# Patient Record
Sex: FEMALE | Race: BLACK OR AFRICAN AMERICAN | NOT HISPANIC OR LATINO | Employment: OTHER | URBAN - METROPOLITAN AREA
[De-identification: names, ages, dates, MRNs, and addresses within clinical notes are randomized per-mention and may not be internally consistent; named-entity substitution may affect disease eponyms.]

---

## 2017-07-07 ENCOUNTER — HOSPITAL ENCOUNTER (EMERGENCY)
Facility: HOSPITAL | Age: 82
Discharge: HOME | End: 2017-07-07
Attending: SURGERY
Payer: MEDICARE

## 2017-07-07 VITALS
RESPIRATION RATE: 16 BRPM | TEMPERATURE: 98 F | HEART RATE: 60 BPM | DIASTOLIC BLOOD PRESSURE: 75 MMHG | SYSTOLIC BLOOD PRESSURE: 152 MMHG | OXYGEN SATURATION: 96 % | BODY MASS INDEX: 40.36 KG/M2 | WEIGHT: 180 LBS
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
## 2017-07-07 DIAGNOSIS — M25.552 LEFT HIP PAIN: Primary | ICD-10-CM

## 2017-07-07 PROCEDURE — 99283 EMERGENCY DEPT VISIT LOW MDM: CPT

## 2017-07-07 PROCEDURE — 25000003 PHARM REV CODE 250: Performed by: SURGERY

## 2017-07-07 RX ORDER — IBUPROFEN 800 MG/1
800 TABLET ORAL EVERY 6 HOURS PRN
Qty: 20 TABLET | Refills: 0 | Status: SHIPPED | OUTPATIENT
Start: 2017-07-07 | End: 2018-06-15

## 2017-07-07 RX ORDER — ACETAMINOPHEN 500 MG
1000 TABLET ORAL
Status: COMPLETED | OUTPATIENT
Start: 2017-07-07 | End: 2017-07-07

## 2017-07-07 RX ADMIN — ACETAMINOPHEN 1000 MG: 500 TABLET ORAL at 06:07

## 2017-07-08 NOTE — ED PROVIDER NOTES
[facility]                                        July 7, 2017                   Chief Complaint  89 y.o. female with Hip Pain (lt hip x 1 day)    History of Present Illness  Roseanne Marroquin presents to the emergency room with left hip pain this week  Patient's son states that the patient fell on July 4 with left hip pain afterwards  Patient on exam has a normal left hip no bruising swelling or deformity now  Patient has no leg shortening, is able to weight-bear without difficulty the ER  Patient denies any head trauma loss of consciousness, just left hip pain today    The history is provided by the patient  Medical history: Diabetes and hypertension  Surgical history: Cholecystectomy and hernia repair  No Known Allergies   No family history on file.    Review of Systems and Physical Exam     Review of Systems  -- Constitution - no fever, denies fatigue, no weakness, no chills  -- Eyes - no tearing or redness, no visual disturbance  -- Ear, Nose - no tinnitus or earache, no nasal congestion or discharge  -- Mouth,Throat - no sore throat, no toothache, normal voice, normal swallowing  -- Respiratory - denies cough and congestion, no shortness of breath, no GAVIN  -- Cardiovascular - denies chest pain, no palpitations, denies claudication  -- Gastrointestinal - denies abdominal pain, nausea, vomiting, or diarrhea  -- Musculoskeletal - left hip pain  -- Neurological - no headache, denies weakness or seizure; no LOC  -- Skin - denies pallor, rash, or changes in skin. no hives or welts noted    Vital Signs  -- Her blood pressure is 152/75 and her pulse is 60.  -- Her respiration is 16 and oxygen saturation is 96%.      Physical Exam  -- Nursing note and vitals reviewed  -- Constitutional: Appears well-developed and well-nourished  -- Head: Atraumatic. Normocephalic. No obvious abnormality  -- Eyes: Pupils are equal and reactive to light. Normal conjunctiva and lids  -- Cardiac: Normal rate, regular  rhythm and normal heart sounds  -- Pulmonary: Normal respiratory effort, breath sounds clear to auscultation  -- Abdominal: Soft, no tenderness. Normal bowel sounds. Normal liver edge  -- Musculoskeletal: Normal range of motion, no effusions. Joints stable   -- Neurological: No focal deficits. Showed good interaction with staff  -- Vascular: Posterior tibial, dorsalis pedis and radial pulses 2+ bilaterally      Emergency Room Course     Treatment and Evaluation  -- Chest x-ray showed no infiltrate and showed no acute pathology   -- Left hip x-ray shows a negative for fracture  -- PO 1 g Tylenol given in today in the ER    Diagnosis  -- The primary encounter diagnosis was Left hip pain.   -- A diagnosis of Fall was also pertinent to this visit.    Disposition and Plan  -- Disposition: home  -- Condition: stable  -- Follow-up: Patient to follow up with JHONATHAN Andrea MD in 1-2 days.  -- I advised the patient that we have found no life threatening condition today  -- At this time, I believe the patient is clinically stable for discharge.   -- The patient acknowledges that close follow up with a MD is required   -- Patient agrees to comply with all instruction and direction given in the ER    This note is dictated on Dragon Natural Speaking word recognition program.  There are word recognition mistakes that are occasionally missed on review.           Fernando Baker MD  07/07/17 5083

## 2018-01-21 ENCOUNTER — HOSPITAL ENCOUNTER (EMERGENCY)
Facility: HOSPITAL | Age: 83
Discharge: HOME | End: 2018-01-21
Attending: EMERGENCY MEDICINE
Payer: MEDICARE

## 2018-01-21 VITALS
SYSTOLIC BLOOD PRESSURE: 172 MMHG | BODY MASS INDEX: 38.56 KG/M2 | HEART RATE: 57 BPM | TEMPERATURE: 97 F | RESPIRATION RATE: 17 BRPM | WEIGHT: 172 LBS | DIASTOLIC BLOOD PRESSURE: 74 MMHG

## 2018-01-21 DIAGNOSIS — S39.012A ACUTE MYOFASCIAL STRAIN OF LUMBAR REGION, INITIAL ENCOUNTER: Primary | ICD-10-CM

## 2018-01-21 LAB
AMORPH CRY URNS QL MICRO: ABNORMAL
BACTERIA #/AREA URNS HPF: ABNORMAL /HPF
BILIRUB UR QL STRIP: NEGATIVE
CLARITY UR: CLEAR
COLOR UR: YELLOW
GLUCOSE UR QL STRIP: NEGATIVE
HGB UR QL STRIP: ABNORMAL
KETONES UR QL STRIP: NEGATIVE
LEUKOCYTE ESTERASE UR QL STRIP: ABNORMAL
MICROSCOPIC COMMENT: ABNORMAL
NITRITE UR QL STRIP: NEGATIVE
PH UR STRIP: 5 [PH] (ref 5–8)
PROT UR QL STRIP: NEGATIVE
RBC #/AREA URNS HPF: 7 /HPF (ref 0–4)
SP GR UR STRIP: 1.02 (ref 1–1.03)
SQUAMOUS #/AREA URNS HPF: 6 /HPF
URN SPEC COLLECT METH UR: ABNORMAL
UROBILINOGEN UR STRIP-ACNC: NEGATIVE EU/DL
WBC #/AREA URNS HPF: 6 /HPF (ref 0–5)

## 2018-01-21 PROCEDURE — 99283 EMERGENCY DEPT VISIT LOW MDM: CPT | Mod: 25

## 2018-01-21 PROCEDURE — 25000003 PHARM REV CODE 250: Performed by: EMERGENCY MEDICINE

## 2018-01-21 PROCEDURE — 96372 THER/PROPH/DIAG INJ SC/IM: CPT

## 2018-01-21 PROCEDURE — 81000 URINALYSIS NONAUTO W/SCOPE: CPT

## 2018-01-21 PROCEDURE — 63600175 PHARM REV CODE 636 W HCPCS: Performed by: EMERGENCY MEDICINE

## 2018-01-21 RX ORDER — KETOROLAC TROMETHAMINE 10 MG/1
10 TABLET, FILM COATED ORAL EVERY 6 HOURS
Qty: 15 TABLET | Refills: 0 | Status: ON HOLD | OUTPATIENT
Start: 2018-01-21 | End: 2018-06-16

## 2018-01-21 RX ORDER — KETOROLAC TROMETHAMINE 30 MG/ML
30 INJECTION, SOLUTION INTRAMUSCULAR; INTRAVENOUS
Status: COMPLETED | OUTPATIENT
Start: 2018-01-21 | End: 2018-01-21

## 2018-01-21 RX ORDER — CYCLOBENZAPRINE HCL 10 MG
10 TABLET ORAL 3 TIMES DAILY PRN
Qty: 15 TABLET | Refills: 0 | Status: SHIPPED | OUTPATIENT
Start: 2018-01-21 | End: 2018-01-26

## 2018-01-21 RX ORDER — CYCLOBENZAPRINE HCL 10 MG
10 TABLET ORAL
Status: COMPLETED | OUTPATIENT
Start: 2018-01-21 | End: 2018-01-21

## 2018-01-21 RX ADMIN — KETOROLAC TROMETHAMINE 30 MG: 30 INJECTION, SOLUTION INTRAMUSCULAR at 02:01

## 2018-01-21 RX ADMIN — CYCLOBENZAPRINE HYDROCHLORIDE 10 MG: 10 TABLET, FILM COATED ORAL at 02:01

## 2018-01-21 NOTE — ED PROVIDER NOTES
Encounter Date: 1/21/2018       History     Chief Complaint   Patient presents with    Back Pain     This 89-year-old female complains of a pain in her lumbar area lateral to the left above her pelvis.  It is nonradiating.  it hurts to move.  Been there for several days.  She recalls no trauma.  She's had a compression fracture of T12 in the not too distant past.  She denies any urinary complaints.  Her bowels are normal.  She has some epigastric discomfort sometimes when eating          Review of patient's allergies indicates:  No Known Allergies  Past Medical History:   Diagnosis Date    Diabetes mellitus     Hypertension      Past Surgical History:   Procedure Laterality Date    CHOLECYSTECTOMY      HERNIA REPAIR       No family history on file.  Social History   Substance Use Topics    Smoking status: Never Smoker    Smokeless tobacco: Not on file    Alcohol use No     Review of Systems   Musculoskeletal: Positive for back pain.   All other systems reviewed and are negative.      Physical Exam     Initial Vitals [01/21/18 1157]   BP Pulse Resp Temp SpO2   (!) 172/74 (!) 57 17 97.4 °F (36.3 °C) --      MAP       106.67         Physical Exam    Nursing note and vitals reviewed.  Constitutional: She appears well-developed and well-nourished.   HENT:   Mouth/Throat: Oropharynx is clear and moist.   Eyes: Conjunctivae are normal. Pupils are equal, round, and reactive to light.   Neck: Normal range of motion.   Cardiovascular: Normal heart sounds and intact distal pulses.   Pulmonary/Chest: Breath sounds normal. No respiratory distress.   Abdominal: Soft. Bowel sounds are normal.   Musculoskeletal: Normal range of motion.   Examination of back shows some muscle spasm in the lateral lumbar area with tenderness.   Skin: Skin is warm and dry.   Psychiatric: She has a normal mood and affect.         ED Course   Procedures  Labs Reviewed   URINALYSIS                               ED Course      Clinical Impression:    The encounter diagnosis was Acute myofascial strain of lumbar region, initial encounter.                           Sergey Collins MD  01/21/18 9878

## 2018-06-15 ENCOUNTER — HOSPITAL ENCOUNTER (OUTPATIENT)
Facility: HOSPITAL | Age: 83
End: 2018-06-16
Attending: FAMILY MEDICINE | Admitting: FAMILY MEDICINE
Payer: MEDICARE

## 2018-06-15 DIAGNOSIS — N30.01 ACUTE CYSTITIS WITH HEMATURIA: ICD-10-CM

## 2018-06-15 DIAGNOSIS — R55 SYNCOPE: ICD-10-CM

## 2018-06-15 DIAGNOSIS — T67.1XXA HEAT SYNCOPE, INITIAL ENCOUNTER: Primary | ICD-10-CM

## 2018-06-15 LAB
ALBUMIN SERPL BCP-MCNC: 3.9 G/DL
ALP SERPL-CCNC: 67 U/L
ALT SERPL W/O P-5'-P-CCNC: 13 U/L
ANION GAP SERPL CALC-SCNC: 10 MMOL/L
AST SERPL-CCNC: 18 U/L
BACTERIA #/AREA URNS HPF: ABNORMAL /HPF
BASOPHILS # BLD AUTO: 0.03 K/UL
BASOPHILS NFR BLD: 0.5 %
BILIRUB SERPL-MCNC: 0.8 MG/DL
BILIRUB UR QL STRIP: NEGATIVE
BUN SERPL-MCNC: 24 MG/DL
CALCIUM SERPL-MCNC: 9.5 MG/DL
CHLORIDE SERPL-SCNC: 104 MMOL/L
CLARITY UR: CLEAR
CO2 SERPL-SCNC: 27 MMOL/L
COLOR UR: YELLOW
CREAT SERPL-MCNC: 1.4 MG/DL
DIFFERENTIAL METHOD: ABNORMAL
EOSINOPHIL # BLD AUTO: 1.1 K/UL
EOSINOPHIL NFR BLD: 16.9 %
ERYTHROCYTE [DISTWIDTH] IN BLOOD BY AUTOMATED COUNT: 16.2 %
EST. GFR  (AFRICAN AMERICAN): 38 ML/MIN/1.73 M^2
EST. GFR  (NON AFRICAN AMERICAN): 33 ML/MIN/1.73 M^2
GLUCOSE SERPL-MCNC: 99 MG/DL
GLUCOSE UR QL STRIP: NEGATIVE
HCT VFR BLD AUTO: 40.7 %
HGB BLD-MCNC: 13.2 G/DL
HGB UR QL STRIP: ABNORMAL
HYALINE CASTS #/AREA URNS LPF: 3 /LPF
KETONES UR QL STRIP: NEGATIVE
LEUKOCYTE ESTERASE UR QL STRIP: ABNORMAL
LYMPHOCYTES # BLD AUTO: 3.1 K/UL
LYMPHOCYTES NFR BLD: 46.5 %
MCH RBC QN AUTO: 27.8 PG
MCHC RBC AUTO-ENTMCNC: 32.4 G/DL
MCV RBC AUTO: 86 FL
MICROSCOPIC COMMENT: ABNORMAL
MONOCYTES # BLD AUTO: 0.5 K/UL
MONOCYTES NFR BLD: 7.3 %
NEUTROPHILS # BLD AUTO: 1.9 K/UL
NEUTROPHILS NFR BLD: 28.8 %
NITRITE UR QL STRIP: NEGATIVE
PH UR STRIP: 6 [PH] (ref 5–8)
PLATELET # BLD AUTO: 197 K/UL
PMV BLD AUTO: 10.5 FL
POTASSIUM SERPL-SCNC: 4.5 MMOL/L
PROT SERPL-MCNC: 7.8 G/DL
PROT UR QL STRIP: NEGATIVE
RBC # BLD AUTO: 4.75 M/UL
RBC #/AREA URNS HPF: 12 /HPF (ref 0–4)
SODIUM SERPL-SCNC: 141 MMOL/L
SP GR UR STRIP: <=1.005 (ref 1–1.03)
SQUAMOUS #/AREA URNS HPF: 1 /HPF
TROPONIN I SERPL DL<=0.01 NG/ML-MCNC: 0.01 NG/ML
TSH SERPL DL<=0.005 MIU/L-ACNC: 0.8 UIU/ML
URN SPEC COLLECT METH UR: ABNORMAL
UROBILINOGEN UR STRIP-ACNC: NEGATIVE EU/DL
WBC # BLD AUTO: 6.56 K/UL
WBC #/AREA URNS HPF: 11 /HPF (ref 0–5)
WBC CLUMPS URNS QL MICRO: ABNORMAL

## 2018-06-15 PROCEDURE — 63600175 PHARM REV CODE 636 W HCPCS: Performed by: INTERNAL MEDICINE

## 2018-06-15 PROCEDURE — 96375 TX/PRO/DX INJ NEW DRUG ADDON: CPT

## 2018-06-15 PROCEDURE — 25000003 PHARM REV CODE 250: Performed by: INTERNAL MEDICINE

## 2018-06-15 PROCEDURE — 84484 ASSAY OF TROPONIN QUANT: CPT

## 2018-06-15 PROCEDURE — 36415 COLL VENOUS BLD VENIPUNCTURE: CPT

## 2018-06-15 PROCEDURE — 99285 EMERGENCY DEPT VISIT HI MDM: CPT | Mod: 25

## 2018-06-15 PROCEDURE — G0378 HOSPITAL OBSERVATION PER HR: HCPCS

## 2018-06-15 PROCEDURE — 96361 HYDRATE IV INFUSION ADD-ON: CPT

## 2018-06-15 PROCEDURE — 94760 N-INVAS EAR/PLS OXIMETRY 1: CPT

## 2018-06-15 PROCEDURE — 84443 ASSAY THYROID STIM HORMONE: CPT

## 2018-06-15 PROCEDURE — 85025 COMPLETE CBC W/AUTO DIFF WBC: CPT

## 2018-06-15 PROCEDURE — 96374 THER/PROPH/DIAG INJ IV PUSH: CPT

## 2018-06-15 PROCEDURE — 93005 ELECTROCARDIOGRAM TRACING: CPT

## 2018-06-15 PROCEDURE — 87040 BLOOD CULTURE FOR BACTERIA: CPT | Mod: 59

## 2018-06-15 PROCEDURE — 81000 URINALYSIS NONAUTO W/SCOPE: CPT

## 2018-06-15 PROCEDURE — 80053 COMPREHEN METABOLIC PANEL: CPT

## 2018-06-15 RX ORDER — ASPIRIN 325 MG
325 TABLET ORAL
Status: COMPLETED | OUTPATIENT
Start: 2018-06-15 | End: 2018-06-15

## 2018-06-15 RX ORDER — LISINOPRIL 40 MG/1
40 TABLET ORAL DAILY
Status: DISCONTINUED | OUTPATIENT
Start: 2018-06-16 | End: 2018-06-16 | Stop reason: HOSPADM

## 2018-06-15 RX ORDER — CEFTRIAXONE 1 G/1
1 INJECTION, POWDER, FOR SOLUTION INTRAMUSCULAR; INTRAVENOUS
Status: COMPLETED | OUTPATIENT
Start: 2018-06-15 | End: 2018-06-15

## 2018-06-15 RX ORDER — HYDROCHLOROTHIAZIDE 25 MG/1
25 TABLET ORAL DAILY
Status: DISCONTINUED | OUTPATIENT
Start: 2018-06-16 | End: 2018-06-16 | Stop reason: HOSPADM

## 2018-06-15 RX ORDER — AMLODIPINE BESYLATE 5 MG/1
10 TABLET ORAL DAILY
Status: DISCONTINUED | OUTPATIENT
Start: 2018-06-16 | End: 2018-06-16 | Stop reason: HOSPADM

## 2018-06-15 RX ORDER — MECLIZINE HCL 12.5 MG 12.5 MG/1
12.5 TABLET ORAL 3 TIMES DAILY PRN
Status: DISCONTINUED | OUTPATIENT
Start: 2018-06-15 | End: 2018-06-16 | Stop reason: HOSPADM

## 2018-06-15 RX ORDER — KETOROLAC TROMETHAMINE 10 MG/1
10 TABLET, FILM COATED ORAL EVERY 6 HOURS
Status: DISCONTINUED | OUTPATIENT
Start: 2018-06-16 | End: 2018-06-16

## 2018-06-15 RX ORDER — CIPROFLOXACIN 2 MG/ML
400 INJECTION, SOLUTION INTRAVENOUS
Status: DISCONTINUED | OUTPATIENT
Start: 2018-06-15 | End: 2018-06-16

## 2018-06-15 RX ORDER — SODIUM CHLORIDE 9 MG/ML
500 INJECTION, SOLUTION INTRAVENOUS ONCE
Status: COMPLETED | OUTPATIENT
Start: 2018-06-15 | End: 2018-06-15

## 2018-06-15 RX ORDER — HYDROXYZINE HYDROCHLORIDE 25 MG/1
25 TABLET, FILM COATED ORAL 3 TIMES DAILY PRN
Status: DISCONTINUED | OUTPATIENT
Start: 2018-06-15 | End: 2018-06-16 | Stop reason: HOSPADM

## 2018-06-15 RX ADMIN — APIXABAN 2.5 MG: 2.5 TABLET, FILM COATED ORAL at 11:06

## 2018-06-15 RX ADMIN — KETOROLAC TROMETHAMINE 10 MG: 10 TABLET, FILM COATED ORAL at 11:06

## 2018-06-15 RX ADMIN — CIPROFLOXACIN 400 MG: 2 INJECTION, SOLUTION INTRAVENOUS at 11:06

## 2018-06-15 RX ADMIN — ASPIRIN 325 MG: 325 TABLET, COATED ORAL at 10:06

## 2018-06-15 RX ADMIN — CEFTRIAXONE SODIUM 1 G: 1 INJECTION, POWDER, FOR SOLUTION INTRAMUSCULAR; INTRAVENOUS at 09:06

## 2018-06-15 RX ADMIN — SODIUM CHLORIDE 500 ML: 0.9 INJECTION, SOLUTION INTRAVENOUS at 08:06

## 2018-06-16 ENCOUNTER — HOSPITAL ENCOUNTER (INPATIENT)
Facility: HOSPITAL | Age: 83
LOS: 3 days | Discharge: HOME | DRG: 309 | End: 2018-06-19
Attending: HOSPITALIST | Admitting: INTERNAL MEDICINE
Payer: MEDICARE

## 2018-06-16 VITALS
OXYGEN SATURATION: 98 % | HEART RATE: 54 BPM | BODY MASS INDEX: 38.78 KG/M2 | RESPIRATION RATE: 14 BRPM | DIASTOLIC BLOOD PRESSURE: 65 MMHG | WEIGHT: 172.38 LBS | SYSTOLIC BLOOD PRESSURE: 114 MMHG | HEIGHT: 56 IN | TEMPERATURE: 98 F

## 2018-06-16 DIAGNOSIS — I48.91 ATRIAL FIBRILLATION: ICD-10-CM

## 2018-06-16 DIAGNOSIS — I49.9 ABNORMAL HEART RHYTHM: ICD-10-CM

## 2018-06-16 DIAGNOSIS — R55 VASOVAGAL SYNCOPE: Primary | ICD-10-CM

## 2018-06-16 DIAGNOSIS — I49.5 SSS (SICK SINUS SYNDROME): ICD-10-CM

## 2018-06-16 DIAGNOSIS — R00.1 SYMPTOMATIC BRADYCARDIA: ICD-10-CM

## 2018-06-16 LAB
ANION GAP SERPL CALC-SCNC: 10 MMOL/L
APTT BLDCRRT: 24.6 SEC
BUN SERPL-MCNC: 20 MG/DL
CALCIUM SERPL-MCNC: 9.4 MG/DL
CHLORIDE SERPL-SCNC: 103 MMOL/L
CO2 SERPL-SCNC: 27 MMOL/L
CREAT SERPL-MCNC: 1.4 MG/DL
EST. GFR  (AFRICAN AMERICAN): 37.6 ML/MIN/1.73 M^2
EST. GFR  (NON AFRICAN AMERICAN): 32.6 ML/MIN/1.73 M^2
GLUCOSE SERPL-MCNC: 119 MG/DL
INR PPP: 1
MAGNESIUM SERPL-MCNC: 2.4 MG/DL
POTASSIUM SERPL-SCNC: 4.1 MMOL/L
PROTHROMBIN TIME: 10.8 SEC
SODIUM SERPL-SCNC: 140 MMOL/L

## 2018-06-16 PROCEDURE — 63600175 PHARM REV CODE 636 W HCPCS

## 2018-06-16 PROCEDURE — 25000003 PHARM REV CODE 250: Performed by: INTERNAL MEDICINE

## 2018-06-16 PROCEDURE — G0378 HOSPITAL OBSERVATION PER HR: HCPCS

## 2018-06-16 PROCEDURE — 94761 N-INVAS EAR/PLS OXIMETRY MLT: CPT

## 2018-06-16 PROCEDURE — 85610 PROTHROMBIN TIME: CPT

## 2018-06-16 PROCEDURE — 83735 ASSAY OF MAGNESIUM: CPT

## 2018-06-16 PROCEDURE — 99223 1ST HOSP IP/OBS HIGH 75: CPT | Mod: AI,,, | Performed by: INTERNAL MEDICINE

## 2018-06-16 PROCEDURE — 20600001 HC STEP DOWN PRIVATE ROOM

## 2018-06-16 PROCEDURE — 99220 PR INITIAL OBSERVATION CARE,LEVL III: CPT | Mod: ,,, | Performed by: FAMILY MEDICINE

## 2018-06-16 PROCEDURE — 36415 COLL VENOUS BLD VENIPUNCTURE: CPT

## 2018-06-16 PROCEDURE — 93010 ELECTROCARDIOGRAM REPORT: CPT | Mod: ,,, | Performed by: INTERNAL MEDICINE

## 2018-06-16 PROCEDURE — 25000003 PHARM REV CODE 250: Performed by: FAMILY MEDICINE

## 2018-06-16 PROCEDURE — 85730 THROMBOPLASTIN TIME PARTIAL: CPT

## 2018-06-16 PROCEDURE — 93005 ELECTROCARDIOGRAM TRACING: CPT

## 2018-06-16 PROCEDURE — 80048 BASIC METABOLIC PNL TOTAL CA: CPT

## 2018-06-16 PROCEDURE — 87086 URINE CULTURE/COLONY COUNT: CPT

## 2018-06-16 PROCEDURE — 96375 TX/PRO/DX INJ NEW DRUG ADDON: CPT

## 2018-06-16 RX ORDER — HYDRALAZINE HYDROCHLORIDE 25 MG/1
25 TABLET, FILM COATED ORAL EVERY 4 HOURS PRN
Status: DISCONTINUED | OUTPATIENT
Start: 2018-06-16 | End: 2018-06-19 | Stop reason: HOSPADM

## 2018-06-16 RX ORDER — IBUPROFEN 200 MG
16 TABLET ORAL
Status: DISCONTINUED | OUTPATIENT
Start: 2018-06-16 | End: 2018-06-19 | Stop reason: HOSPADM

## 2018-06-16 RX ORDER — ONDANSETRON 8 MG/1
8 TABLET, ORALLY DISINTEGRATING ORAL EVERY 8 HOURS PRN
Status: DISCONTINUED | OUTPATIENT
Start: 2018-06-16 | End: 2018-06-16

## 2018-06-16 RX ORDER — ATROPINE SULFATE 1 MG/ML
0.5 INJECTION, SOLUTION INTRAMUSCULAR; INTRAVENOUS; SUBCUTANEOUS ONCE
Status: DISCONTINUED | OUTPATIENT
Start: 2018-06-16 | End: 2018-06-16 | Stop reason: HOSPADM

## 2018-06-16 RX ORDER — HYDRALAZINE HYDROCHLORIDE 20 MG/ML
5 INJECTION INTRAMUSCULAR; INTRAVENOUS EVERY 6 HOURS PRN
Status: DISCONTINUED | OUTPATIENT
Start: 2018-06-16 | End: 2018-06-19 | Stop reason: HOSPADM

## 2018-06-16 RX ORDER — ONDANSETRON 2 MG/ML
INJECTION INTRAMUSCULAR; INTRAVENOUS
Status: COMPLETED
Start: 2018-06-16 | End: 2018-06-16

## 2018-06-16 RX ORDER — HYDROXYZINE HYDROCHLORIDE 25 MG/1
25 TABLET, FILM COATED ORAL 3 TIMES DAILY PRN
Status: DISCONTINUED | OUTPATIENT
Start: 2018-06-16 | End: 2018-06-16

## 2018-06-16 RX ORDER — ONDANSETRON 2 MG/ML
4 INJECTION INTRAMUSCULAR; INTRAVENOUS EVERY 6 HOURS PRN
Status: DISCONTINUED | OUTPATIENT
Start: 2018-06-16 | End: 2018-06-16 | Stop reason: HOSPADM

## 2018-06-16 RX ORDER — AMLODIPINE BESYLATE 10 MG/1
10 TABLET ORAL DAILY
Status: DISCONTINUED | OUTPATIENT
Start: 2018-06-17 | End: 2018-06-19 | Stop reason: HOSPADM

## 2018-06-16 RX ORDER — IBUPROFEN 200 MG
24 TABLET ORAL
Status: DISCONTINUED | OUTPATIENT
Start: 2018-06-16 | End: 2018-06-19 | Stop reason: HOSPADM

## 2018-06-16 RX ORDER — GLUCAGON 1 MG
1 KIT INJECTION
Status: DISCONTINUED | OUTPATIENT
Start: 2018-06-16 | End: 2018-06-19 | Stop reason: HOSPADM

## 2018-06-16 RX ORDER — SODIUM CHLORIDE 0.9 % (FLUSH) 0.9 %
5 SYRINGE (ML) INJECTION
Status: DISCONTINUED | OUTPATIENT
Start: 2018-06-16 | End: 2018-06-19 | Stop reason: HOSPADM

## 2018-06-16 RX ORDER — PANTOPRAZOLE SODIUM 40 MG/1
40 TABLET, DELAYED RELEASE ORAL DAILY
Status: DISCONTINUED | OUTPATIENT
Start: 2018-06-17 | End: 2018-06-19 | Stop reason: HOSPADM

## 2018-06-16 RX ORDER — PANTOPRAZOLE SODIUM 40 MG/1
40 TABLET, DELAYED RELEASE ORAL DAILY
Status: DISCONTINUED | OUTPATIENT
Start: 2018-06-16 | End: 2018-06-16 | Stop reason: HOSPADM

## 2018-06-16 RX ORDER — HEPARIN SODIUM 5000 [USP'U]/ML
5000 INJECTION, SOLUTION INTRAVENOUS; SUBCUTANEOUS EVERY 8 HOURS
Status: DISCONTINUED | OUTPATIENT
Start: 2018-06-16 | End: 2018-06-16

## 2018-06-16 RX ORDER — HYDRALAZINE HYDROCHLORIDE 25 MG/1
25 TABLET, FILM COATED ORAL EVERY 8 HOURS
Status: DISCONTINUED | OUTPATIENT
Start: 2018-06-16 | End: 2018-06-19 | Stop reason: HOSPADM

## 2018-06-16 RX ADMIN — APIXABAN 2.5 MG: 2.5 TABLET, FILM COATED ORAL at 09:06

## 2018-06-16 RX ADMIN — ONDANSETRON 4 MG: 2 INJECTION INTRAMUSCULAR; INTRAVENOUS at 09:06

## 2018-06-16 RX ADMIN — PANTOPRAZOLE SODIUM 40 MG: 40 TABLET, DELAYED RELEASE ORAL at 03:06

## 2018-06-16 RX ADMIN — LIDOCAINE HYDROCHLORIDE 50 ML: 20 SOLUTION ORAL; TOPICAL at 03:06

## 2018-06-16 RX ADMIN — HYDROCHLOROTHIAZIDE 25 MG: 25 TABLET ORAL at 09:06

## 2018-06-16 RX ADMIN — AMLODIPINE BESYLATE 10 MG: 5 TABLET ORAL at 09:06

## 2018-06-16 RX ADMIN — APIXABAN 2.5 MG: 2.5 TABLET, FILM COATED ORAL at 10:06

## 2018-06-16 RX ADMIN — ALUMINUM HYDROXIDE, MAGNESIUM HYDROXIDE, AND SIMETHICONE 50 ML: 200; 200; 20 SUSPENSION ORAL at 10:06

## 2018-06-16 RX ADMIN — LISINOPRIL 40 MG: 40 TABLET ORAL at 09:06

## 2018-06-16 RX ADMIN — HYDRALAZINE HYDROCHLORIDE 25 MG: 25 TABLET, FILM COATED ORAL at 10:06

## 2018-06-16 NOTE — SUBJECTIVE & OBJECTIVE
Past Medical History:   Diagnosis Date    Diabetes mellitus     Hypertension        Past Surgical History:   Procedure Laterality Date    CHOLECYSTECTOMY      HERNIA REPAIR         Review of patient's allergies indicates:  No Known Allergies    No current facility-administered medications on file prior to encounter.      Current Outpatient Prescriptions on File Prior to Encounter   Medication Sig    amlodipine (NORVASC) 10 MG tablet Take 1 tablet (10 mg total) by mouth once daily.    hydrochlorothiazide (HYDRODIURIL) 25 MG tablet     hydrOXYzine (ATARAX) 25 MG tablet Take 25 mg by mouth 3 (three) times daily as needed for Itching.    ketorolac (TORADOL) 10 mg tablet Take 1 tablet (10 mg total) by mouth every 6 (six) hours.    lisinopril (PRINIVIL,ZESTRIL) 40 MG tablet Take 40 mg by mouth once daily.    meclizine (ANTIVERT) 12.5 mg tablet Take 1 tablet (12.5 mg total) by mouth 3 (three) times daily as needed for Dizziness.    metformin (GLUCOPHAGE) 500 MG tablet      Family History     None        Social History Main Topics    Smoking status: Never Smoker    Smokeless tobacco: Never Used    Alcohol use No    Drug use: No    Sexual activity: No     Review of Systems   Constitutional: Negative.  Negative for activity change, appetite change, chills, diaphoresis, fatigue, fever and unexpected weight change.   HENT: Negative.  Negative for congestion, dental problem, drooling, ear discharge, ear pain, facial swelling, hearing loss, mouth sores, nosebleeds, postnasal drip, rhinorrhea, sinus pressure, sneezing, sore throat, tinnitus, trouble swallowing and voice change.    Eyes: Negative.  Negative for photophobia, pain, discharge, redness, itching and visual disturbance.   Respiratory: Positive for shortness of breath. Negative for apnea, cough, choking, chest tightness and wheezing.    Cardiovascular: Negative.  Negative for chest pain, palpitations and leg swelling.   Gastrointestinal: Positive for  nausea. Negative for abdominal distention, abdominal pain, anal bleeding, blood in stool, constipation, diarrhea, rectal pain and vomiting.        GERD   Genitourinary: Negative.  Negative for difficulty urinating, dyspareunia, dysuria, enuresis, flank pain, frequency, hematuria, menstrual problem, pelvic pain, urgency, vaginal bleeding, vaginal discharge and vaginal pain.   Musculoskeletal: Negative.  Negative for arthralgias, back pain, gait problem, joint swelling, myalgias, neck pain and neck stiffness.   Skin: Negative.  Negative for color change, pallor, rash and wound.   Neurological: Positive for dizziness and syncope. Negative for tremors, seizures, facial asymmetry, speech difficulty, weakness, light-headedness, numbness and headaches.   Hematological: Negative for adenopathy. Does not bruise/bleed easily.   Psychiatric/Behavioral: Negative.  Negative for agitation, behavioral problems, confusion, decreased concentration, dysphoric mood, hallucinations, self-injury, sleep disturbance and suicidal ideas. The patient is not nervous/anxious and is not hyperactive.      Objective:     Vital Signs (Most Recent):  Temp: 97 °F (36.1 °C) (06/16/18 0415)  Pulse: (!) 56 (06/16/18 0600)  Resp: (!) 23 (06/16/18 0600)  BP: (!) 166/86 (06/16/18 0600)  SpO2: 98 % (06/16/18 0600) Vital Signs (24h Range):  Temp:  [96.8 °F (36 °C)-97.2 °F (36.2 °C)] 97 °F (36.1 °C)  Pulse:  [51-72] 56  Resp:  [15-23] 23  SpO2:  [93 %-98 %] 98 %  BP: (130-202)/(61-97) 166/86     Weight: 78.2 kg (172 lb 6.4 oz)  Body mass index is 38.65 kg/m².    Physical Exam   Constitutional: She is oriented to person, place, and time. She appears well-developed and well-nourished.   HENT:   Head: Normocephalic and atraumatic.   Right Ear: External ear normal.   Left Ear: External ear normal.   Nose: Nose normal.   Mouth/Throat: Oropharynx is clear and moist.   Eyes: EOM are normal. Pupils are equal, round, and reactive to light.   Neck: Normal range of  motion. Neck supple. No JVD present. No tracheal deviation present. No thyromegaly present.   Cardiovascular: Normal rate, normal heart sounds and intact distal pulses.    No murmur heard.  Pulmonary/Chest: Effort normal and breath sounds normal. No respiratory distress. She has no wheezes. She has no rales. She exhibits no tenderness.   Abdominal: Soft. Bowel sounds are normal. She exhibits no distension and no mass. There is no tenderness. There is no rebound and no guarding.   Musculoskeletal: Normal range of motion. She exhibits no edema or tenderness.   Lymphadenopathy:     She has no cervical adenopathy.   Neurological: She is alert and oriented to person, place, and time. She has normal reflexes. She displays normal reflexes. No cranial nerve deficit. She exhibits normal muscle tone. Coordination normal.   Skin: Skin is warm and dry. No rash noted. No erythema. No pallor.   Psychiatric: She has a normal mood and affect. Her behavior is normal. Judgment and thought content normal.         CRANIAL NERVES     CN III, IV, VI   Pupils are equal, round, and reactive to light.  Extraocular motions are normal.        Significant Labs:   CBC:   Recent Labs  Lab 06/15/18  1954   WBC 6.56   HGB 13.2   HCT 40.7        CMP:   Recent Labs  Lab 06/15/18  1954      K 4.5      CO2 27   GLU 99   BUN 24   CREATININE 1.4   CALCIUM 9.5   PROT 7.8   ALBUMIN 3.9   BILITOT 0.8   ALKPHOS 67   AST 18   ALT 13   ANIONGAP 10   EGFRNONAA 33*     Magnesium:   Recent Labs  Lab 06/16/18  0150   MG 2.4     TSH:   Recent Labs  Lab 06/15/18  1954   TSH 0.797     Urine Culture: No results for input(s): LABURIN in the last 48 hours.  Urine Studies:   Recent Labs  Lab 06/15/18  2030   COLORU Yellow   APPEARANCEUA Clear   PHUR 6.0   SPECGRAV <=1.005*   PROTEINUA Negative   GLUCUA Negative   KETONESU Negative   BILIRUBINUA Negative   OCCULTUA 2+*   NITRITE Negative   UROBILINOGEN Negative   LEUKOCYTESUR 1+*   RBCUA 12*   WBCUA  11*   BACTERIA Few*   SQUAMEPITHEL 1   HYALINECASTS 3*       Significant Imaging: I have reviewed and interpreted all pertinent imaging results/findings within the past 24 hours.

## 2018-06-16 NOTE — NURSING
Notified Dr. Jiang by phone patient's heart rate is 45 and having more frequent pauses when she sleeps. No new orders will continue to monitor.

## 2018-06-16 NOTE — ED TRIAGE NOTES
89 y.o. female presents to ER ED 02/ED 02A   Chief Complaint   Patient presents with    Loss of Consciousness     Pt presents via [facility]. Syncopal episode at home after sitting on front porch in sun from 3978-2613. Pt AAOx4 at this time. CBG 90.   . No acute distress noted.

## 2018-06-16 NOTE — ED PROVIDER NOTES
Encounter Date: 6/15/2018       History     Chief Complaint   Patient presents with    Loss of Consciousness     Pt presents via [facility]. Syncopal episode at home after sitting on front porch in sun from 4152-2045. Pt AAOx4 at this time. CBG 90.     89-year-old female presents to the emergency department complaining of a syncopal episode earlier today.  She states she felt hot out on her porch while she was sitting, then passed out.  She denies fevers/chills, nausea/vomiting.  She also denies chest pain.      The history is provided by the patient. No  was used.   Neurologic Problem   The primary symptoms include syncope and loss of consciousness. Primary symptoms do not include headaches, seizures or dizziness. The symptoms began just prior to arrival. The episode lasted 1 minute. The symptoms are resolved. The neurological symptoms are diffuse.   The syncopal episode began 1 hour ago. There was loss of consciousness. The duration of the episode was 1 minute. Before the onset of the syncopal episode there was sweating. There was no dizziness or weakness. The episode was precipitated by a warm environment. The episode occurred at rest. The syncopal episode did not occur with shortness of breath, headaches or focal sensory loss. There was no post-event confusion. There is history of hypertension and diabetes. There is no history of seizures.   Loss of consciousness began less than 1 hour ago. The episode was associated with a warm environment.   Additional symptoms do not include neck stiffness, weakness or pain. Medical issues also include hypertension.     Review of patient's allergies indicates:  No Known Allergies  Past Medical History:   Diagnosis Date    Diabetes mellitus     Hypertension      Past Surgical History:   Procedure Laterality Date    CHOLECYSTECTOMY      HERNIA REPAIR       History reviewed. No pertinent family history.  Social History   Substance Use Topics    Smoking  status: Never Smoker    Smokeless tobacco: Never Used    Alcohol use No     Review of Systems   Respiratory: Negative for shortness of breath.    Cardiovascular: Positive for syncope.   Musculoskeletal: Negative for neck stiffness.   Neurological: Positive for loss of consciousness and syncope. Negative for dizziness, tremors, seizures, speech difficulty, weakness, light-headedness, numbness and headaches.   All other systems reviewed and are negative.      Physical Exam     Initial Vitals [06/15/18 1936]   BP Pulse Resp Temp SpO2   (!) 202/85 65 18 97.2 °F (36.2 °C) (!) 93 %      MAP       --         Physical Exam    Nursing note and vitals reviewed.  Constitutional: She appears well-developed and well-nourished. No distress.   HENT:   Head: Normocephalic and atraumatic.   Right Ear: External ear normal.   Left Ear: External ear normal.   Mouth/Throat: Oropharynx is clear and moist.   Eyes: Conjunctivae and EOM are normal.   Neck: Normal range of motion. Neck supple.   Cardiovascular: Normal rate, regular rhythm and normal heart sounds.   Pulmonary/Chest: Breath sounds normal. No respiratory distress. She has no wheezes. She has no rales.   Abdominal: Soft. Bowel sounds are normal.   Musculoskeletal: Normal range of motion.   Neurological: She is alert and oriented to person, place, and time. She has normal strength. No cranial nerve deficit or sensory deficit.   Skin: Skin is warm and dry.   Psychiatric: She has a normal mood and affect. Her behavior is normal. Thought content normal.         ED Course   Critical Care  Date/Time: 6/15/2018 10:53 PM  Performed by: ISAEL STEWARD  Authorized by: ANDRES NYE   Direct patient critical care time: 10 minutes  Ordering / reviewing critical care time: 10 minutes  Documentation critical care time: 15 minutes  Consulting other physicians critical care time: 5 minutes  Consult with family critical care time: 5 minutes  Total critical care time (exclusive of  procedural time) : 45 minutes  Critical care was necessary to treat or prevent imminent or life-threatening deterioration of the following conditions: circulatory failure and CNS failure or compromise.  Critical care was time spent personally by me on the following activities: ordering and review of laboratory studies, evaluation of patient's response to treatment, obtaining history from patient or surrogate, development of treatment plan with patient or surrogate, examination of patient, ordering and performing treatments and interventions and re-evaluation of patient's condition.  Subsequent provider of critical care: I assumed direction of critical care for this patient from another provider of my specialty.        Labs Reviewed   CBC W/ AUTO DIFFERENTIAL - Abnormal; Notable for the following:        Result Value    RDW 16.2 (*)     Eos # 1.1 (*)     Gran% 28.8 (*)     Eosinophil% 16.9 (*)     All other components within normal limits   COMPREHENSIVE METABOLIC PANEL - Abnormal; Notable for the following:     eGFR if  38 (*)     eGFR if non  33 (*)     All other components within normal limits   URINALYSIS - Abnormal; Notable for the following:     Specific Gravity, UA <=1.005 (*)     Occult Blood UA 2+ (*)     Leukocytes, UA 1+ (*)     All other components within normal limits   URINALYSIS MICROSCOPIC - Abnormal; Notable for the following:     RBC, UA 12 (*)     WBC, UA 11 (*)     Bacteria, UA Few (*)     Hyaline Casts, UA 3 (*)     All other components within normal limits   CULTURE, BLOOD   CULTURE, BLOOD   TROPONIN I          CT Head Without Contrast    (Results Pending)        Medical Decision Making:   Initial Assessment:   89-year-old female presents to the emergency department complaining of a syncopal episode earlier today.  She states she felt hot out on her porch while she was sitting, then passed out.  She denies fevers/chills, nausea/vomiting.  She also denies chest  pain.    Clinical Tests:   Lab Tests: Ordered and Reviewed  ED Management:  CBC, CMP were within normal limits grossly.  Urinalysis reveals signs of infection.  Urine culture was sent.  Patient was given Rocephin in the emergency department.  EKG revealed atrial fibrillation.  Patient states she has no history of arrhythmias and is not currently on anticoagulation.  Chads 2 score was 4 and patient was started Eliquis.  Dr. Jiang was called for admission secondary to new-onset atrial fibrillation, syncope and urinary tract infection.  He accepts the patient.  Patient was clinically stable upon transfer to floor for admission.                      Clinical Impression:   The primary encounter diagnosis was Heat syncope, initial encounter. Diagnoses of Acute cystitis with hematuria and Syncope were also pertinent to this visit.      Disposition:   Disposition: Admitted  Condition: Stable                        Antonino Waldrop MD  06/15/18 4917

## 2018-06-16 NOTE — ASSESSMENT & PLAN NOTE
Dropping into low 30s when asleep and has had syncopal episode. Needs a pacemaker. Will transfer to higher level of care

## 2018-06-16 NOTE — PROGRESS NOTES
Report per Mita RN; patient to [facility] via stretcher with RN at side. Patient currently AAOx3 w/o distress, HR now NSR 60's with intermittent junctional rhythm noted. Vitals stable per flow sheet. New orders noted, will monitor closely.

## 2018-06-16 NOTE — ASSESSMENT & PLAN NOTE
I suspect her syncope was from bradycardia   Will work on transfer for cardiology eval and probable pacemaker

## 2018-06-16 NOTE — ED NOTES
Pt provided a urine speciman cup, lyla soap towelette wipe, and instructions for [facility]; pt verbalizes understanding.  Will continue to monitor.

## 2018-06-16 NOTE — HPI
89 year old female presents to ED with h/o syncopal episode on her way to [facility] study yesterday evening.  Work up in ED reveals new onset Afib with NVR and UTI.  ROBERT score of 2. Started eliquis and placed on floor for telemetry monitoring and cipro started for UTI. She dropped into the low 30s overnight with several long pauses. Atropine was given once, 0.5mg.  She does c/o SOB at times but is currently not SOB, no CP, no dizziness.

## 2018-06-16 NOTE — PLAN OF CARE
Great great grandson Siddharth notified of the family meeting in ICU with Dr. Jiang at 7am, and that the patient was moved to ICU for low heart rate. Grandson verbalized understanding.

## 2018-06-16 NOTE — H&P
[facility] Medicine  History & Physical    Patient Name: Roseanne Marroquin  MRN: [id number]  Admission Date: 6/15/2018  Attending Physician: Joe Jiang MD   Primary Care Provider: Liam Andrea MD         Patient information was obtained from patient and ER records.     Subjective:     Principal Problem:<principal problem not specified>    Chief Complaint:   Chief Complaint   Patient presents with    Loss of Consciousness     Pt presents via [facility]. Syncopal episode at home after sitting on front porch in sun from [id number]. Pt AAOx4 at this time. CBG 90.        HPI: 89 year old female presents to ED with h/o syncopal episode on her way to [facility] study yesterday evening.  Work up in ED reveals new onset Afib with NVR and UTI.  ROBERT score of 2. Started eliquis and placed on floor for telemetry monitoring and cipro started for UTI. She dropped into the low 30s overnight with several long pauses. Atropine was given once, 0.5mg.  She does c/o SOB at times but is currently not SOB, no CP, no dizziness.     Past Medical History:   Diagnosis Date    Diabetes mellitus     Hypertension        Past Surgical History:   Procedure Laterality Date    CHOLECYSTECTOMY      HERNIA REPAIR         Review of patient's allergies indicates:  No Known Allergies    No current facility-administered medications on file prior to encounter.      Current Outpatient Prescriptions on File Prior to Encounter   Medication Sig    amlodipine (NORVASC) 10 MG tablet Take 1 tablet (10 mg total) by mouth once daily.    hydrochlorothiazide (HYDRODIURIL) 25 MG tablet     hydrOXYzine (ATARAX) 25 MG tablet Take 25 mg by mouth 3 (three) times daily as needed for Itching.    ketorolac (TORADOL) 10 mg tablet Take 1 tablet (10 mg total) by mouth every 6 (six) hours.    lisinopril (PRINIVIL,ZESTRIL) 40 MG tablet Take 40 mg by mouth once daily.    meclizine (ANTIVERT) 12.5 mg tablet Take 1 tablet (12.5 mg total) by  mouth 3 (three) times daily as needed for Dizziness.    metformin (GLUCOPHAGE) 500 MG tablet      Family History     None        Social History Main Topics    Smoking status: Never Smoker    Smokeless tobacco: Never Used    Alcohol use No    Drug use: No    Sexual activity: No     Review of Systems   Constitutional: Negative.  Negative for activity change, appetite change, chills, diaphoresis, fatigue, fever and unexpected weight change.   HENT: Negative.  Negative for congestion, dental problem, drooling, ear discharge, ear pain, facial swelling, hearing loss, mouth sores, nosebleeds, postnasal drip, rhinorrhea, sinus pressure, sneezing, sore throat, tinnitus, trouble swallowing and voice change.    Eyes: Negative.  Negative for photophobia, pain, discharge, redness, itching and visual disturbance.   Respiratory: Positive for shortness of breath. Negative for apnea, cough, choking, chest tightness and wheezing.    Cardiovascular: Negative.  Negative for chest pain, palpitations and leg swelling.   Gastrointestinal: Positive for nausea. Negative for abdominal distention, abdominal pain, anal bleeding, blood in stool, constipation, diarrhea, rectal pain and vomiting.        GERD   Genitourinary: Negative.  Negative for difficulty urinating, dyspareunia, dysuria, enuresis, flank pain, frequency, hematuria, menstrual problem, pelvic pain, urgency, vaginal bleeding, vaginal discharge and vaginal pain.   Musculoskeletal: Negative.  Negative for arthralgias, back pain, gait problem, joint swelling, myalgias, neck pain and neck stiffness.   Skin: Negative.  Negative for color change, pallor, rash and wound.   Neurological: Positive for dizziness and syncope. Negative for tremors, seizures, facial asymmetry, speech difficulty, weakness, light-headedness, numbness and headaches.   Hematological: Negative for adenopathy. Does not bruise/bleed easily.   Psychiatric/Behavioral: Negative.  Negative for agitation,  behavioral problems, confusion, decreased concentration, dysphoric mood, hallucinations, self-injury, sleep disturbance and suicidal ideas. The patient is not nervous/anxious and is not hyperactive.      Objective:     Vital Signs (Most Recent):  Temp: 97 °F (36.1 °C) (06/16/18 0415)  Pulse: (!) 56 (06/16/18 0600)  Resp: (!) 23 (06/16/18 0600)  BP: (!) 166/86 (06/16/18 0600)  SpO2: 98 % (06/16/18 0600) Vital Signs (24h Range):  Temp:  [96.8 °F (36 °C)-97.2 °F (36.2 °C)] 97 °F (36.1 °C)  Pulse:  [51-72] 56  Resp:  [15-23] 23  SpO2:  [93 %-98 %] 98 %  BP: (130-202)/(61-97) 166/86     Weight: 78.2 kg (172 lb 6.4 oz)  Body mass index is 38.65 kg/m².    Physical Exam   Constitutional: She is oriented to person, place, and time. She appears well-developed and well-nourished.   HENT:   Head: Normocephalic and atraumatic.   Right Ear: External ear normal.   Left Ear: External ear normal.   Nose: Nose normal.   Mouth/Throat: Oropharynx is clear and moist.   Eyes: EOM are normal. Pupils are equal, round, and reactive to light.   Neck: Normal range of motion. Neck supple. No JVD present. No tracheal deviation present. No thyromegaly present.   Cardiovascular: Normal rate, normal heart sounds and intact distal pulses.    No murmur heard.  Pulmonary/Chest: Effort normal and breath sounds normal. No respiratory distress. She has no wheezes. She has no rales. She exhibits no tenderness.   Abdominal: Soft. Bowel sounds are normal. She exhibits no distension and no mass. There is no tenderness. There is no rebound and no guarding.   Musculoskeletal: Normal range of motion. She exhibits no edema or tenderness.   Lymphadenopathy:     She has no cervical adenopathy.   Neurological: She is alert and oriented to person, place, and time. She has normal reflexes. She displays normal reflexes. No cranial nerve deficit. She exhibits normal muscle tone. Coordination normal.   Skin: Skin is warm and dry. No rash noted. No erythema. No pallor.    Psychiatric: She has a normal mood and affect. Her behavior is normal. Judgment and thought content normal.         CRANIAL NERVES     CN III, IV, VI   Pupils are equal, round, and reactive to light.  Extraocular motions are normal.        Significant Labs:   CBC:   Recent Labs  Lab 06/15/18  1954   WBC 6.56   HGB 13.2   HCT 40.7        CMP:   Recent Labs  Lab 06/15/18  1954      K 4.5      CO2 27   GLU 99   BUN 24   CREATININE 1.4   CALCIUM 9.5   PROT 7.8   ALBUMIN 3.9   BILITOT 0.8   ALKPHOS 67   AST 18   ALT 13   ANIONGAP 10   EGFRNONAA 33*     Magnesium:   Recent Labs  Lab 06/16/18  0150   MG 2.4     TSH:   Recent Labs  Lab 06/15/18  1954   TSH 0.797     Urine Culture: No results for input(s): LABURIN in the last 48 hours.  Urine Studies:   Recent Labs  Lab 06/15/18  2030   COLORU Yellow   APPEARANCEUA Clear   PHUR 6.0   SPECGRAV <=1.005*   PROTEINUA Negative   GLUCUA Negative   KETONESU Negative   BILIRUBINUA Negative   OCCULTUA 2+*   NITRITE Negative   UROBILINOGEN Negative   LEUKOCYTESUR 1+*   RBCUA 12*   WBCUA 11*   BACTERIA Few*   SQUAMEPITHEL 1   HYALINECASTS 3*       Significant Imaging: I have reviewed and interpreted all pertinent imaging results/findings within the past 24 hours.    Assessment/Plan:     Sick sinus syndrome    Dropping into low 30s when asleep and has had syncopal episode. Needs a pacemaker. Will transfer to higher level of care           Heat syncope    I suspect her syncope was from bradycardia   Will work on transfer for cardiology eval and probable pacemaker           Acute cystitis with hematuria    U/a is not that impressive   Stop Cipro   Follow Cx             VTE Risk Mitigation         Ordered     apixaban tablet 2.5 mg  2 times daily      06/15/18 2227     Place LULU hose  Until discontinued      06/15/18 2227     Place sequential compression device  Until discontinued      06/15/18 2227     IP VTE HIGH RISK PATIENT  Once      06/15/18 2227        Critical  care time spent on the evaluation and treatment of severe organ dysfunction, review of pertinent labs and imaging studies, discussions with consulting providers and discussions with patient/family: 35 minutes.     Joe Jiang MD  Department of Hospital Medicine   [facility]

## 2018-06-16 NOTE — NURSING
Spoke with Dr. Jiang new orders noted, House Supervisor notified of orders to transfer patient to ICU. Report given to Alexandr TAYLOR.  Labs drawn stat per MD.

## 2018-06-17 PROBLEM — I10 ESSENTIAL HYPERTENSION: Status: ACTIVE | Noted: 2018-06-17

## 2018-06-17 PROBLEM — I48.91 ATRIAL FIBRILLATION WITH SLOW VENTRICULAR RESPONSE: Status: ACTIVE | Noted: 2018-06-17

## 2018-06-17 PROBLEM — E11.21 TYPE 2 DIABETES WITH NEPHROPATHY: Status: ACTIVE | Noted: 2018-06-17

## 2018-06-17 PROBLEM — R55 SYNCOPE AND COLLAPSE: Status: ACTIVE | Noted: 2018-06-17

## 2018-06-17 LAB
ANION GAP SERPL CALC-SCNC: 9 MMOL/L
AORTIC VALVE REGURGITATION: NORMAL
BACTERIA UR CULT: NO GROWTH
BASOPHILS # BLD AUTO: 0.02 K/UL
BASOPHILS NFR BLD: 0.4 %
BUN SERPL-MCNC: 21 MG/DL
CALCIUM SERPL-MCNC: 9 MG/DL
CHLORIDE SERPL-SCNC: 103 MMOL/L
CO2 SERPL-SCNC: 28 MMOL/L
CREAT SERPL-MCNC: 1.2 MG/DL
DIFFERENTIAL METHOD: ABNORMAL
EOSINOPHIL # BLD AUTO: 0.8 K/UL
EOSINOPHIL NFR BLD: 15 %
ERYTHROCYTE [DISTWIDTH] IN BLOOD BY AUTOMATED COUNT: 15.6 %
EST. GFR  (AFRICAN AMERICAN): 45.3 ML/MIN/1.73 M^2
EST. GFR  (NON AFRICAN AMERICAN): 39.3 ML/MIN/1.73 M^2
ESTIMATED PA SYSTOLIC PRESSURE: 29.01
GLOBAL PERICARDIAL EFFUSION: NORMAL
GLUCOSE SERPL-MCNC: 85 MG/DL
HCT VFR BLD AUTO: 41 %
HGB BLD-MCNC: 13.1 G/DL
IMM GRANULOCYTES # BLD AUTO: 0.01 K/UL
IMM GRANULOCYTES NFR BLD AUTO: 0.2 %
LYMPHOCYTES # BLD AUTO: 2.2 K/UL
LYMPHOCYTES NFR BLD: 41.5 %
MAGNESIUM SERPL-MCNC: 2.6 MG/DL
MCH RBC QN AUTO: 27.7 PG
MCHC RBC AUTO-ENTMCNC: 32 G/DL
MCV RBC AUTO: 87 FL
MONOCYTES # BLD AUTO: 0.3 K/UL
MONOCYTES NFR BLD: 6.5 %
NEUTROPHILS # BLD AUTO: 1.9 K/UL
NEUTROPHILS NFR BLD: 36.4 %
NRBC BLD-RTO: 0 /100 WBC
PLATELET # BLD AUTO: 191 K/UL
PMV BLD AUTO: 10.4 FL
POCT GLUCOSE: 108 MG/DL (ref 70–110)
POCT GLUCOSE: 88 MG/DL (ref 70–110)
POCT GLUCOSE: 95 MG/DL (ref 70–110)
POCT GLUCOSE: 95 MG/DL (ref 70–110)
POTASSIUM SERPL-SCNC: 4.1 MMOL/L
RBC # BLD AUTO: 4.73 M/UL
RETIRED EF AND QEF - SEE NOTES: 65 (ref 55–65)
SODIUM SERPL-SCNC: 140 MMOL/L
TRICUSPID VALVE REGURGITATION: NORMAL
WBC # BLD AUTO: 5.25 K/UL

## 2018-06-17 PROCEDURE — 93005 ELECTROCARDIOGRAM TRACING: CPT

## 2018-06-17 PROCEDURE — 25000003 PHARM REV CODE 250: Performed by: INTERNAL MEDICINE

## 2018-06-17 PROCEDURE — 93306 TTE W/DOPPLER COMPLETE: CPT | Mod: 26,,, | Performed by: INTERNAL MEDICINE

## 2018-06-17 PROCEDURE — 99222 1ST HOSP IP/OBS MODERATE 55: CPT | Mod: ,,, | Performed by: INTERNAL MEDICINE

## 2018-06-17 PROCEDURE — 93010 ELECTROCARDIOGRAM REPORT: CPT | Mod: ,,, | Performed by: INTERNAL MEDICINE

## 2018-06-17 PROCEDURE — 20600001 HC STEP DOWN PRIVATE ROOM

## 2018-06-17 PROCEDURE — 93306 TTE W/DOPPLER COMPLETE: CPT

## 2018-06-17 PROCEDURE — 36415 COLL VENOUS BLD VENIPUNCTURE: CPT

## 2018-06-17 PROCEDURE — 99233 SBSQ HOSP IP/OBS HIGH 50: CPT | Mod: ,,, | Performed by: HOSPITALIST

## 2018-06-17 PROCEDURE — 83735 ASSAY OF MAGNESIUM: CPT

## 2018-06-17 PROCEDURE — 80048 BASIC METABOLIC PNL TOTAL CA: CPT

## 2018-06-17 PROCEDURE — 85025 COMPLETE CBC W/AUTO DIFF WBC: CPT

## 2018-06-17 RX ADMIN — HYDRALAZINE HYDROCHLORIDE 25 MG: 25 TABLET, FILM COATED ORAL at 05:06

## 2018-06-17 RX ADMIN — PANTOPRAZOLE SODIUM 40 MG: 40 TABLET, DELAYED RELEASE ORAL at 08:06

## 2018-06-17 RX ADMIN — AMLODIPINE BESYLATE 10 MG: 10 TABLET ORAL at 08:06

## 2018-06-17 RX ADMIN — APIXABAN 2.5 MG: 2.5 TABLET, FILM COATED ORAL at 08:06

## 2018-06-17 RX ADMIN — HYDRALAZINE HYDROCHLORIDE 25 MG: 25 TABLET, FILM COATED ORAL at 02:06

## 2018-06-17 RX ADMIN — HYDRALAZINE HYDROCHLORIDE 25 MG: 25 TABLET, FILM COATED ORAL at 09:06

## 2018-06-17 NOTE — PROGRESS NOTES
Verified with MD Apodaca patient can have a consistent cardiac/ diabetic diet. Will continue to monitor.

## 2018-06-17 NOTE — ASSESSMENT & PLAN NOTE
89 y/o F with syncope. On telemetry she is bradycardic with HR below 50 bpm during sleep, while awake her heart rates are over 50bpm, therefore it is questionable if her syncope is actually due to bradycardia. This may very well be vasodepressor syncope or syncope secondary to intravascular volume depletion. According to the patient she takes HCTZ 50mg.     For now will observe on telemetry, hold eliquis for now in case she requires a permanent pacemaker. Avoid AV-michelle blockers. Keep NPO after midnight.  Paroxysmal AF noted on EKG , [id number] of atleast 5.

## 2018-06-17 NOTE — NURSING TRANSFER
Nursing Transfer Note      6/17/2018     Transfer From: [facility]    Transfer via EMS     Transfer with cardiac monitoring    Transported by EMS     Chart send with patient: YES

## 2018-06-17 NOTE — ASSESSMENT & PLAN NOTE
89 y/o F with syncope. On telemetry she is bradycardic with HR below 50 during sleep, while awake her  Heart rates are over 50, therefore it is questionable if her syncope is actually due to bradycardia. For now will observe on telemetry, hold eliquis for now in case she requires a permanent pacemaker. Avoid AV-michelle blockers. Keep NPO after midnight.  Paroxysmal AF noted on EKG , [id number] of atleast 5.

## 2018-06-17 NOTE — HPI
89F EP consulted for syncope in the setting of bradycardia. Yesterday was sitting outside at 6pm waiting to be picked up for [employer], started to experience lightheadedness while sitting and then stood up felt more lightheaded and thereafter syncopized. No N/V/D, no chest pain, no hx of seizures, remembers the episode. States that this is her first syncopal episode, no hx of AF. Denies cough, fever, dysuria. Has DM, HTN, on HCTZ, norvasc and lisinopril, not on any AV michelle blockers.         Her initial EKG was read as AF,subsquent EKG is sinus bradycardia, left axis deviation (present on previous EKg), and T-wave inversion in the anterior and lateral leads. Overnight telemetry with heart rate in the 50's during the day and heart rates decrease to as low as the 30's overnight with ventricular escape beats.    Pmhx of DM2 with nephropathy CKDIII, essential HTN.  Orthostatics were negative.  Started on NOAC yesterday night and thus far received 2 doses of eliquis 2.5mg

## 2018-06-17 NOTE — PLAN OF CARE
Problem: Patient Care Overview  Goal: Plan of Care Review  Outcome: Ongoing (interventions implemented as appropriate)  Patient remains free from falls and injuries through out shift. Patient AAO and VSS. Patient denies chest pain and SOB. Pt SB in the 30's throughout shift, EP consulted to determine if pacemaker is appropriate. Pt encourage to call when needing to ambulate. Plan of care reviewed with patient. Patient verbalizes understanding of plan.  Will continue to monitor.

## 2018-06-17 NOTE — PROGRESS NOTES
06/17/18 1216 06/17/18 1219 06/17/18 1223   Vital Signs   Pulse (!) 50 (!) 55 (!) 59   Heart Rate Source Monitor Monitor Monitor   Resp 16 --  --    SpO2 95 % (!) 91 % (!) 91 %   Pulse Oximetry Type --  Intermittent Intermittent   O2 Device (Oxygen Therapy) room air room air room air   BP (!) 126/58 (!) 150/69 131/62   MAP (mmHg) 84 99 89   BP Location Right arm Right arm Right arm   BP Method --  Automatic Automatic   Patient Position Lying Sitting Standing   Orthostatic Vital signs completed. Notified MD Booth, awaiting EKG to be done. Will continue to monitor.

## 2018-06-17 NOTE — PLAN OF CARE
Problem: Patient Care Overview  Goal: Plan of Care Review  Outcome: Ongoing (interventions implemented as appropriate)  Patient has sinus bradycardia with 1st degree AV Block. Episodes of junctional escape beats noted as heart rate dips into the 40's. No complaints except for indigestion/heartburn earlier. BP elevated prior to morning meds but is now stable. Fall precautions maintained. Plans to be transferred and evaluated for possible pacemaker insertion.

## 2018-06-17 NOTE — PLAN OF CARE
Problem: Patient Care Overview  Goal: Individualization & Mutuality  Outcome: Ongoing (interventions implemented as appropriate)  Plan of care discussed with patient. Patient is free of fall/trauma/injury. Denies CP, SOB, or pain/discomfort. Patient to be NPO at midnight again per cardiology. Echo completed at the bedside. EKG completed and shows Sinus Kendell with first degree block. Orthostatics completed and negative. BS monitored through Accucheck, well controlled. HR holding in 50s, no signs of syncopal episodes. BP well controlled. EP consulted. All questions addressed. Will continue to monitor

## 2018-06-17 NOTE — H&P
Hospital Medicine  History and Physical Exam    Team: [facility] Elio Martinez MD  Admit Date: 6/16/2018  RAMIREZ 6/19/2018  Principal Problem:  <principal problem not specified>   Patient information was obtained from patient, past medical records and ER records.   Primary care Physician: Liam Andrea MD  Code status: Full Code    HPI:   89F h/o DM, HTN, presenting with recent syncopal episode, per patient was sitting on stoop, felt flushed and passed out for about a second, no head trauma, no seizure activity, no CP/palpitations, reports has been having worsening dyspnea on exertion for last year. Denies h/o CP. At OSH EKG noted for new onset Afib with slow ventricular response, concern for SSS, and lianne to 30s overnight given atropine 0.5 x1, [place] [facility] [place] pacemaker by EP. Currently patient complaints of some substernal burning which is normal for her, improved with tums. Denies orthopnea, PND, LE edema.    Past Medical History: Patient has a past medical history of Diabetes mellitus and Hypertension.    Past Surgical History: Patient has a past surgical history that includes Hernia repair and Cholecystectomy.    Social History: Patient reports that she has never smoked. She has never used smokeless tobacco. She reports that she does not drink alcohol or use drugs.    Family History: family history is not on file.    Medications:   Prior to Admission medications    Medication Sig Start Date End Date Taking? Authorizing Provider   amlodipine (NORVASC) 10 MG tablet Take 1 tablet (10 mg total) by mouth once daily. 4/16/16   Fernando Baker MD   hydrochlorothiazide (HYDRODIURIL) 25 MG tablet  10/6/14   Historical Provider, MD   hydrOXYzine (ATARAX) 25 MG tablet Take 25 mg by mouth 3 (three) times daily as needed for Itching.    Historical Provider, MD   lisinopril (PRINIVIL,ZESTRIL) 40 MG tablet Take 40 mg by mouth once daily.    Historical Provider, MD   ketorolac (TORADOL) 10 mg tablet  Take 1 tablet (10 mg total) by mouth every 6 (six) hours. 1/21/18 6/16/18  Sergey Collins MD   meclizine (ANTIVERT) 12.5 mg tablet Take 1 tablet (12.5 mg total) by mouth 3 (three) times daily as needed for Dizziness. 4/16/16 6/16/18  Fernando Baker MD   metformin (GLUCOPHAGE) 500 MG tablet  12/17/14 6/16/18  Historical Provider, MD       Allergies: Patient has No Known Allergies.    ROS  Pain Scale: 0 /10   Constitutional: no fever or chills  Respiratory: no cough, + shortness of breath  Cardiovascular: no chest pain or palpitations  Gastrointestinal: no nausea or vomiting, no abdominal pain or change in bowel habits  Genitourinary: no hematuria or dysuria  Integument/Breast: no rash or pruritis  Hematologic/Lymphatic: no easy bruising or lymphadenopathy  Musculoskeletal: no arthralgias or myalgias  Neurological: no seizures or tremors  Behavioral/Psych: no depression or anxiety    PEx  Temp:  [96.8 °F (36 °C)-98 °F (36.7 °C)]   Pulse:  []   Resp:  [12-41]   BP: (114-190)/(60-95)   SpO2:  [91 %-98 %]   There is no height or weight on file to calculate BMI.     General appearance: no distress, elderly AA woman  Mental status: Alert and oriented x 3  HEENT:  conjunctivae/corneas clear, PERRL  Neck: supple, thyroid not enlarged  Pulm:   normal respiratory effort, CTA B, no c/w/r  Card: Bradycardic, regular, S1, S2 normal, no murmur, click, rub or gallop  Abd: soft, NT, ND, BS present; no masses, no organomegaly  Ext: no c/c/e  Pulses: 2+, symmetric  Skin: color, texture, turgor normal. No rashes or lesions  Neuro: CN II-XII grossly intact, no focal numbness or weakness, normal strength and tone       No intake or output data in the 24 hours ending 06/16/18 2137    Recent Labs  Lab 06/15/18  1954   WBC 6.56   HGB 13.2   HCT 40.7          Recent Labs  Lab 06/15/18  1954 06/16/18  0150     --    K 4.5  --      --    CO2 27  --    BUN 24  --    CREATININE 1.4  --    GLU 99  --    CALCIUM 9.5   --    MG  --  2.4       Recent Labs  Lab 06/15/18  1954   ALKPHOS 67   ALT 13   AST 18   ALBUMIN 3.9   PROT 7.8   BILITOT 0.8      No results for input(s): POCTGLUCOSE in the last 168 hours.  Recent Labs      06/15/18   1954   TROPONINI  0.011       No results for input(s): LACTATE in the last 72 hours.     Active Hospital Problems    Diagnosis  POA    Symptomatic bradycardia [R00.1]  Yes      Resolved Hospital Problems    Diagnosis Date Resolved POA   No resolved problems to display.       Overview  89F h/o HTN, DM, presenting after witnessed syncopal episode noted new A fib with slow vent response, concern for SSS with lianne to 30s, transferred to [facility] [place] for EP eval.    Assessment and Plan for Problems addressed today:    Syncopal Episode, likely cardiogenic, Afib with SVR  -EKG at OSH noted for new onset afib, slow vent response, in setting of recent syncopal episode, HDS at this time  -Telemetry  -Avoid AV michelle blocking agents  -Repeat EKG  -Will need EP consult in AM  -NPO at midnight for possible procedures  -TTE w. CFD pending  -Eliquis 2.5mg BID [id number] 3    ASHKAN vs CKD  -Noted Cr 1.4 from 1.1 2016, possibly CKD given HTN/DM hx  -Holding lisinopril, HCTZ, trending Cr  -F/u repeat BMP  -Avoid nephrotoxic agents    HTN  -Stable  -Holding lisinopril, HCTZ, continue amlodipine  -Add hydral 25mg TID until prior antihypertensives can be resumed  -PRN hydralazine SBP >180    DM  -Stable, off metformin  -Accuchecks ACHS  -DM diet  -SSI    DVT PPx: Eilquis  Dispo: Pending EP jun Martinez MD  Department of Hospital Medicine  Pager: [phone]  Spectra: [phone]

## 2018-06-17 NOTE — PROGRESS NOTES
Progress Note  Hospital Medicine    Provider [facility]: [facility] [place] [facility]  Admit Date: 6/16/2018  Encounter Date: 06/17/2018     SUBJECTIVE:     Follow-up Visit for: Symptomatic bradycardia    HPI (See H&P for complete P,F,SHx):  89F h/o DM2 with nephropathy CKDIII, essential HTN, paroxysmal atrial fibrillation here for evaluation of syncope. Per patient was sitting on stoop, felt flushed and passed out for about a second, no head trauma, no seizure activity, no CP/palpitations. Patient reports has been having worsening dyspnea on exertion for last year. Denies h/o CP. At OSH EKG noted for new onset Afib with slow ventricular response, concern for SSS, and lianne to 30s overnight given atropine 0.5 x1, [facility] [place] [facility] for pacemaker by EP. Currently patient complaints of some substernal burning which is normal for her, improved with tums. Denies orthopnea, PND, LE edema.    Interval history:  Patient offers no complaints today.  She and her family had many questions regarding the need for PPM. I explained that the electrophysiologist would go over in great detail R/B/A.    Review of Systems:  Review of Systems   Constitutional: Negative for chills and fever.   HENT: Negative for congestion and sore throat.    Eyes: Negative for photophobia, pain and discharge.   Respiratory: Negative for cough, hemoptysis, sputum production and shortness of breath.    Cardiovascular: Negative for chest pain, palpitations and leg swelling.   Gastrointestinal: Negative for abdominal pain, diarrhea, nausea and vomiting.   Genitourinary: Negative for dysuria and urgency.   Musculoskeletal: Negative for myalgias and neck pain.   Skin: Negative for itching and rash.   Neurological: Negative for sensory change, focal weakness and headaches.   Endo/Heme/Allergies: Negative for polydipsia. Does not bruise/bleed easily.   Psychiatric/Behavioral: Negative for depression and suicidal ideas.       OBJECTIVE:       Intake/Output Summary (Last  "24 hours) at 06/17/18 0900  Last data filed at 06/17/18 0742   Gross per 24 hour   Intake              240 ml   Output             1201 ml   Net             -961 ml     Vital Signs Range (Last 24H):  Temp:  [97.9 °F (36.6 °C)-98.3 °F (36.8 °C)]   Pulse:  [40-65]   Resp:  [12-31]   BP: (114-186)/(58-86)   SpO2:  [91 %-98 %]   Body mass index is 35.18 kg/m².    Objective:  General Appearance:  Comfortable, well-appearing, in no acute distress and not in pain.    Vital signs: (most recent): Blood pressure (!) 114/58, pulse (!) 50, temperature 97.9 °F (36.6 °C), temperature source Oral, resp. rate 16, height 4' 8" (1.422 m), weight 71.2 kg (156 lb 14.4 oz), SpO2 (!) 91 %, not currently breastfeeding.  No fever.    Output: Producing urine and producing stool.    HEENT: Normal HEENT exam.    Lungs:  Normal effort.  Breath sounds clear to auscultation.  She is not in respiratory distress.  No rales or wheezes.    Heart: Bradycardia.  Regular rhythm.  S1 normal and S2 normal.  No murmur.   Chest: Symmetric chest wall expansion.   Abdomen: Abdomen is soft.  Bowel sounds are normal.   There is no abdominal tenderness.     Extremities: Normal range of motion.  There is no deformity, effusion, dependent edema or local swelling.    Pulses: Distal pulses are intact.    Neurological: Patient is alert and oriented to person, place and time.  Normal strength.    Pupils:  Pupils are equal, round, and reactive to light.    Skin:  Warm and dry.      Medications:  Medication list was reviewed in EPIC and changes noted under Assessment/Plan and MAR.    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      Syncope and collapse  Symptomatic bradycardia  Atrial fibrillation with slow ventricular response  - EKG at OSH noted for new onset afib with SVR, in setting of recent syncopal episode, HDS at this time  - C/w cardiac monitoring  - Avoid AV michelle blocking agents  - Repeat EKG  - EP consulted  - TTE w. CFD pending  - Eliquis 2.5mg BID [id number] 3     ASHKAN vs CKD  - Noted Cr 1.4 from 1.1 2016, possibly CKD given HTN/DM2 hx  - Holding lisinopril, HCTZ, trending Cr  - F/u repeat BMP  - Avoid nephrotoxic agents     HTN  - Stable  - Holding lisinopril, HCTZ, continue amlodipine  - Add hydral 25mg TID until prior antihypertensives can be resumed  - PRN hydralazine SBP >180     DM  - Stable, off metformin  - Accuchecks ACHS  - SSI    Anticipated discharge date and disposition:   Pending EP evaluation.  Luiz Apodaca MD  [facility] Medicine

## 2018-06-17 NOTE — CONSULTS
[facility]  Cardiology  Consult Note    Patient Name: Roseanne Marroquin  MRN: [id number]  Admission Date: 6/16/2018  Hospital Length of Stay: 1 days  Code Status: Full Code   Attending Provider: Bruce Abraham MD   Consulting Provider: Deb Booth MD  Primary Care Physician: Liam Andrea MD  Principal Problem:Symptomatic bradycardia    Patient information was obtained from patient and ER records.     Inpatient consult to Electrophysiology  Consult performed by: DEB BOOTH  Consult ordered by: BRUCE ABRAHAM        Subjective:     Chief Complaint:  Syncope      HPI:   89F EP consulted for syncope in the setting of bradycardia. Yesterday was sitting outside at 6pm waiting to be picked up for [employer], started to experience lightheadedness while sitting and then stood up felt more lightheaded and thereafter syncopized. No N/V/D, no chest pain, no hx of seizures, remembers the episode. States that this is her first syncopal episode, no hx of AF. Denies cough, fever, dysuria. Has DM, HTN, on HCTZ, norvasc and lisinopril, not on any AV michelle blockers.         Her initial EKG was read as AF,subsquent EKG is sinus bradycardia HR 57bpm, left axis deviation (present on previous EKg), and T-wave inversion in the anterior and lateral leads. Overnight telemetry with heart rate in the 50's during the day and heart rates decrease to as low as the 30's overnight with ventricular escape beats.    Pmhx of DM2 with nephropathy CKDIII, essential HTN.  Orthostatics were negative.  Started on NOAC yesterday night and thus far received 2 doses of eliquis 2.5mg     Past Medical History:   Diagnosis Date    Diabetes mellitus     Hypertension        Past Surgical History:   Procedure Laterality Date    CHOLECYSTECTOMY      HERNIA REPAIR         Review of patient's allergies indicates:  No Known Allergies    Current Facility-Administered Medications on File Prior to Encounter   Medication     [COMPLETED] GI cocktail (mylanta 30 mL, lidocaine 2 % viscous 10 mL, dicyclomine 10 mL) 50 mL    [DISCONTINUED] amLODIPine tablet 10 mg    [DISCONTINUED] atropine 1 mg/ml injection 0.5 mg    [DISCONTINUED] hydroCHLOROthiazide tablet 25 mg    [DISCONTINUED] hydrOXYzine HCl tablet 25 mg    [DISCONTINUED] ketorolac tablet 10 mg    [DISCONTINUED] lisinopril tablet 40 mg    [DISCONTINUED] magnesium sulfate 3 g in sodium chloride 0.9% 250 mL IVPB    [DISCONTINUED] meclizine tablet 12.5 mg    [DISCONTINUED] ondansetron injection 4 mg    [DISCONTINUED] pantoprazole EC tablet 40 mg     Current Outpatient Prescriptions on File Prior to Encounter   Medication Sig    amlodipine (NORVASC) 10 MG tablet Take 1 tablet (10 mg total) by mouth once daily.    hydrochlorothiazide (HYDRODIURIL) 25 MG tablet     hydrOXYzine (ATARAX) 25 MG tablet Take 25 mg by mouth 3 (three) times daily as needed for Itching.    lisinopril (PRINIVIL,ZESTRIL) 40 MG tablet Take 40 mg by mouth once daily.     Family History     None        Social History Main Topics    Smoking status: Never Smoker    Smokeless tobacco: Never Used    Alcohol use No    Drug use: No    Sexual activity: No     Review of Systems   All other systems reviewed and are negative.    Objective:     Vital Signs (Most Recent):  Temp: 98 °F (36.7 °C) (06/17/18 1216)  Pulse: (!) 50 (06/17/18 1216)  Resp: 16 (06/17/18 1216)  BP: (!) 126/58 (06/17/18 1216)  SpO2: 95 % (06/17/18 1216) Vital Signs (24h Range):  Temp:  [97.9 °F (36.6 °C)-98.3 °F (36.8 °C)] 98 °F (36.7 °C)  Pulse:  [40-59] 50  Resp:  [12-20] 16  SpO2:  [91 %-98 %] 95 %  BP: (114-177)/(58-86) 126/58     Weight: 71.2 kg (156 lb 14.4 oz)  Body mass index is 35.18 kg/m².    SpO2: 95 %  O2 Device (Oxygen Therapy): room air      Intake/Output Summary (Last 24 hours) at 06/17/18 1216  Last data filed at 06/17/18 0742   Gross per 24 hour   Intake              240 ml   Output             1201 ml   Net              -961 ml       Lines/Drains/Airways     Peripheral Intravenous Line                 Peripheral IV - Single Lumen 06/15/18 1951 Right Antecubital 1 day                Physical Exam   Constitutional: She is oriented to person, place, and time. She appears well-developed and well-nourished.   HENT:   Head: Normocephalic and atraumatic.   Eyes: No scleral icterus.   Cardiovascular: Regular rhythm.    Bradycardia   Systolic murmur at the left sternal border    Pulmonary/Chest: Effort normal and breath sounds normal. No respiratory distress. She has no wheezes. She has no rales.   Musculoskeletal: She exhibits no edema.   Neurological: She is alert and oriented to person, place, and time.   Skin: Skin is warm.   Psychiatric: She has a normal mood and affect.       Significant Labs: All pertinent lab results from the last 24 hours have been reviewed.    Significant Imaging: EKG: As in HPI    Assessment and Plan:     * Symptomatic bradycardia    89 y/o F with syncope. On telemetry she is bradycardic with HR below 50 during sleep, while awake her heart rates are over 50 bpm, therefore it is questionable if her syncope is actually due to bradycardia. This may very well be vasodepressor syncope or syncope secondary to dehydration. According to the patient she is on HCTZ 50mg.     For now will observe on telemetry, hold eliquis for now in case she requires a permanent pacemaker. Avoid AV-michelle blockers. Keep NPO after midnight.  Paroxysmal AF noted on EKG , [id number] of atleast 5.             VTE Risk Mitigation         Ordered     apixaban tablet 2.5 mg  2 times daily      06/16/18 2145     IP VTE HIGH RISK PATIENT  Once      06/16/18 2132          Thank you for your consult. I will follow-up with patient. Please contact us if you have any additional questions.    Deb Booth MD  Cardiology   [facility]

## 2018-06-17 NOTE — PROGRESS NOTES
Spoke with Dr. Bermudez about pt HR dropping to 30's. DO, stated to place pacer pads at the bedside and continue to monitor. Will carry out orders as requested will continue to monitor.

## 2018-06-17 NOTE — PROGRESS NOTES
Plan of care discussed with patient. Patient is free of fall/trauma/injury. Denies CP, SOB, or pain/discomfort. Switched from NPO to Diabetic/ Cardiac diet. BS monitored through Accucheck, well controlled. HR holding in 50s, no signs of syncopal episodes. BP well controlled. EP consulted. All questions addressed. Will continue to monitor

## 2018-06-18 PROBLEM — R55 VASOVAGAL SYNCOPE: Status: ACTIVE | Noted: 2018-06-16

## 2018-06-18 LAB
ANION GAP SERPL CALC-SCNC: 9 MMOL/L
BASOPHILS # BLD AUTO: 0.02 K/UL
BASOPHILS NFR BLD: 0.4 %
BUN SERPL-MCNC: 22 MG/DL
CALCIUM SERPL-MCNC: 8.7 MG/DL
CHLORIDE SERPL-SCNC: 105 MMOL/L
CO2 SERPL-SCNC: 25 MMOL/L
CREAT SERPL-MCNC: 1.1 MG/DL
DIFFERENTIAL METHOD: ABNORMAL
EOSINOPHIL # BLD AUTO: 1.1 K/UL
EOSINOPHIL NFR BLD: 21.3 %
ERYTHROCYTE [DISTWIDTH] IN BLOOD BY AUTOMATED COUNT: 15.9 %
EST. GFR  (AFRICAN AMERICAN): 50.4 ML/MIN/1.73 M^2
EST. GFR  (NON AFRICAN AMERICAN): 43.7 ML/MIN/1.73 M^2
GLUCOSE SERPL-MCNC: 96 MG/DL
HCT VFR BLD AUTO: 40.2 %
HGB BLD-MCNC: 13 G/DL
IMM GRANULOCYTES # BLD AUTO: 0.02 K/UL
IMM GRANULOCYTES NFR BLD AUTO: 0.4 %
LYMPHOCYTES # BLD AUTO: 1.7 K/UL
LYMPHOCYTES NFR BLD: 33.1 %
MAGNESIUM SERPL-MCNC: 2.5 MG/DL
MCH RBC QN AUTO: 27.8 PG
MCHC RBC AUTO-ENTMCNC: 32.3 G/DL
MCV RBC AUTO: 86 FL
MONOCYTES # BLD AUTO: 0.3 K/UL
MONOCYTES NFR BLD: 5.7 %
NEUTROPHILS # BLD AUTO: 2.1 K/UL
NEUTROPHILS NFR BLD: 39.1 %
NRBC BLD-RTO: 0 /100 WBC
PLATELET # BLD AUTO: 193 K/UL
PMV BLD AUTO: 10.3 FL
POCT GLUCOSE: 103 MG/DL (ref 70–110)
POCT GLUCOSE: 109 MG/DL (ref 70–110)
POCT GLUCOSE: 128 MG/DL (ref 70–110)
POCT GLUCOSE: 98 MG/DL (ref 70–110)
POTASSIUM SERPL-SCNC: 4.1 MMOL/L
RBC # BLD AUTO: 4.67 M/UL
SODIUM SERPL-SCNC: 139 MMOL/L
WBC # BLD AUTO: 5.25 K/UL

## 2018-06-18 PROCEDURE — 99233 SBSQ HOSP IP/OBS HIGH 50: CPT | Mod: ,,, | Performed by: HOSPITALIST

## 2018-06-18 PROCEDURE — 80048 BASIC METABOLIC PNL TOTAL CA: CPT

## 2018-06-18 PROCEDURE — 85025 COMPLETE CBC W/AUTO DIFF WBC: CPT

## 2018-06-18 PROCEDURE — 36415 COLL VENOUS BLD VENIPUNCTURE: CPT

## 2018-06-18 PROCEDURE — 20600001 HC STEP DOWN PRIVATE ROOM

## 2018-06-18 PROCEDURE — 83735 ASSAY OF MAGNESIUM: CPT

## 2018-06-18 PROCEDURE — 25000003 PHARM REV CODE 250: Performed by: INTERNAL MEDICINE

## 2018-06-18 RX ORDER — ACETAMINOPHEN 325 MG/1
650 TABLET ORAL EVERY 6 HOURS PRN
Status: DISCONTINUED | OUTPATIENT
Start: 2018-06-18 | End: 2018-06-19

## 2018-06-18 RX ADMIN — HYDRALAZINE HYDROCHLORIDE 25 MG: 25 TABLET, FILM COATED ORAL at 10:06

## 2018-06-18 RX ADMIN — ACETAMINOPHEN 650 MG: 325 TABLET ORAL at 10:06

## 2018-06-18 RX ADMIN — HYDRALAZINE HYDROCHLORIDE 25 MG: 25 TABLET, FILM COATED ORAL at 01:06

## 2018-06-18 RX ADMIN — PANTOPRAZOLE SODIUM 40 MG: 40 TABLET, DELAYED RELEASE ORAL at 08:06

## 2018-06-18 RX ADMIN — AMLODIPINE BESYLATE 10 MG: 10 TABLET ORAL at 08:06

## 2018-06-18 RX ADMIN — HYDRALAZINE HYDROCHLORIDE 25 MG: 25 TABLET, FILM COATED ORAL at 05:06

## 2018-06-18 NOTE — PLAN OF CARE
Problem: Patient Care Overview  Goal: Plan of Care Review  Outcome: Ongoing (interventions implemented as appropriate)  Plan of care reviewed with the patient at the beginning of the shift. Pt admitted for symptomatic bradycardia. Tele monitoring continued. HR 40'50's. She is NPO for a pacemaker placement. Eliquis held. Blood glucose monitored ac and hs. Purewick in place. She denies pain, n/v/d. Fall precautions maintained. Pt remained free from falls and injury this shift. Bed locked in lowest position, side rails up x2, call light within reach. Instructed pt to call for assistance as needed. Pt verbalized understanding. Vitals stable. No acute issues overnight. Will continue to monitor.

## 2018-06-18 NOTE — SUBJECTIVE & OBJECTIVE
Interval History: patient in sinus lianne overnight while sleeping    Review of Systems   Constitution: Negative.   HENT: Negative.    Eyes: Negative.    Cardiovascular: Negative.    Respiratory: Negative.    Endocrine: Negative.    Skin: Negative.    Musculoskeletal: Negative.    Gastrointestinal: Negative.    Genitourinary: Negative.    Neurological:        LOC       Objective:     Vital Signs (Most Recent):  Temp: 98.3 °F (36.8 °C) (06/18/18 0716)  Pulse: (!) 56 (06/18/18 0800)  Resp: 16 (06/18/18 0716)  BP: (!) 140/65 (06/18/18 0716)  SpO2: (!) 91 % (06/18/18 0716) Vital Signs (24h Range):  Temp:  [97.8 °F (36.6 °C)-98.7 °F (37.1 °C)] 98.3 °F (36.8 °C)  Pulse:  [46-59] 56  Resp:  [14-18] 16  SpO2:  [91 %-95 %] 91 %  BP: (111-150)/(55-69) 140/65     Weight: 76.2 kg (167 lb 15.9 oz)  Body mass index is 37.66 kg/m².     SpO2: (!) 91 %  O2 Device (Oxygen Therapy): room air    Physical Exam   Constitutional: She is oriented to person, place, and time. She appears well-developed and well-nourished.   HENT:   Head: Normocephalic and atraumatic.   Eyes: Conjunctivae and EOM are normal. Pupils are equal, round, and reactive to light.   Neck: Normal range of motion. Neck supple. No JVD present.   Cardiovascular: Normal rate, regular rhythm and normal heart sounds.  Exam reveals no gallop and no friction rub.    No murmur heard.  Pulmonary/Chest: Effort normal and breath sounds normal. No respiratory distress. She has no wheezes. She has no rales. She exhibits no tenderness.   Abdominal: Soft. Bowel sounds are normal. She exhibits no distension. There is no tenderness.   Musculoskeletal: Normal range of motion. She exhibits no edema or tenderness.   Neurological: She is alert and oriented to person, place, and time.   Skin: Skin is warm and dry. No erythema. No pallor.       Significant Labs: EP:   Recent Labs  Lab 06/16/18  2109  06/17/18  0406 06/18/18  0747     --  140  --    K 4.1  --  4.1  --      --  103   --    CO2 27  --  28  --    *  --  85  --    BUN 20  --  21  --    CREATININE 1.4  --  1.2  --    CALCIUM 9.4  --  9.0  --    ANIONGAP 10  --  9  --    ESTGFRAFRICA 37.6*  --  45.3*  --    EGFRNONAA 32.6*  --  39.3*  --    WBC  --   --  5.25 5.25   HGB  --   --  13.1 13.0   HCT  --   < > 41.0 40.2   PLT  --   --  191 193   INR 1.0  --   --   --    < > = values in this interval not displayed.    Significant Imaging: Echocardiogram:   2D echo with color flow doppler:   Results for orders placed or performed during the hospital encounter of 06/16/18   2D echo with color flow doppler   Result Value Ref Range    EF 65 55 - 65    Aortic Valve Regurgitation TRIVIAL TO MILD     Est. PA Systolic Pressure 29.01     Pericardial Effusion TRIVIAL     Tricuspid Valve Regurgitation TRIVIAL     and EKG: Sinus bradycardia

## 2018-06-18 NOTE — ASSESSMENT & PLAN NOTE
89 y/o F with vasovagal syncope per her description (from sitting to standing.  Orthostatics were reportedly negative. On telemetry she is bradycardic with HR high 30s-40s during sleep, while awake her heart rates are over 50 bpm, therefore it is questionable if her syncope is actually due to bradycardia.     For now will observe on telemetry, continue to hold eliquis for now until her clinical course is delineated  Avoid AV-michelle blockers.   May feed patient  Recheck orthostatics  Paroxysmal AF noted on EKG , [id number] of atleast 5.   Check 2 d echo -- ef 65% no valvular disease

## 2018-06-18 NOTE — ASSESSMENT & PLAN NOTE
eliquis on hold for now  Patient otherwise asymptomatic  Paroxysmal in nature making cardioversion not necessary

## 2018-06-18 NOTE — PROGRESS NOTES
Progress Note  Hospital Medicine    Provider team:    [facility] HOSP MED C  [facility] ELECTROPHYSIOLOGY  Admit Date: 6/16/2018  Encounter Date: 06/18/2018     SUBJECTIVE:     Follow-up Visit for: Vasovagal syncope    HPI (See H&P for complete P,F,SHx):  89F h/o DM2 with nephropathy CKDIII, essential HTN, paroxysmal atrial fibrillation here for evaluation of syncope. Per patient was sitting on stoop, felt flushed and passed out for about a second, no head trauma, no seizure activity, no CP/palpitations. Patient reports has been having worsening dyspnea on exertion for last year. Denies h/o CP. At OSH EKG noted for new onset Afib with slow ventricular response, concern for SSS, and lianne to 30s overnight given atropine 0.5 x1, transferred to [facility] for eval for pacemaker by EP. Currently patient complaints of some substernal burning which is normal for her, improved with tums. Denies orthopnea, PND, LE edema.    Interval history:  Patient offers no complaints today.  EP is planning on c/w telemetric monitoring and deferring PPM placement for now.    Review of Systems:  Review of Systems   Constitutional: Negative for chills and fever.   HENT: Negative for congestion and sore throat.    Eyes: Negative for photophobia, pain and discharge.   Respiratory: Negative for cough, hemoptysis, sputum production and shortness of breath.    Cardiovascular: Negative for chest pain, palpitations and leg swelling.   Gastrointestinal: Negative for abdominal pain, diarrhea, nausea and vomiting.   Genitourinary: Negative for dysuria and urgency.   Musculoskeletal: Negative for myalgias and neck pain.   Skin: Negative for itching and rash.   Neurological: Negative for sensory change, focal weakness and headaches.   Endo/Heme/Allergies: Negative for polydipsia. Does not bruise/bleed easily.   Psychiatric/Behavioral: Negative for depression and suicidal ideas.       OBJECTIVE:       Intake/Output Summary (Last 24 hours) at 06/18/18 1057  Last data filed  "at 06/18/18 0600   Gross per 24 hour   Intake              660 ml   Output             1450 ml   Net             -790 ml     Vital Signs Range (Last 24H):  Temp:  [97.8 °F (36.6 °C)-98.7 °F (37.1 °C)]   Pulse:  [46-59]   Resp:  [14-18]   BP: (111-150)/(55-69)   SpO2:  [91 %-95 %]   Body mass index is 37.66 kg/m².    Objective:  General Appearance:  Comfortable, well-appearing, in no acute distress and not in pain.    Vital signs: (most recent): Blood pressure (!) 140/65, pulse (!) 56, temperature 98.3 °F (36.8 °C), temperature source Oral, resp. rate 16, height 4' 8" (1.422 m), weight 76.2 kg (167 lb 15.9 oz), SpO2 (!) 91 %, not currently breastfeeding.  No fever.    Output: Producing urine and producing stool.    HEENT: Normal HEENT exam.    Lungs:  Normal effort.  Breath sounds clear to auscultation.  She is not in respiratory distress.  No rales or wheezes.    Heart: Bradycardia.  Regular rhythm.  S1 normal and S2 normal.  No murmur.   Chest: Symmetric chest wall expansion.   Abdomen: Abdomen is soft.  Bowel sounds are normal.   There is no abdominal tenderness.     Extremities: Normal range of motion.  There is no deformity, effusion, dependent edema or local swelling.    Pulses: Distal pulses are intact.    Neurological: Patient is alert and oriented to person, place and time.  Normal strength.    Pupils:  Pupils are equal, round, and reactive to light.    Skin:  Warm and dry.      Medications:  Medication list was reviewed in EPIC and changes noted under Assessment/Plan and MAR.    Laboratory:  Recent Labs      06/18/18   0747   WBC  5.25   RBC  4.67   HGB  13.0   HCT  40.2   PLT  193   MCV  86   MCH  27.8   MCHC  32.3   GRAN  39.1  2.1   LYMPH  33.1  1.7   MONO  5.7  0.3   EOS  1.1*      Recent Labs      06/18/18   0747   GLU  96   NA  139   K  4.1   CL  105   CO2  25   BUN  22   CREATININE  1.1   CALCIUM  8.7   ANIONGAP  9   MG  2.5       ASSESSMENT/PLAN:     Active Hospital Problems    Diagnosis  POA    " *Vasovagal syncope [R55]  Yes    Type 2 diabetes with nephropathy [E11.21]  Yes    Atrial fibrillation with slow ventricular response [I48.91]  Yes    Essential hypertension [I10]  Yes    Syncope and collapse [R55]  Yes    Sick sinus syndrome [I49.5]  Yes      Resolved Hospital Problems    Diagnosis Date Resolved POA   No resolved problems to display.      Vasovagal syncope  Symptomatic bradycardia  Atrial fibrillation with slow ventricular response  - EKG at OSH noted for new onset afib with SVR, in setting of recent syncopal episode, HDS at this time  - C/w cardiac monitoring  - Avoid AV michelle blocking agents  - Repeat EKG  - EP consulted  - TTE w. CFD pending  - Hold eliquis in anticipation of possibly PPM placement     Acute renal failure superimposed on CKDIII  - Holding lisinopril, HCTZ, trending Cr  - Avoid nephrotoxic agents     HTN  - Stable  - Holding lisinopril, HCTZ, continue amlodipine  - Add hydral 25mg TID until prior antihypertensives can be resumed  - PRN hydralazine SBP >180     DM2, with nephropathy, CKDIII  - Stable, off metformin  - Accuchecks ACHS  - SSI    Anticipated discharge date and disposition:   Pending EP evaluation.  Luiz Apodaca MD  [facility] Medicine

## 2018-06-18 NOTE — PROGRESS NOTES
[facility]  Cardiac Electrophysiology  Progress Note    Admission Date: 6/16/2018  Code Status: Full Code   Attending Physician: Luiz Apodaca MD   Expected Discharge Date:   Principal Problem:Vasovagal syncope    Subjective:     Interval History: patient in sinus lianne overnight while sleeping    Review of Systems   Constitution: Negative.   HENT: Negative.    Eyes: Negative.    Cardiovascular: Negative.    Respiratory: Negative.    Endocrine: Negative.    Skin: Negative.    Musculoskeletal: Negative.    Gastrointestinal: Negative.    Genitourinary: Negative.    Neurological:        LOC       Objective:     Vital Signs (Most Recent):  Temp: 98.3 °F (36.8 °C) (06/18/18 0716)  Pulse: (!) 56 (06/18/18 0800)  Resp: 16 (06/18/18 0716)  BP: (!) 140/65 (06/18/18 0716)  SpO2: (!) 91 % (06/18/18 0716) Vital Signs (24h Range):  Temp:  [97.8 °F (36.6 °C)-98.7 °F (37.1 °C)] 98.3 °F (36.8 °C)  Pulse:  [46-59] 56  Resp:  [14-18] 16  SpO2:  [91 %-95 %] 91 %  BP: (111-150)/(55-69) 140/65     Weight: 76.2 kg (167 lb 15.9 oz)  Body mass index is 37.66 kg/m².     SpO2: (!) 91 %  O2 Device (Oxygen Therapy): room air    Physical Exam   Constitutional: She is oriented to person, place, and time. She appears well-developed and well-nourished.   HENT:   Head: Normocephalic and atraumatic.   Eyes: Conjunctivae and EOM are normal. Pupils are equal, round, and reactive to light.   Neck: Normal range of motion. Neck supple. No JVD present.   Cardiovascular: Normal rate, regular rhythm and normal heart sounds.  Exam reveals no gallop and no friction rub.    No murmur heard.  Pulmonary/Chest: Effort normal and breath sounds normal. No respiratory distress. She has no wheezes. She has no rales. She exhibits no tenderness.   Abdominal: Soft. Bowel sounds are normal. She exhibits no distension. There is no tenderness.   Musculoskeletal: Normal range of motion. She exhibits no edema or tenderness.   Neurological: She is  alert and oriented to person, place, and time.   Skin: Skin is warm and dry. No erythema. No pallor.       Significant Labs: EP:   Recent Labs  Lab 06/16/18  2109  06/17/18  0406 06/18/18  0747     --  140  --    K 4.1  --  4.1  --      --  103  --    CO2 27  --  28  --    *  --  85  --    BUN 20  --  21  --    CREATININE 1.4  --  1.2  --    CALCIUM 9.4  --  9.0  --    ANIONGAP 10  --  9  --    ESTGFRAFRICA 37.6*  --  45.3*  --    EGFRNONAA 32.6*  --  39.3*  --    WBC  --   --  5.25 5.25   HGB  --   --  13.1 13.0   HCT  --   < > 41.0 40.2   PLT  --   --  191 193   INR 1.0  --   --   --    < > = values in this interval not displayed.    Significant Imaging: Echocardiogram:   2D echo with color flow doppler:   Results for orders placed or performed during the hospital encounter of 06/16/18   2D echo with color flow doppler   Result Value Ref Range    EF 65 55 - 65    Aortic Valve Regurgitation TRIVIAL TO MILD     Est. PA Systolic Pressure 29.01     Pericardial Effusion TRIVIAL     Tricuspid Valve Regurgitation TRIVIAL     and EKG: Sinus bradycardia    Assessment and Plan:     * Vasovagal syncope       89 y/o F with vasovagal syncope per her description (from sitting to standing.  Orthostatics were reportedly negative. On telemetry she is bradycardic with HR high 30s-40s during sleep, while awake her heart rates are over 50 bpm, therefore it is questionable if her syncope is actually due to bradycardia.     For now will observe on telemetry, continue to hold eliquis for now until her clinical course is delineated  Avoid AV-michelle blockers.   May feed patient  Recheck orthostatics  Paroxysmal AF noted on EKG , [id number] of atleast 5.   Check 2 d echo -- ef 65% no valvular disease               Atrial fibrillation with slow ventricular response    eliquis on hold for now  Patient otherwise asymptomatic  Paroxysmal in nature making cardioversion not necessary              Manoj Asher MD  Cardiac  Electrophysiology  [facility]

## 2018-06-19 VITALS
OXYGEN SATURATION: 91 % | RESPIRATION RATE: 18 BRPM | SYSTOLIC BLOOD PRESSURE: 138 MMHG | BODY MASS INDEX: 37.79 KG/M2 | HEART RATE: 58 BPM | TEMPERATURE: 98 F | HEIGHT: 56 IN | DIASTOLIC BLOOD PRESSURE: 68 MMHG | WEIGHT: 168 LBS

## 2018-06-19 LAB
ANION GAP SERPL CALC-SCNC: 5 MMOL/L
BASOPHILS # BLD AUTO: 0.02 K/UL
BASOPHILS NFR BLD: 0.4 %
BUN SERPL-MCNC: 24 MG/DL
CALCIUM SERPL-MCNC: 8.4 MG/DL
CHLORIDE SERPL-SCNC: 104 MMOL/L
CO2 SERPL-SCNC: 29 MMOL/L
CREAT SERPL-MCNC: 1.2 MG/DL
DIFFERENTIAL METHOD: ABNORMAL
EOSINOPHIL # BLD AUTO: 0.9 K/UL
EOSINOPHIL NFR BLD: 19.9 %
ERYTHROCYTE [DISTWIDTH] IN BLOOD BY AUTOMATED COUNT: 15.9 %
EST. GFR  (AFRICAN AMERICAN): 45.3 ML/MIN/1.73 M^2
EST. GFR  (NON AFRICAN AMERICAN): 39.3 ML/MIN/1.73 M^2
GLUCOSE SERPL-MCNC: 91 MG/DL
HCT VFR BLD AUTO: 37.9 %
HGB BLD-MCNC: 12.1 G/DL
IMM GRANULOCYTES # BLD AUTO: 0.02 K/UL
IMM GRANULOCYTES NFR BLD AUTO: 0.4 %
LYMPHOCYTES # BLD AUTO: 1.9 K/UL
LYMPHOCYTES NFR BLD: 41 %
MAGNESIUM SERPL-MCNC: 2.5 MG/DL
MCH RBC QN AUTO: 27.6 PG
MCHC RBC AUTO-ENTMCNC: 31.9 G/DL
MCV RBC AUTO: 86 FL
MONOCYTES # BLD AUTO: 0.3 K/UL
MONOCYTES NFR BLD: 6.8 %
NEUTROPHILS # BLD AUTO: 1.5 K/UL
NEUTROPHILS NFR BLD: 31.5 %
NRBC BLD-RTO: 0 /100 WBC
PLATELET # BLD AUTO: 182 K/UL
PMV BLD AUTO: 10.5 FL
POCT GLUCOSE: 109 MG/DL (ref 70–110)
POCT GLUCOSE: 120 MG/DL (ref 70–110)
POTASSIUM SERPL-SCNC: 4 MMOL/L
RBC # BLD AUTO: 4.39 M/UL
SODIUM SERPL-SCNC: 138 MMOL/L
WBC # BLD AUTO: 4.73 K/UL

## 2018-06-19 PROCEDURE — 25000003 PHARM REV CODE 250: Performed by: INTERNAL MEDICINE

## 2018-06-19 PROCEDURE — 36415 COLL VENOUS BLD VENIPUNCTURE: CPT

## 2018-06-19 PROCEDURE — 80048 BASIC METABOLIC PNL TOTAL CA: CPT

## 2018-06-19 PROCEDURE — 83735 ASSAY OF MAGNESIUM: CPT

## 2018-06-19 PROCEDURE — 99238 HOSP IP/OBS DSCHRG MGMT 30/<: CPT | Mod: ,,, | Performed by: HOSPITALIST

## 2018-06-19 PROCEDURE — 85025 COMPLETE CBC W/AUTO DIFF WBC: CPT

## 2018-06-19 PROCEDURE — 25000003 PHARM REV CODE 250: Performed by: HOSPITALIST

## 2018-06-19 PROCEDURE — 99232 SBSQ HOSP IP/OBS MODERATE 35: CPT | Mod: ,,, | Performed by: INTERNAL MEDICINE

## 2018-06-19 RX ORDER — SIMETHICONE 80 MG
1 TABLET,CHEWABLE ORAL 3 TIMES DAILY PRN
Status: DISCONTINUED | OUTPATIENT
Start: 2018-06-19 | End: 2018-06-19 | Stop reason: HOSPADM

## 2018-06-19 RX ORDER — AMLODIPINE BESYLATE 10 MG/1
10 TABLET ORAL DAILY
Qty: 30 TABLET | Refills: 1 | Status: SHIPPED | OUTPATIENT
Start: 2018-06-19 | End: 2018-06-19

## 2018-06-19 RX ORDER — MECLIZINE HCL 12.5 MG 12.5 MG/1
12.5 TABLET ORAL 3 TIMES DAILY PRN
Qty: 20 TABLET | Refills: 0 | Status: SHIPPED | OUTPATIENT
Start: 2018-06-19 | End: 2018-07-09

## 2018-06-19 RX ORDER — LISINOPRIL 40 MG/1
40 TABLET ORAL DAILY
Qty: 30 TABLET | Refills: 1 | Status: SHIPPED | OUTPATIENT
Start: 2018-06-19 | End: 2021-05-11

## 2018-06-19 RX ORDER — METFORMIN HYDROCHLORIDE 500 MG/1
500 TABLET ORAL 2 TIMES DAILY WITH MEALS
Qty: 60 TABLET | Refills: 1 | Status: ON HOLD | OUTPATIENT
Start: 2018-06-19 | End: 2022-09-22 | Stop reason: HOSPADM

## 2018-06-19 RX ORDER — KETOROLAC TROMETHAMINE 10 MG/1
10 TABLET, FILM COATED ORAL EVERY 6 HOURS
Qty: 15 TABLET | Refills: 0 | Status: SHIPPED | OUTPATIENT
Start: 2018-06-19 | End: 2018-07-09

## 2018-06-19 RX ORDER — AMLODIPINE BESYLATE 10 MG/1
10 TABLET ORAL DAILY
Qty: 30 TABLET | Refills: 1 | Status: SHIPPED | OUTPATIENT
Start: 2018-06-19 | End: 2018-07-16 | Stop reason: DRUGHIGH

## 2018-06-19 RX ORDER — ACETAMINOPHEN 325 MG/1
650 TABLET ORAL EVERY 6 HOURS PRN
Status: DISCONTINUED | OUTPATIENT
Start: 2018-06-19 | End: 2018-06-19 | Stop reason: HOSPADM

## 2018-06-19 RX ADMIN — PANTOPRAZOLE SODIUM 40 MG: 40 TABLET, DELAYED RELEASE ORAL at 08:06

## 2018-06-19 RX ADMIN — AMLODIPINE BESYLATE 10 MG: 10 TABLET ORAL at 08:06

## 2018-06-19 RX ADMIN — SIMETHICONE CHEW TAB 80 MG 80 MG: 80 TABLET ORAL at 08:06

## 2018-06-19 RX ADMIN — HYDRALAZINE HYDROCHLORIDE 25 MG: 25 TABLET, FILM COATED ORAL at 05:06

## 2018-06-19 NOTE — ASSESSMENT & PLAN NOTE
89 y/o F with vasovagal syncope per her description (from sitting to standing.  Orthostatics were reportedly negative. On telemetry she is bradycardic with HR high 30s-40s during sleep, while awake her heart rates are over 50 bpm, therefore it is unlikely that her syncope is actually due to bradycardia.     Restart eliquis 5mg po bid (only 1/3 restrictive indices age being 89)  Avoid AV-michelle blockers.   Recheck orthostatics -- negative  Paroxysmal AF noted on EKG , [id number] of atleast 5.   Check 2 d echo -- ef 65% no valvular disease  Dc hctz, allow bp to run a little higher (140/90 range)  Follow up as o/p with gen cardiology in 2-3 weeks  EP will sign off, thank you for your consult.

## 2018-06-19 NOTE — PROGRESS NOTES
[facility]  Cardiac Electrophysiology  Progress Note    Admission Date: 6/16/2018  Code Status: Full Code   Attending Physician: Corrina Sanchez MD   Expected Discharge Date: 6/19/2018  Principal Problem:Vasovagal syncope    Subjective:     Interval History: bradycardic while asleep overnight, in and out of 1fib hr in 50-60 while awake, narrow complex    Review of Systems   Constitution: Negative.   HENT: Negative.    Eyes: Negative.    Cardiovascular: Negative.    Respiratory: Negative.    Endocrine: Negative.    Skin: Negative.    Musculoskeletal: Negative.    Gastrointestinal: Negative.    Genitourinary: Negative.    Neurological: Negative.      Objective:     Vital Signs (Most Recent):  Temp: 97.4 °F (36.3 °C) (06/19/18 0730)  Pulse: 63 (06/19/18 0737)  Resp: 18 (06/19/18 0737)  BP: (!) 146/70 (06/19/18 0737)  SpO2: (!) 94 % (06/19/18 0737) Vital Signs (24h Range):  Temp:  [97.4 °F (36.3 °C)-98.3 °F (36.8 °C)] 97.4 °F (36.3 °C)  Pulse:  [47-63] 63  Resp:  [16-18] 18  SpO2:  [92 %-95 %] 94 %  BP: (105-157)/(58-74) 146/70     Weight: 76.2 kg (167 lb 15.9 oz)  Body mass index is 37.66 kg/m².     SpO2: (!) 94 %  O2 Device (Oxygen Therapy): room air    Physical Exam   Constitutional: She is oriented to person, place, and time. She appears well-developed and well-nourished.   HENT:   Head: Normocephalic and atraumatic.   Eyes: Conjunctivae and EOM are normal. Pupils are equal, round, and reactive to light.   Neck: Normal range of motion. Neck supple. No JVD present.   Cardiovascular: Normal rate, regular rhythm and normal heart sounds.  Exam reveals no gallop and no friction rub.    No murmur heard.  Pulmonary/Chest: Effort normal and breath sounds normal. No respiratory distress. She has no wheezes. She has no rales. She exhibits no tenderness.   Abdominal: Soft. Bowel sounds are normal. She exhibits no distension. There is no tenderness.   Musculoskeletal: Normal range of motion. She exhibits no  edema or tenderness.   Neurological: She is alert and oriented to person, place, and time.   Skin: Skin is warm and dry. No erythema. No pallor.       Significant Labs:   EP:   Recent Labs  Lab 06/18/18  0747 06/19/18  0458    138   K 4.1 4.0    104   CO2 25 29   GLU 96 91   BUN 22 24   CREATININE 1.1 1.2   CALCIUM 8.7 8.4*   ANIONGAP 9 5*   ESTGFRAFRICA 50.4* 45.3*   EGFRNONAA 43.7* 39.3*   WBC 5.25 4.73   HGB 13.0 12.1   HCT 40.2 37.9    182       Significant Imaging: Echocardiogram:   2D echo with color flow doppler:   Results for orders placed or performed during the hospital encounter of 06/16/18   2D echo with color flow doppler   Result Value Ref Range    EF 65 55 - 65    Aortic Valve Regurgitation TRIVIAL TO MILD     Est. PA Systolic Pressure 29.01     Pericardial Effusion TRIVIAL     Tricuspid Valve Regurgitation TRIVIAL     and EKG: afib with slow v rate    Assessment and Plan:     * Vasovagal syncope       89 y/o F with vasovagal syncope per her description (from sitting to standing.  Orthostatics were reportedly negative. On telemetry she is bradycardic with HR high 30s-40s during sleep, while awake her heart rates are over 50 bpm, therefore it is unlikely that her syncope is actually due to bradycardia.     Restart eliquis 5mg po bid (only 1/3 restrictive indices age being 89)  Avoid AV-michelle blockers.   Recheck orthostatics -- negative  Paroxysmal AF noted on EKG , [id number] of atleast 5.   Check 2 d echo -- ef 65% no valvular disease  Dc hctz, allow bp to run a little higher (140/90 range)  Follow up as o/p with gen cardiology in 2-3 weeks  EP will sign off, thank you for your consult.               Atrial fibrillation with slow ventricular response    eliquis on hold for now  Patient otherwise asymptomatic  Paroxysmal in nature making cardioversion not necessary              Manoj Asher MD  Cardiac Electrophysiology  [facility]

## 2018-06-19 NOTE — PROGRESS NOTES
06/19/18 0730 06/19/18 0733 06/19/18 0737   Vital Signs   Pulse 61 (!) 59 63   Heart Rate Source Monitor Monitor Monitor   Resp 18 16 18   SpO2 (!) 92 % (!) 94 % (!) 94 %   Pulse Oximetry Type Intermittent Intermittent Intermittent   O2 Device (Oxygen Therapy) room air room air room air   BP (!) 152/73 (!) 157/74 (!) 146/70   MAP (mmHg) 105 107 101   BP Location Right arm Right arm Right arm   BP Method Automatic Automatic Automatic   Patient Position Lying Sitting Standing   Repeat Orthostatics completed. Notified MD Sanchez.     Also notified MD patient complaining of gas pain, MD stated she will put in simethicone order.    Patient also got out of bed without calling for assistance after repeated instruction throughout the day yesterday to call for someone due to risk for syncope. Bed alarm activated, patient instructed again to call for assistance.

## 2018-06-19 NOTE — SUBJECTIVE & OBJECTIVE
Interval History: bradycardic while asleep overnight, in and out of 1fib hr in 50-60 while awake, narrow complex    Review of Systems   Constitution: Negative.   HENT: Negative.    Eyes: Negative.    Cardiovascular: Negative.    Respiratory: Negative.    Endocrine: Negative.    Skin: Negative.    Musculoskeletal: Negative.    Gastrointestinal: Negative.    Genitourinary: Negative.    Neurological: Negative.      Objective:     Vital Signs (Most Recent):  Temp: 97.4 °F (36.3 °C) (06/19/18 0730)  Pulse: 63 (06/19/18 0737)  Resp: 18 (06/19/18 0737)  BP: (!) 146/70 (06/19/18 0737)  SpO2: (!) 94 % (06/19/18 0737) Vital Signs (24h Range):  Temp:  [97.4 °F (36.3 °C)-98.3 °F (36.8 °C)] 97.4 °F (36.3 °C)  Pulse:  [47-63] 63  Resp:  [16-18] 18  SpO2:  [92 %-95 %] 94 %  BP: (105-157)/(58-74) 146/70     Weight: 76.2 kg (167 lb 15.9 oz)  Body mass index is 37.66 kg/m².     SpO2: (!) 94 %  O2 Device (Oxygen Therapy): room air    Physical Exam   Constitutional: She is oriented to person, place, and time. She appears well-developed and well-nourished.   HENT:   Head: Normocephalic and atraumatic.   Eyes: Conjunctivae and EOM are normal. Pupils are equal, round, and reactive to light.   Neck: Normal range of motion. Neck supple. No JVD present.   Cardiovascular: Normal rate, regular rhythm and normal heart sounds.  Exam reveals no gallop and no friction rub.    No murmur heard.  Pulmonary/Chest: Effort normal and breath sounds normal. No respiratory distress. She has no wheezes. She has no rales. She exhibits no tenderness.   Abdominal: Soft. Bowel sounds are normal. She exhibits no distension. There is no tenderness.   Musculoskeletal: Normal range of motion. She exhibits no edema or tenderness.   Neurological: She is alert and oriented to person, place, and time.   Skin: Skin is warm and dry. No erythema. No pallor.       Significant Labs:   EP:   Recent Labs  Lab 06/18/18  0747 06/19/18  0458    138   K 4.1 4.0    104    CO2 25 29   GLU 96 91   BUN 22 24   CREATININE 1.1 1.2   CALCIUM 8.7 8.4*   ANIONGAP 9 5*   ESTGFRAFRICA 50.4* 45.3*   EGFRNONAA 43.7* 39.3*   WBC 5.25 4.73   HGB 13.0 12.1   HCT 40.2 37.9    182       Significant Imaging: Echocardiogram:   2D echo with color flow doppler:   Results for orders placed or performed during the hospital encounter of 06/16/18   2D echo with color flow doppler   Result Value Ref Range    EF 65 55 - 65    Aortic Valve Regurgitation TRIVIAL TO MILD     Est. PA Systolic Pressure 29.01     Pericardial Effusion TRIVIAL     Tricuspid Valve Regurgitation TRIVIAL     and EKG: afib with slow v rate

## 2018-06-19 NOTE — PLAN OF CARE
Problem: Patient Care Overview  Goal: Plan of Care Review  Reviewed plan of care w/ pt. Pt free of falls, bed in locked and lowest position. Pt still bradycardic, rate 40's-50's. Pt being worked up for possible pacer placement.

## 2018-06-19 NOTE — PROGRESS NOTES
Patient is ready for discharge. Patient stable alert and oriented. IVs removed. No complaints of pain. Discussed discharge plan. Reviewed medications and side effects, appointments, and answered questions with patient and family.Explained that blood pressure is in a range, it is not going to stay at an exact number throughout the day, it will fluctuate but the medication controls the blood pressure. Family had difficulty understanding that BP is not at a set number rather a range. Also explained to family that HR was monitored and orthostatic Vitals were done to see if there would be a change in vitals and there was not. Exlained to family thoroughly that patient was cleared by cardiology and primary team to go home, patient had no issues that while here and the teams did not see any medical reason patient would need to stay. Patient's family members questioned D/C, offered multiple times to have primary team come to the bedside and explain more to the patient and family but it was declined by the family multiple times. Cardiology happened to be in the toro rounding on patients, stated they could swing by after and informed family and they declined to wait but would listen if team stopped by prior to discharge.

## 2018-06-19 NOTE — DISCHARGE SUMMARY
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       Discharged Condition: stable    HOSPITAL COURSE:      Initial Presentation:    89F h/o DM2 with nephropathy CKDIII, essential HTN, paroxysmal atrial fibrillation here for evaluation of syncope. Per patient was sitting on stoop, felt flushed and passed out for about a second, no head trauma, no seizure activity, no CP/palpitations. Patient reports has been having worsening dyspnea on exertion for last year. Denies h/o CP. At OSH EKG noted for new onset Afib with slow ventricular response, concern for SSS, and lianne to 30s overnight given atropine 0.5 x1, transferred to [facility] for eval for pacemaker by EP. Currently patient complaints of some substernal burning which is normal for her, improved with tums. Denies orthopnea, PND, LE edema.    Course of Principle Problem for Admission:    EP was consulted on admission for evaluation for need for PPM.  Pt remained asymptomatic for duration of admission, with appropriate exertion-induced chronotropic response.  EP felt PPM not indicated and pt to f/u with General Cardiology as outpatient.    Other Medical Problems Addressed in the Hospital:    Acute renal failure superimposed on CKDIII  - Holding lisinopril, HCTZ, trending Cr  - Avoid nephrotoxic agents     HTN  - Stable  - Holding lisinopril, HCTZ, continue amlodipine  - Add  hydral 25mg TID until prior antihypertensives can be resumed  - PRN hydralazine SBP >180     DM2, with nephropathy, CKDIII  - Stable, off metformin  - Accuchecks ACHS  - SSI    Consults: Cardiology    Last CBC/BMP:    CBC/Anemia Labs: Coags:      Recent Labs  Lab 06/17/18  0406 06/18/18  0747 06/19/18  0458   WBC 5.25 5.25 4.73   HGB 13.1 13.0 12.1   HCT 41.0 40.2 37.9    193 182   MCV 87 86 86   RDW 15.6* 15.9* 15.9*      Recent Labs  Lab 06/16/18  2109   INR 1.0   APTT 24.6        Chemistries:     Recent Labs  Lab 06/15/18  1954  06/17/18  0406 06/18/18  0747 06/19/18  0458     < > 140 139 138   K 4.5  < > 4.1 4.1 4.0     < > 103 105 104   CO2 27  < > 28 25 29   BUN 24  < > 21 22 24   CREATININE 1.4  < > 1.2 1.1 1.2   CALCIUM 9.5  < > 9.0 8.7 8.4*   PROT 7.8  --   --   --   --    BILITOT 0.8  --   --   --   --    ALKPHOS 67  --   --   --   --    ALT 13  --   --   --   --    AST 18  --   --   --   --    MG  --   < > 2.6 2.5 2.5   < > = values in this interval not displayed.       Significant Diagnostic Studies:     ECHO 6/18/2018:    1 - Upper limit of normal left ventricular enlargement.     2 - Normal left ventricular systolic function (EF 60-65%).     3 - No wall motion abnormalities.     4 - Indeterminate LV diastolic function.     5 - Biatrial enlargement.     6 - Right ventricle is upper limit of normal in size with normal systolic function.     7 - Trivial to mild aortic regurgitation.     8 - Trivial tricuspid regurgitation.     9 - Trivial pericardial effusion.     10 - The estimated PA systolic pressure is 29 mmHg.     Special Treatments/Procedures:   * No surgery found *     Disposition: Home or Self Care      Future Scheduled Appointments:  Future Appointments  Date Time Provider Department Center   7/2/2018 10:20 AM Santiago Cooper MD [facility] CARDIO [facility] y     Discharge Medication List:     Roseanne Marroquin   Home Medication Instructions ALYSE:[id number]    Printed on:06/19/18 7212    Medication Information                      amLODIPine (NORVASC) 10 MG tablet  Take 1 tablet (10 mg total) by mouth once daily.             ketorolac (TORADOL) 10 mg tablet  Take 1 tablet (10 mg total) by mouth every 6 (six) hours.             lisinopril (PRINIVIL,ZESTRIL) 40 MG tablet  Take 1 tablet (40 mg total) by mouth once daily.             meclizine (ANTIVERT) 12.5 mg tablet  Take 1 tablet (12.5 mg total) by mouth 3 (three) times daily as needed for Dizziness.             metFORMIN (GLUCOPHAGE) 500 MG tablet  Take 1 tablet (500 mg total) by mouth 2 (two) times daily with meals.                 Patient Instructions:    Discharge Procedure Orders  Activity as tolerated     Notify your health care provider if you experience any of the following:  persistent dizziness, light-headedness, or visual disturbances     Notify your health care provider if you experience any of the following:  increased confusion or weakness         At the time of discharge patient was told to take all medications as prescribed, to keep all followup appointments, and to call their primary care physician or return to the emergency room if they have any worsening or concerning symptoms.    Signing Physician:  Corrina Sanchez MD

## 2018-06-19 NOTE — PLAN OF CARE
06/19/18 1452   Final Note   Assessment Type Final Discharge Note   Discharge Disposition Home   Hospital Follow Up  Appt(s) scheduled? Yes

## 2018-06-21 LAB
BACTERIA BLD CULT: NORMAL
BACTERIA BLD CULT: NORMAL

## 2018-07-09 ENCOUNTER — TELEPHONE (OUTPATIENT)
Dept: INTERNAL MEDICINE | Facility: CLINIC | Age: 83
End: 2018-07-09

## 2018-07-09 ENCOUNTER — OFFICE VISIT (OUTPATIENT)
Dept: INTERNAL MEDICINE | Facility: CLINIC | Age: 83
End: 2018-07-09
Payer: MEDICARE

## 2018-07-09 VITALS
OXYGEN SATURATION: 97 % | WEIGHT: 164.44 LBS | HEART RATE: 35 BPM | SYSTOLIC BLOOD PRESSURE: 128 MMHG | RESPIRATION RATE: 16 BRPM | HEIGHT: 64 IN | BODY MASS INDEX: 28.07 KG/M2 | DIASTOLIC BLOOD PRESSURE: 62 MMHG

## 2018-07-09 DIAGNOSIS — I48.91 ATRIAL FIBRILLATION WITH SLOW VENTRICULAR RESPONSE: ICD-10-CM

## 2018-07-09 DIAGNOSIS — K21.9 GASTROESOPHAGEAL REFLUX DISEASE WITHOUT ESOPHAGITIS: ICD-10-CM

## 2018-07-09 DIAGNOSIS — I10 ESSENTIAL HYPERTENSION: Primary | ICD-10-CM

## 2018-07-09 DIAGNOSIS — E11.21 TYPE 2 DIABETES WITH NEPHROPATHY: ICD-10-CM

## 2018-07-09 PROCEDURE — 99204 OFFICE O/P NEW MOD 45 MIN: CPT | Mod: S$PBB | Performed by: INTERNAL MEDICINE

## 2018-07-09 PROCEDURE — 99213 OFFICE O/P EST LOW 20 MIN: CPT | Mod: PBBFAC | Performed by: INTERNAL MEDICINE

## 2018-07-09 PROCEDURE — 99999 PR PBB SHADOW E&M-EST. PATIENT-LVL III: CPT | Mod: PBBFAC,,, | Performed by: INTERNAL MEDICINE

## 2018-07-09 PROCEDURE — 99999 PR STA SHADOW: CPT | Mod: PBBFAC,,, | Performed by: INTERNAL MEDICINE

## 2018-07-09 RX ORDER — DOXAZOSIN 2 MG/1
TABLET ORAL
Refills: 5 | COMMUNITY
Start: 2018-06-15 | End: 2021-02-16 | Stop reason: DRUGHIGH

## 2018-07-09 RX ORDER — METOPROLOL TARTRATE 50 MG/1
50 TABLET ORAL DAILY
Refills: 5 | COMMUNITY
Start: 2018-06-15 | End: 2018-07-16 | Stop reason: DRUGHIGH

## 2018-07-09 RX ORDER — OMEPRAZOLE 40 MG/1
40 CAPSULE, DELAYED RELEASE ORAL DAILY
Qty: 30 CAPSULE | Refills: 5 | Status: SHIPPED | OUTPATIENT
Start: 2018-07-09 | End: 2019-02-12 | Stop reason: SDUPTHER

## 2018-07-09 NOTE — PROGRESS NOTES
Subjective:       Patient ID: Roseanne Marroquin is a 89 y.o. female.    Chief Complaint: Establish Care; Hypertension; and Diabetes    Roseanne Marroquin is a 89 y.o. female  Here to establish as a new patient .  She was seeing Dr Andrea.  Seen dr fofana in past for a fib.        Hypertension   This is a chronic problem. The current episode started more than 1 year ago. The problem has been resolved since onset. The problem is controlled. Pertinent negatives include no chest pain or palpitations. Past treatments include calcium channel blockers, beta blockers and ACE inhibitors. The current treatment provides significant improvement.   Diabetes   She presents for her follow-up diabetic visit. She has type 2 diabetes mellitus. Her disease course has been stable. Pertinent negatives for hypoglycemia include no confusion, dizziness, nervousness/anxiousness or pallor. Pertinent negatives for diabetes include no chest pain. Current diabetic treatment includes oral agent (monotherapy). An ACE inhibitor/angiotensin II receptor blocker is being taken.     Review of Systems   Constitutional: Negative for chills and fever.   HENT: Negative for congestion, hearing loss, sinus pressure and sore throat.    Eyes: Negative for photophobia.   Respiratory: Negative for cough, choking, chest tightness and wheezing.    Cardiovascular: Negative for chest pain and palpitations.   Gastrointestinal: Negative for blood in stool, nausea and vomiting.        Acidic stomach   Genitourinary: Negative for dysuria and hematuria.   Musculoskeletal: Negative for arthralgias and myalgias.   Skin: Negative for pallor.   Neurological: Negative for dizziness and numbness.   Hematological: Does not bruise/bleed easily.   Psychiatric/Behavioral: Negative for confusion and suicidal ideas. The patient is not nervous/anxious.        Objective:      Physical Exam   Constitutional: She is oriented to person, place, and time. She appears well-developed and  well-nourished.   HENT:   Head: Normocephalic and atraumatic.   Cardiovascular: Normal rate, regular rhythm and normal heart sounds.    Pulmonary/Chest: Effort normal and breath sounds normal.   Abdominal: Soft. Bowel sounds are normal. There is no tenderness.   Musculoskeletal: She exhibits no edema.   +1 edema. bipedal   Neurological: She is alert and oriented to person, place, and time.   Skin: Skin is warm and dry.   Psychiatric: She has a normal mood and affect. Her behavior is normal. Judgment and thought content normal.   Nursing note and vitals reviewed.      Assessment:       1. Essential hypertension    2. Type 2 diabetes with nephropathy    3. Atrial fibrillation with slow ventricular response    4. Gastroesophageal reflux disease without esophagitis        Plan:   Roseanne was seen today for establish care, hypertension and diabetes.    Diagnoses and all orders for this visit:    Essential hypertension  -     CBC auto differential; Future  -     Comprehensive metabolic panel; Future  -     Lipid panel; Future    Well controlled.  Continue same medication and dose.  1. Keep weight close to ideal body weight.   2.   Avoid high salt foods (olives, pickles, smoked meats, salted potato chips, etc.).   Do not add salt to your food at the table.   Use only small amounts of salt when cooking.   3. Begin an exercise program. Discuss with your doctor what type of exercise program would be best for you. It doesn't have to be difficult. Even brisk walking for 20 minutes three times a week is a good form of exercise.   4. Avoid medicines which contain heart stimulants. This includes many cold and sinus decongestant pills and sprays as well as diet pills. Check the warnings about hypertension on the label. Stimulants such as amphetamine or cocaine could be lethal for someone with hypertension. Never take these.    Type 2 diabetes with nephropathy  -     Lipid panel; Future  -     Microalbumin/creatinine urine ratio;  "Future  -     Hemoglobin A1c; Future  Patient has Diabetes .  We discussed about diet ;low in calories. Avoid sweats, sodas.  Also increasing activity;walking 2-3 miles a day.  I also adjusted medications and gave patient  instructions about adherence to plan.  Goal of  A1c  less than 7 % stressed.  Also goal of LDL less than 70 highlighted to patient.  Atrial fibrillation with slow ventricular response  -     TSH; Future  -     Ambulatory referral to Cardiology  Needs to see cardiology     Gastroesophageal reflux disease without esophagitis  -     omeprazole (PRILOSEC) 40 MG capsule; Take 1 capsule (40 mg total) by mouth once daily.    Tips to Control Acid Reflux  To control acid reflux, youll need to make some basic diet and lifestyle changes. The simple steps outlined below may be all youll need to relieve discomfort.  · Avoid fatty foods and spicy foods.  · Eat fewer acidic foods, such as citrus and tomato-based foods. These can increase symptoms.  · Limit drinking alcohol, caffeine, and fizzy beverages. All increase acid reflux.  · Try limiting chocolate, peppermint, and spearmint. These can worsen acid reflux in some people.  Watch When You Eat  · Avoid lying down for 3 hours after eating.  · Do not snack before going to bed.  Raise Your Head    Raising your head and upper body by 4" to 6" helps limit reflux when youre lying down. Put blocks under the head of the bed frame to raise it.    "

## 2018-07-09 NOTE — TELEPHONE ENCOUNTER
Cardiology referral to Dr. Nowak scheduled with patient for 7/10/18 at 1:00pm.  at [facility] states that she can pull referral information, so nothing needs to be faxed.

## 2018-07-10 ENCOUNTER — LAB VISIT (OUTPATIENT)
Dept: LAB | Facility: HOSPITAL | Age: 83
End: 2018-07-10
Attending: INTERNAL MEDICINE
Payer: MEDICARE

## 2018-07-10 DIAGNOSIS — I10 ESSENTIAL HYPERTENSION: ICD-10-CM

## 2018-07-10 DIAGNOSIS — E11.21 TYPE 2 DIABETES WITH NEPHROPATHY: ICD-10-CM

## 2018-07-10 DIAGNOSIS — I48.91 ATRIAL FIBRILLATION WITH SLOW VENTRICULAR RESPONSE: ICD-10-CM

## 2018-07-10 LAB
ALBUMIN SERPL BCP-MCNC: 3.5 G/DL
ALP SERPL-CCNC: 60 U/L
ALT SERPL W/O P-5'-P-CCNC: 9 U/L
ANION GAP SERPL CALC-SCNC: 5 MMOL/L
AST SERPL-CCNC: 13 U/L
BASOPHILS # BLD AUTO: 0.02 K/UL
BASOPHILS NFR BLD: 0.5 %
BILIRUB SERPL-MCNC: 1 MG/DL
BUN SERPL-MCNC: 20 MG/DL
CALCIUM SERPL-MCNC: 9.1 MG/DL
CHLORIDE SERPL-SCNC: 106 MMOL/L
CHOLEST SERPL-MCNC: 187 MG/DL
CHOLEST/HDLC SERPL: 3.5 {RATIO}
CO2 SERPL-SCNC: 30 MMOL/L
CREAT SERPL-MCNC: 1.1 MG/DL
CREAT UR-MCNC: 185.7 MG/DL
DIFFERENTIAL METHOD: ABNORMAL
EOSINOPHIL # BLD AUTO: 0.3 K/UL
EOSINOPHIL NFR BLD: 8.4 %
ERYTHROCYTE [DISTWIDTH] IN BLOOD BY AUTOMATED COUNT: 16.3 %
EST. GFR  (AFRICAN AMERICAN): 50 ML/MIN/1.73 M^2
EST. GFR  (NON AFRICAN AMERICAN): 44 ML/MIN/1.73 M^2
ESTIMATED AVG GLUCOSE: 126 MG/DL
GLUCOSE SERPL-MCNC: 103 MG/DL
HBA1C MFR BLD HPLC: 6 %
HCT VFR BLD AUTO: 36.9 %
HDLC SERPL-MCNC: 54 MG/DL
HDLC SERPL: 28.9 %
HGB BLD-MCNC: 11.7 G/DL
LDLC SERPL CALC-MCNC: 114.4 MG/DL
LYMPHOCYTES # BLD AUTO: 1.5 K/UL
LYMPHOCYTES NFR BLD: 40.3 %
MCH RBC QN AUTO: 27.8 PG
MCHC RBC AUTO-ENTMCNC: 31.7 G/DL
MCV RBC AUTO: 88 FL
MICROALBUMIN UR DL<=1MG/L-MCNC: 20 UG/ML
MICROALBUMIN/CREATININE RATIO: 10.8 UG/MG
MONOCYTES # BLD AUTO: 0.3 K/UL
MONOCYTES NFR BLD: 8.9 %
NEUTROPHILS # BLD AUTO: 1.6 K/UL
NEUTROPHILS NFR BLD: 41.9 %
NONHDLC SERPL-MCNC: 133 MG/DL
PLATELET # BLD AUTO: 196 K/UL
PMV BLD AUTO: 10.4 FL
POTASSIUM SERPL-SCNC: 4 MMOL/L
PROT SERPL-MCNC: 7 G/DL
RBC # BLD AUTO: 4.21 M/UL
SODIUM SERPL-SCNC: 141 MMOL/L
TRIGL SERPL-MCNC: 93 MG/DL
TSH SERPL DL<=0.005 MIU/L-ACNC: 0.8 UIU/ML
WBC # BLD AUTO: 3.8 K/UL

## 2018-07-10 PROCEDURE — 36415 COLL VENOUS BLD VENIPUNCTURE: CPT

## 2018-07-10 PROCEDURE — 84443 ASSAY THYROID STIM HORMONE: CPT

## 2018-07-10 PROCEDURE — 80061 LIPID PANEL: CPT

## 2018-07-10 PROCEDURE — 80053 COMPREHEN METABOLIC PANEL: CPT

## 2018-07-10 PROCEDURE — 82043 UR ALBUMIN QUANTITATIVE: CPT

## 2018-07-10 PROCEDURE — 85025 COMPLETE CBC W/AUTO DIFF WBC: CPT

## 2018-07-10 PROCEDURE — 83036 HEMOGLOBIN GLYCOSYLATED A1C: CPT

## 2018-07-12 ENCOUNTER — TELEPHONE (OUTPATIENT)
Dept: INTERNAL MEDICINE | Facility: CLINIC | Age: 83
End: 2018-07-12

## 2018-07-12 NOTE — TELEPHONE ENCOUNTER
----- Message from Corina Ansari sent at 2018  9:14 AM CDT -----  Contact: EDIL / ANTONETTE Marroquin  MRN: [id number]  : 1926  PCP: Liam Andrea  Home Phone      [phone]  Work Phone      Not on file.  Mobile          [phone]      MESSAGE: HAS QUESTIONS REGARDING PT'S MEDICATIONS. PLEASE CALL     PHONE: [phone]

## 2018-07-12 NOTE — TELEPHONE ENCOUNTER
Wanted to see if Dr hawthorne sent in something for acid reflux or if they had to get otc. Informed that dr hawthorne sent omeprazole to [facility].

## 2018-07-16 ENCOUNTER — OFFICE VISIT (OUTPATIENT)
Dept: INTERNAL MEDICINE | Facility: CLINIC | Age: 83
End: 2018-07-16
Payer: MEDICARE

## 2018-07-16 VITALS
DIASTOLIC BLOOD PRESSURE: 68 MMHG | HEIGHT: 64 IN | OXYGEN SATURATION: 96 % | HEART RATE: 62 BPM | BODY MASS INDEX: 27.4 KG/M2 | RESPIRATION RATE: 16 BRPM | WEIGHT: 160.5 LBS | SYSTOLIC BLOOD PRESSURE: 128 MMHG

## 2018-07-16 DIAGNOSIS — E11.21 TYPE 2 DIABETES WITH NEPHROPATHY: ICD-10-CM

## 2018-07-16 DIAGNOSIS — I10 ESSENTIAL HYPERTENSION: Primary | ICD-10-CM

## 2018-07-16 PROCEDURE — 99999 PR STA SHADOW: CPT | Mod: PBBFAC,,, | Performed by: INTERNAL MEDICINE

## 2018-07-16 PROCEDURE — 99213 OFFICE O/P EST LOW 20 MIN: CPT | Mod: PBBFAC | Performed by: INTERNAL MEDICINE

## 2018-07-16 PROCEDURE — 99999 PR PBB SHADOW E&M-EST. PATIENT-LVL III: CPT | Mod: PBBFAC,,, | Performed by: INTERNAL MEDICINE

## 2018-07-16 PROCEDURE — 99213 OFFICE O/P EST LOW 20 MIN: CPT | Mod: S$PBB | Performed by: INTERNAL MEDICINE

## 2018-07-16 RX ORDER — METOPROLOL TARTRATE 25 MG/1
25 TABLET, FILM COATED ORAL DAILY
Refills: 11 | COMMUNITY
Start: 2018-07-10 | End: 2021-02-16

## 2018-07-16 RX ORDER — AMLODIPINE BESYLATE 5 MG/1
10 TABLET ORAL DAILY
Refills: 11 | COMMUNITY
Start: 2018-07-10 | End: 2021-07-15 | Stop reason: CLARIF

## 2018-07-16 NOTE — PROGRESS NOTES
Subjective:       Patient ID: Roseanne Marroquin is a 89 y.o. female.    Chief Complaint: Hypertension (1 week new patient follow up); Diabetes; and Follow-up    HPI  Review of Systems    Objective:      Physical Exam   Constitutional: She is oriented to person, place, and time. She appears well-developed and well-nourished.   HENT:   Head: Normocephalic and atraumatic.   Cardiovascular: Normal rate, regular rhythm and normal heart sounds.    Pulses:       Dorsalis pedis pulses are 2+ on the right side, and 2+ on the left side.        Posterior tibial pulses are 2+ on the left side.   Pulmonary/Chest: Effort normal and breath sounds normal.   Abdominal: Soft. Bowel sounds are normal. There is no tenderness.   Musculoskeletal: She exhibits no edema.   +1 edema. bipedal   Feet:   Right Foot:   Protective Sensation: 7 sites tested. 7 sites sensed.   Skin Integrity: Negative for ulcer or erythema.   Left Foot:   Protective Sensation: 7 sites tested. 7 sites sensed.   Skin Integrity: Negative for ulcer, erythema or dry skin.   Neurological: She is alert and oriented to person, place, and time.   Skin: Skin is warm and dry.   Psychiatric: She has a normal mood and affect. Her behavior is normal. Judgment and thought content normal.   Nursing note and vitals reviewed.      Assessment:       1. Essential hypertension    2. Type 2 diabetes with nephropathy        Plan:   Roseanne was seen today for hypertension, diabetes and follow-up.    Diagnoses and all orders for this visit:    Essential hypertension  -     CBC auto differential; Future  -     Comprehensive metabolic panel; Future  -     Lipid panel; Future  -     TSH; Future    Well controlled.  Continue same medication and dose.  1. Keep weight close to ideal body weight.   2.   Avoid high salt foods (olives, pickles, smoked meats, salted potato chips, etc.).   Do not add salt to your food at the table.   Use only small amounts of salt when cooking.   3. Begin an exercise  program. Discuss with your doctor what type of exercise program would be best for you. It doesn't have to be difficult. Even brisk walking for 20 minutes three times a week is a good form of exercise.   4. Avoid medicines which contain heart stimulants. This includes many cold and sinus decongestant pills and sprays as well as diet pills. Check the warnings about hypertension on the label. Stimulants such as amphetamine or cocaine could be lethal for someone with hypertension. Never take these.    Type 2 diabetes with nephropathy  -     Hemoglobin A1c; Future  -     Microalbumin/creatinine urine ratio; Future  Patient has controlled Diabetes .  We discussed about diet ;low in calories. Avoid sweats, sodas.  Also increasing activity;walking 2-3 miles a day.   gave patient  instructions about adherence to plan.  Goal of  A1c  less than 7 % stressed.  Also goal of LDL less than 70 highlighted to patient.

## 2018-07-17 DIAGNOSIS — E11.21 DIABETES MELLITUS WITH NEPHROPATHY: Primary | ICD-10-CM

## 2018-07-17 NOTE — TELEPHONE ENCOUNTER
----- Message from Luz Elena Sterling sent at 2018 11:40 AM CDT -----  Contact: Stevo/Nick Marroquin  MRN: [id number]  : 1926  PCP: Liam Andrea  Home Phone      [phone]  Work Phone      Not on file.  Mobile          [phone]    MESSAGE:  Patient was seen yesterday, and she is having trouble filling the prescription for the glucose machine.  She has went to several pharmacies  but is having a hard time finding anyone who carries this.  Please call to advise.    Phone: [phone]

## 2018-07-17 NOTE — TELEPHONE ENCOUNTER
----- Message from Corina Ansari sent at 2018  2:18 PM CDT -----  Contact: EDIL / SON  Roseanne Marroquin  MRN: [id number]  : 1926  PCP: Liam Andrea  Home Phone      [phone]  Work Phone      Not on file.  Mobile          [phone]      MESSAGE: SAID THEY ARE WAITING ON A CALL BACK ABOUT GETTING PTS TEST STRIPS REFILLED, THAT THERE WAS TROUBLE FINDING THEM? PLEASE CALL     PHONE: [phone]

## 2018-07-18 RX ORDER — INSULIN PUMP SYRINGE, 3 ML
EACH MISCELLANEOUS
Qty: 1 EACH | Refills: 0 | Status: SHIPPED | OUTPATIENT
Start: 2018-07-18 | End: 2021-06-30

## 2018-07-18 RX ORDER — LANCETS
EACH MISCELLANEOUS
Qty: 100 EACH | Refills: 4 | Status: SHIPPED | OUTPATIENT
Start: 2018-07-18

## 2018-07-18 NOTE — TELEPHONE ENCOUNTER
Son states that patient needs diabetic test strips, but unable to get them for the brand meter that she is currently using. Orders pended for new diabetic testing supplies.  Requested Prescriptions     Pending Prescriptions Disp Refills    blood-glucose meter kit 1 each 0     Sig: To check BG 2 times daily, to use with insurance preferred meter    lancets Misc 100 each 4     Sig: To check BG 2 times daily, to use with insurance preferred meter    blood sugar diagnostic Strp 100 strip 4     Sig: To check BG 4 times daily, to use with insurance preferred meter

## 2018-08-20 NOTE — DISCHARGE SUMMARY
[facility] Medicine  Discharge Summary      Patient Name: Roseanne Marroquin  MRN: [id number]  Admission Date: 6/15/2018  Hospital Length of Stay: 0 days  Discharge Date and Time: 6/16/2018  7:32 PM  Attending Physician: No att. providers found   Discharging Provider: Joe Jiang MD  Primary Care Provider: Liam Andrea MD      HPI:   89 year old female presents to ED with h/o syncopal episode on her way to [facility] [place] yesterday evening.  Work up in ED reveals new onset Afib with NVR and UTI.  ROBERT score of 2. Started eliquis and placed on floor for telemetry monitoring and cipro started for UTI. She dropped into the low 30s overnight with several long pauses. Atropine was given once, 0.5mg.  She does c/o SOB at times but is currently not SOB, no CP, no dizziness.     * No surgery found *      Hospital Course:   No notes on file     Consults:     Sick sinus syndrome    Dropping into low 30s when asleep and has had syncopal episode. Needs a pacemaker. Will transfer to higher level of care           Heat syncope    I suspect her syncope was from bradycardia   Will work on transfer for cardiology eval and probable pacemaker           Acute cystitis with hematuria    U/a is not that impressive   Stop Cipro   Follow Cx             Final Active Diagnoses:    Diagnosis Date Noted POA    Sick sinus syndrome [I49.5] 06/16/2018 Yes    Acute cystitis with hematuria [N30.01] 06/15/2018 Yes    Heat syncope [T67.1XXA] 06/15/2018 Yes      Problems Resolved During this Admission:       Discharged Condition: stable    Disposition: Another Health Care Inst*    Follow Up:  Follow-up Information     Liam Andrea MD. Schedule an appointment as soon as possible for a visit in 3 days.    Specialty:  General Surgery  Why:  For reevaluation  Contact information:  [address] [place] [id number] [phone]                 Patient Instructions:   No discharge procedures on  file.    Significant Diagnostic Studies: Labs: All labs within the past 24 hours have been reviewed    Pending Diagnostic Studies:     None         Medications:  Reconciled Home Medications:      Medication List      ASK your doctor about these medications    amLODIPine 10 MG tablet  Commonly known as:  NORVASC  Take 1 tablet (10 mg total) by mouth once daily.     hydroCHLOROthiazide 25 MG tablet  Commonly known as:  HYDRODIURIL     hydrOXYzine HCl 25 MG tablet  Commonly known as:  ATARAX  Take 25 mg by mouth 3 (three) times daily as needed for Itching.     ketorolac 10 mg tablet  Commonly known as:  TORADOL  Take 1 tablet (10 mg total) by mouth every 6 (six) hours.     lisinopril 40 MG tablet  Commonly known as:  PRINIVIL,ZESTRIL  Take 40 mg by mouth once daily.     meclizine 12.5 mg tablet  Commonly known as:  ANTIVERT  Take 1 tablet (12.5 mg total) by mouth 3 (three) times daily as needed for Dizziness.     metFORMIN 500 MG tablet  Commonly known as:  GLUCOPHAGE            Indwelling Lines/Drains at time of discharge:   Lines/Drains/Airways          None          Time spent on the discharge of patient: 35 minutes  Patient was seen and examined on the date of discharge and determined to be suitable for discharge.    Critical care time spent on the evaluation and treatment of severe organ dysfunction, review of pertinent labs and imaging studies, discussions with consulting providers and discussions with patient/family: 35 minutes.     Joe Jiang MD  Department of Hospital Medicine  [facility]

## 2018-11-14 ENCOUNTER — HOSPITAL ENCOUNTER (EMERGENCY)
Facility: HOSPITAL | Age: 83
Discharge: HOME | End: 2018-11-14
Attending: SURGERY
Payer: MEDICARE

## 2018-11-14 VITALS
DIASTOLIC BLOOD PRESSURE: 80 MMHG | TEMPERATURE: 98 F | WEIGHT: 154.13 LBS | OXYGEN SATURATION: 98 % | HEART RATE: 70 BPM | BODY MASS INDEX: 26.45 KG/M2 | SYSTOLIC BLOOD PRESSURE: 160 MMHG | RESPIRATION RATE: 17 BRPM

## 2018-11-14 DIAGNOSIS — M54.2 NECK PAIN: Primary | ICD-10-CM

## 2018-11-14 LAB
ALBUMIN SERPL BCP-MCNC: 4.2 G/DL
ALP SERPL-CCNC: 70 U/L
ALT SERPL W/O P-5'-P-CCNC: 11 U/L
ANION GAP SERPL CALC-SCNC: 9 MMOL/L
AST SERPL-CCNC: 17 U/L
BASOPHILS # BLD AUTO: 0.02 K/UL
BASOPHILS NFR BLD: 0.3 %
BILIRUB SERPL-MCNC: 1.3 MG/DL
BUN SERPL-MCNC: 24 MG/DL
CALCIUM SERPL-MCNC: 9.8 MG/DL
CHLORIDE SERPL-SCNC: 104 MMOL/L
CK MB SERPL-MCNC: 1.7 NG/ML
CK MB SERPL-RTO: 0.9 %
CK SERPL-CCNC: 182 U/L
CK SERPL-CCNC: 182 U/L
CO2 SERPL-SCNC: 28 MMOL/L
CREAT SERPL-MCNC: 1.6 MG/DL
DIFFERENTIAL METHOD: ABNORMAL
EOSINOPHIL # BLD AUTO: 1 K/UL
EOSINOPHIL NFR BLD: 16.9 %
ERYTHROCYTE [DISTWIDTH] IN BLOOD BY AUTOMATED COUNT: 15.7 %
EST. GFR  (AFRICAN AMERICAN): 32 ML/MIN/1.73 M^2
EST. GFR  (NON AFRICAN AMERICAN): 28 ML/MIN/1.73 M^2
GLUCOSE SERPL-MCNC: 97 MG/DL
HCT VFR BLD AUTO: 43.3 %
HGB BLD-MCNC: 14 G/DL
LYMPHOCYTES # BLD AUTO: 2.3 K/UL
LYMPHOCYTES NFR BLD: 38.9 %
MCH RBC QN AUTO: 27.2 PG
MCHC RBC AUTO-ENTMCNC: 32.3 G/DL
MCV RBC AUTO: 84 FL
MONOCYTES # BLD AUTO: 0.4 K/UL
MONOCYTES NFR BLD: 7 %
NEUTROPHILS # BLD AUTO: 2.2 K/UL
NEUTROPHILS NFR BLD: 36.9 %
PLATELET # BLD AUTO: 225 K/UL
PMV BLD AUTO: 10 FL
POTASSIUM SERPL-SCNC: 4.7 MMOL/L
PROT SERPL-MCNC: 8.3 G/DL
RBC # BLD AUTO: 5.14 M/UL
SODIUM SERPL-SCNC: 141 MMOL/L
TROPONIN I SERPL DL<=0.01 NG/ML-MCNC: 0.01 NG/ML
WBC # BLD AUTO: 5.97 K/UL

## 2018-11-14 PROCEDURE — 63600175 PHARM REV CODE 636 W HCPCS: Performed by: NURSE PRACTITIONER

## 2018-11-14 PROCEDURE — 85025 COMPLETE CBC W/AUTO DIFF WBC: CPT

## 2018-11-14 PROCEDURE — 82553 CREATINE MB FRACTION: CPT

## 2018-11-14 PROCEDURE — 82550 ASSAY OF CK (CPK): CPT

## 2018-11-14 PROCEDURE — 80053 COMPREHEN METABOLIC PANEL: CPT

## 2018-11-14 PROCEDURE — 36415 COLL VENOUS BLD VENIPUNCTURE: CPT

## 2018-11-14 PROCEDURE — 84484 ASSAY OF TROPONIN QUANT: CPT

## 2018-11-14 PROCEDURE — 93005 ELECTROCARDIOGRAM TRACING: CPT

## 2018-11-14 PROCEDURE — 96372 THER/PROPH/DIAG INJ SC/IM: CPT

## 2018-11-14 PROCEDURE — 93010 ELECTROCARDIOGRAM REPORT: CPT | Mod: ,,, | Performed by: INTERNAL MEDICINE

## 2018-11-14 PROCEDURE — 25000003 PHARM REV CODE 250: Performed by: NURSE PRACTITIONER

## 2018-11-14 PROCEDURE — 99285 EMERGENCY DEPT VISIT HI MDM: CPT | Mod: 25

## 2018-11-14 RX ORDER — TRAMADOL HYDROCHLORIDE AND ACETAMINOPHEN 37.5; 325 MG/1; MG/1
1 TABLET, FILM COATED ORAL EVERY 6 HOURS PRN
Qty: 12 TABLET | Refills: 0 | Status: SHIPPED | OUTPATIENT
Start: 2018-11-14 | End: 2021-07-15 | Stop reason: CLARIF

## 2018-11-14 RX ORDER — KETOROLAC TROMETHAMINE 30 MG/ML
30 INJECTION, SOLUTION INTRAMUSCULAR; INTRAVENOUS
Status: COMPLETED | OUTPATIENT
Start: 2018-11-14 | End: 2018-11-14

## 2018-11-14 RX ORDER — MELOXICAM 7.5 MG/1
7.5 TABLET ORAL DAILY
Qty: 20 TABLET | Refills: 0 | Status: SHIPPED | OUTPATIENT
Start: 2018-11-14 | End: 2018-11-14 | Stop reason: CLARIF

## 2018-11-14 RX ORDER — METHOCARBAMOL 500 MG/1
500 TABLET, FILM COATED ORAL
Status: COMPLETED | OUTPATIENT
Start: 2018-11-14 | End: 2018-11-14

## 2018-11-14 RX ADMIN — METHOCARBAMOL TABLETS 500 MG: 500 TABLET, COATED ORAL at 01:11

## 2018-11-14 RX ADMIN — KETOROLAC TROMETHAMINE 30 MG: 30 INJECTION, SOLUTION INTRAMUSCULAR at 01:11

## 2018-11-14 NOTE — ED PROVIDER NOTES
Encounter Date: 11/14/2018       History     Chief Complaint   Patient presents with    Neck Pain     The history is provided by the patient.   Neck Pain    This is a recurrent problem. Illness onset: Three days ago. Associated with: Denies new injury or trauma, but reports a neck injury approximately 2 years ago. The pain is present in the generalized neck. The pain radiates to the left shoulder and right shoulder (Remains with full range of motion of bilateral shoulders). The pain is at a severity of 6/10. The symptoms are aggravated by bending and twisting. Pertinent negatives include no chest pain, no numbness, no headaches, no tingling and no weakness. She has tried nothing for the symptoms.     Review of patient's allergies indicates:  No Known Allergies  Past Medical History:   Diagnosis Date    Diabetes mellitus     Hypertension      Past Surgical History:   Procedure Laterality Date    CHOLECYSTECTOMY      HERNIA REPAIR       No family history on file.  Social History     Tobacco Use    Smoking status: Never Smoker    Smokeless tobacco: Never Used   Substance Use Topics    Alcohol use: No    Drug use: No     Review of Systems   Constitutional: Negative for chills, fatigue and fever.   HENT: Negative for congestion, dental problem, ear pain, rhinorrhea, sore throat and trouble swallowing.    Eyes: Negative for pain, discharge, redness and visual disturbance.   Respiratory: Negative for cough, shortness of breath and wheezing.    Cardiovascular: Negative for chest pain, palpitations and leg swelling.   Gastrointestinal: Negative for abdominal pain, constipation, diarrhea, nausea and vomiting.   Genitourinary: Negative for difficulty urinating, dysuria, flank pain, frequency, hematuria and urgency.   Musculoskeletal: Positive for neck pain. Negative for arthralgias, back pain and myalgias.   Skin: Negative for pallor, rash and wound.   Neurological: Negative for tingling, seizures, weakness, numbness  and headaches.   Psychiatric/Behavioral: Negative.        Physical Exam     Initial Vitals [11/14/18 1322]   BP Pulse Resp Temp SpO2   (!) 172/83 68 18 98.1 °F (36.7 °C) 96 %      MAP       --         Physical Exam    Nursing note and vitals reviewed.  Constitutional: No distress.   HENT:   Head: Normocephalic and atraumatic.   Right Ear: External ear normal.   Left Ear: External ear normal.   Nose: Nose normal.   Mouth/Throat: Oropharynx is clear and moist.   Eyes: Conjunctivae, EOM and lids are normal. Pupils are equal, round, and reactive to light.   Neck: Neck supple.   Cardiovascular: Normal rate, regular rhythm, S1 normal, S2 normal, normal heart sounds and intact distal pulses.   Pulmonary/Chest: Effort normal and breath sounds normal. No respiratory distress.   Abdominal: Soft. Bowel sounds are normal. There is no tenderness.   Musculoskeletal: Normal range of motion.        Right shoulder: Normal.        Left shoulder: Normal.        Cervical back: She exhibits tenderness. She exhibits normal range of motion, no swelling, no edema, no deformity, no laceration and normal pulse.   Neurological: She is alert and oriented to person, place, and time. She has normal strength. GCS eye subscore is 4. GCS verbal subscore is 5. GCS motor subscore is 6.   Skin: Skin is warm and dry. Capillary refill takes less than 2 seconds. No rash noted.   Psychiatric: She has a normal mood and affect. Her speech is normal and behavior is normal.         ED Course   Procedures  Labs Reviewed   CBC W/ AUTO DIFFERENTIAL - Abnormal; Notable for the following components:       Result Value    RDW 15.7 (*)     Eos # 1.0 (*)     Gran% 36.9 (*)     Eosinophil% 16.9 (*)     All other components within normal limits   COMPREHENSIVE METABOLIC PANEL - Abnormal; Notable for the following components:    Creatinine 1.6 (*)     Total Bilirubin 1.3 (*)     eGFR if  32 (*)     eGFR if non  28 (*)     All other  components within normal limits   CK-MB - Abnormal; Notable for the following components:     (*)     All other components within normal limits   CK - Abnormal; Notable for the following components:     (*)     All other components within normal limits   TROPONIN I     EKG Readings: (Independently Interpreted)   EKG read with MD at the time it was performed. EKG without concerning findings.        Imaging Results          X-Ray Cervical Spine AP And Lateral (Final result)  Result time 11/14/18 14:12:37    Final result by Desire Wilks MD (11/14/18 14:12:37)                 Impression:      As above.      Electronically signed by: Desire Wilks MD  Date:    11/14/2018  Time:    14:12             Narrative:    EXAMINATION:  XR CERVICAL SPINE AP LATERAL    CLINICAL HISTORY:  Cervicalgia    TECHNIQUE:  AP, lateral and open mouth views of the cervical spine were performed.    COMPARISON:  None.    FINDINGS:  There is straightening of the normal cervical lordosis.  Vertebral body heights are maintained.  There is disc space narrowing with endplate sclerosis at the C5-6 and C6-7 levels with associated marginal osteophytosis.  No fracture or subluxation is identified.  The prevertebral soft tissues appear normal.  Vascular calcifications are seen.                                     Medications   ketorolac injection 30 mg (30 mg Intramuscular Given 11/14/18 1356)   methocarbamol tablet 500 mg (500 mg Oral Given 11/14/18 1356)                         Clinical Impression:   The encounter diagnosis was Neck pain.      Disposition:   Disposition: Discharged  Condition: Stable    The patient acknowledges that close follow up with medical provider is required. Instructed to follow up with PCP within 2 days. Patient was given specific return precautions. The patient agrees to comply with all instruction and directions given in the ER.      Ultracet was prescribed at discharge.  was reviewed.                    Hayley Batista, NP  11/14/18 1450

## 2019-01-16 ENCOUNTER — OFFICE VISIT (OUTPATIENT)
Dept: INTERNAL MEDICINE | Facility: CLINIC | Age: 84
End: 2019-01-16
Payer: MEDICARE

## 2019-01-16 VITALS
OXYGEN SATURATION: 95 % | HEART RATE: 57 BPM | SYSTOLIC BLOOD PRESSURE: 124 MMHG | RESPIRATION RATE: 14 BRPM | DIASTOLIC BLOOD PRESSURE: 70 MMHG | WEIGHT: 157.19 LBS | HEIGHT: 64 IN | BODY MASS INDEX: 26.84 KG/M2

## 2019-01-16 DIAGNOSIS — K21.9 GASTROESOPHAGEAL REFLUX DISEASE WITHOUT ESOPHAGITIS: ICD-10-CM

## 2019-01-16 DIAGNOSIS — E11.21 TYPE 2 DIABETES WITH NEPHROPATHY: Primary | ICD-10-CM

## 2019-01-16 DIAGNOSIS — I10 ESSENTIAL HYPERTENSION: ICD-10-CM

## 2019-01-16 PROCEDURE — 99999 FLU VACCINE - HIGH DOSE (65+) PRESERVATIVE FREE IM: CPT | Mod: PBBFAC,,,

## 2019-01-16 PROCEDURE — 99999 PR STA SHADOW: CPT | Mod: PBBFAC,,, | Performed by: INTERNAL MEDICINE

## 2019-01-16 PROCEDURE — 99999 FLU VACCINE - HIGH DOSE (65+) PRESERVATIVE FREE IM: ICD-10-PCS | Mod: PBBFAC,,,

## 2019-01-16 PROCEDURE — 90662 IIV NO PRSV INCREASED AG IM: CPT | Mod: PBBFAC

## 2019-01-16 PROCEDURE — 99999 PR PBB SHADOW E&M-EST. PATIENT-LVL III: CPT | Mod: PBBFAC,,, | Performed by: INTERNAL MEDICINE

## 2019-01-16 PROCEDURE — 99213 OFFICE O/P EST LOW 20 MIN: CPT | Mod: PBBFAC | Performed by: INTERNAL MEDICINE

## 2019-01-16 PROCEDURE — 99213 OFFICE O/P EST LOW 20 MIN: CPT | Mod: S$PBB | Performed by: INTERNAL MEDICINE

## 2019-01-16 PROCEDURE — 99999 PR PBB SHADOW E&M-EST. PATIENT-LVL III: ICD-10-PCS | Mod: PBBFAC,,, | Performed by: INTERNAL MEDICINE

## 2019-01-16 NOTE — PROGRESS NOTES
Subjective:       Patient ID: Roseanne Marroquin is a 89 y.o. female.    Chief Complaint: Hypertension (follow up) and Diabetes    Roseanne Marroquin is a 89 y.o. female  For follow up.    She was seeing Dr Andrea.  Seen dr fofana in past for a fib.        Hypertension   This is a chronic problem. The current episode started more than 1 year ago. The problem has been resolved since onset. The problem is controlled. Pertinent negatives include no chest pain or palpitations. Past treatments include calcium channel blockers, beta blockers and ACE inhibitors. The current treatment provides significant improvement.   Diabetes   She presents for her follow-up diabetic visit. She has type 2 diabetes mellitus. Her disease course has been stable. Pertinent negatives for hypoglycemia include no confusion, dizziness, nervousness/anxiousness or pallor. Pertinent negatives for diabetes include no chest pain. Current diabetic treatment includes oral agent (monotherapy). An ACE inhibitor/angiotensin II receptor blocker is being taken.     Review of Systems   Constitutional: Negative for chills and fever.   HENT: Negative for congestion, hearing loss, sinus pressure and sore throat.    Eyes: Negative for photophobia.   Respiratory: Negative for cough, choking, chest tightness and wheezing.    Cardiovascular: Negative for chest pain and palpitations.   Gastrointestinal: Negative for blood in stool, nausea and vomiting.        Acidic stomach   Genitourinary: Negative for dysuria and hematuria.   Musculoskeletal: Negative for arthralgias and myalgias.   Skin: Negative for pallor.   Neurological: Negative for dizziness and numbness.   Hematological: Does not bruise/bleed easily.   Psychiatric/Behavioral: Negative for confusion and suicidal ideas. The patient is not nervous/anxious.        Objective:      Physical Exam   Constitutional: She is oriented to person, place, and time. She appears well-developed and well-nourished.   HENT:   Head:  Normocephalic and atraumatic.   Cardiovascular: Normal rate, regular rhythm and normal heart sounds.   Pulmonary/Chest: Effort normal and breath sounds normal.   Abdominal: Soft. Bowel sounds are normal. There is no tenderness.   Musculoskeletal: She exhibits no edema.   +1 edema. bipedal   Neurological: She is alert and oriented to person, place, and time.   Skin: Skin is warm and dry.   Psychiatric: She has a normal mood and affect. Her behavior is normal. Judgment and thought content normal.   Nursing note and vitals reviewed.      Assessment:       1. Type 2 diabetes with nephropathy    2. Essential hypertension    3. Gastroesophageal reflux disease without esophagitis        Plan:   Roseanne was seen today for hypertension and diabetes.    Diagnoses and all orders for this visit:    Type 2 diabetes with nephropathy  -     Microalbumin/creatinine urine ratio; Future  -     TSH; Future  -     Hemoglobin A1c; Future  Patient has controlled Diabetes .  We discussed about diet ;low in calories. Avoid sweats, sodas.  Also increasing activity;walking 2-3 miles a day.  I also adjusted medications and gave patient  instructions about adherence to plan.  Goal of  A1c  less than 7 % stressed.  Also goal of LDL less than 70 highlighted to patient.  Essential hypertension  -     CBC auto differential; Future  -     Comprehensive metabolic panel; Future  -     Lipid panel; Future    Well controlled.  Continue same medication and dose.  1. Keep weight close to ideal body weight.   2.   Avoid high salt foods (olives, pickles, smoked meats, salted potato chips, etc.).   Do not add salt to your food at the table.   Use only small amounts of salt when cooking.   3. Begin an exercise program. Discuss with your doctor what type of exercise program would be best for you. It doesn't have to be difficult. Even brisk walking for 20 minutes three times a week is a good form of exercise.   4. Avoid medicines which contain heart stimulants.  "This includes many cold and sinus decongestant pills and sprays as well as diet pills. Check the warnings about hypertension on the label. Stimulants such as amphetamine or cocaine could be lethal for someone with hypertension. Never take these.  Gastroesophageal reflux disease without esophagitis  Tips to Control Acid Reflux  To control acid reflux, youll need to make some basic diet and lifestyle changes. The simple steps outlined below may be all youll need to relieve discomfort.  · Avoid fatty foods and spicy foods.  · Eat fewer acidic foods, such as citrus and tomato-based foods. These can increase symptoms.  · Limit drinking alcohol, caffeine, and fizzy beverages. All increase acid reflux.  · Try limiting chocolate, peppermint, and spearmint. These can worsen acid reflux in some people.  Watch When You Eat  · Avoid lying down for 3 hours after eating.  · Do not snack before going to bed.  Raise Your Head    Raising your head and upper body by 4" to 6" helps limit reflux when youre lying down. Put blocks under the head of the bed frame to raise it.      Problem List Items Addressed This Visit     Type 2 diabetes with nephropathy - Primary    Relevant Orders    Microalbumin/creatinine urine ratio    TSH    Hemoglobin A1c    Essential hypertension    Relevant Orders    CBC auto differential    Comprehensive metabolic panel    Lipid panel    Gastroesophageal reflux disease without esophagitis        "

## 2019-02-12 DIAGNOSIS — K21.9 GASTROESOPHAGEAL REFLUX DISEASE WITHOUT ESOPHAGITIS: ICD-10-CM

## 2019-02-12 RX ORDER — OMEPRAZOLE 40 MG/1
40 CAPSULE, DELAYED RELEASE ORAL DAILY
Qty: 90 CAPSULE | Refills: 3 | Status: SHIPPED | OUTPATIENT
Start: 2019-02-12 | End: 2021-02-16 | Stop reason: DRUGHIGH

## 2019-02-12 NOTE — TELEPHONE ENCOUNTER
Requested Prescriptions     Pending Prescriptions Disp Refills    omeprazole (PRILOSEC) 40 MG capsule 90 capsule 3     Sig: Take 1 capsule (40 mg total) by mouth once daily.   LOV: 1/16/19

## 2019-04-12 ENCOUNTER — HOSPITAL ENCOUNTER (EMERGENCY)
Facility: HOSPITAL | Age: 84
Discharge: HOME | End: 2019-04-12
Attending: SURGERY
Payer: MEDICARE

## 2019-04-12 VITALS
RESPIRATION RATE: 17 BRPM | DIASTOLIC BLOOD PRESSURE: 74 MMHG | TEMPERATURE: 97 F | WEIGHT: 160 LBS | SYSTOLIC BLOOD PRESSURE: 166 MMHG | BODY MASS INDEX: 27.46 KG/M2 | HEART RATE: 63 BPM | OXYGEN SATURATION: 97 %

## 2019-04-12 DIAGNOSIS — I10 HYPERTENSION, UNSPECIFIED TYPE: ICD-10-CM

## 2019-04-12 DIAGNOSIS — R42 DIZZINESS: ICD-10-CM

## 2019-04-12 DIAGNOSIS — R42 VERTIGO: Primary | ICD-10-CM

## 2019-04-12 LAB
ALBUMIN SERPL BCP-MCNC: 4 G/DL (ref 3.5–5.2)
ALP SERPL-CCNC: 70 U/L (ref 55–135)
ALT SERPL W/O P-5'-P-CCNC: 11 U/L (ref 10–44)
ANION GAP SERPL CALC-SCNC: 8 MMOL/L (ref 8–16)
APTT BLDCRRT: 26.9 SEC (ref 21–32)
AST SERPL-CCNC: 15 U/L (ref 10–40)
BASOPHILS # BLD AUTO: 0.02 K/UL (ref 0–0.2)
BASOPHILS NFR BLD: 0.4 % (ref 0–1.9)
BILIRUB SERPL-MCNC: 1.1 MG/DL (ref 0.1–1)
BNP SERPL-MCNC: 144 PG/ML (ref 0–99)
BUN SERPL-MCNC: 19 MG/DL (ref 10–30)
CALCIUM SERPL-MCNC: 9.8 MG/DL (ref 8.7–10.5)
CHLORIDE SERPL-SCNC: 102 MMOL/L (ref 95–110)
CK MB SERPL-MCNC: 1.1 NG/ML (ref 0.1–6.5)
CK MB SERPL-MCNC: 1.3 NG/ML (ref 0.1–6.5)
CK MB SERPL-RTO: 0.7 % (ref 0–5)
CK MB SERPL-RTO: 0.8 % (ref 0–5)
CK SERPL-CCNC: 157 U/L (ref 20–180)
CK SERPL-CCNC: 157 U/L (ref 20–180)
CK SERPL-CCNC: 161 U/L (ref 20–180)
CK SERPL-CCNC: 161 U/L (ref 20–180)
CO2 SERPL-SCNC: 29 MMOL/L (ref 23–29)
CREAT SERPL-MCNC: 1.2 MG/DL (ref 0.5–1.4)
DIFFERENTIAL METHOD: ABNORMAL
EOSINOPHIL # BLD AUTO: 0.5 K/UL (ref 0–0.5)
EOSINOPHIL NFR BLD: 10.3 % (ref 0–8)
ERYTHROCYTE [DISTWIDTH] IN BLOOD BY AUTOMATED COUNT: 15.2 % (ref 11.5–14.5)
EST. GFR  (AFRICAN AMERICAN): 45 ML/MIN/1.73 M^2
EST. GFR  (NON AFRICAN AMERICAN): 39 ML/MIN/1.73 M^2
GLUCOSE SERPL-MCNC: 99 MG/DL (ref 70–110)
HCT VFR BLD AUTO: 42.1 % (ref 37–48.5)
HGB BLD-MCNC: 13.4 G/DL (ref 12–16)
INR PPP: 1 (ref 0.8–1.2)
LYMPHOCYTES # BLD AUTO: 2.1 K/UL (ref 1–4.8)
LYMPHOCYTES NFR BLD: 42.6 % (ref 18–48)
MAGNESIUM SERPL-MCNC: 2.5 MG/DL (ref 1.6–2.6)
MCH RBC QN AUTO: 27.9 PG (ref 27–31)
MCHC RBC AUTO-ENTMCNC: 31.8 G/DL (ref 32–36)
MCV RBC AUTO: 88 FL (ref 82–98)
MONOCYTES # BLD AUTO: 0.4 K/UL (ref 0.3–1)
MONOCYTES NFR BLD: 8.7 % (ref 4–15)
NEUTROPHILS # BLD AUTO: 1.9 K/UL (ref 1.8–7.7)
NEUTROPHILS NFR BLD: 38 % (ref 38–73)
PHOSPHATE SERPL-MCNC: 3.7 MG/DL (ref 2.7–4.5)
PLATELET # BLD AUTO: 190 K/UL (ref 150–350)
PMV BLD AUTO: 11.4 FL (ref 9.2–12.9)
POTASSIUM SERPL-SCNC: 4.2 MMOL/L (ref 3.5–5.1)
PROT SERPL-MCNC: 7.8 G/DL (ref 6–8.4)
PROTHROMBIN TIME: 10.6 SEC (ref 9–12.5)
RBC # BLD AUTO: 4.8 M/UL (ref 4–5.4)
SODIUM SERPL-SCNC: 139 MMOL/L (ref 136–145)
TROPONIN I SERPL DL<=0.01 NG/ML-MCNC: 0.01 NG/ML (ref 0–0.03)
TROPONIN I SERPL DL<=0.01 NG/ML-MCNC: <0.006 NG/ML (ref 0–0.03)
TSH SERPL DL<=0.005 MIU/L-ACNC: 0.75 UIU/ML (ref 0.4–4)
WBC # BLD AUTO: 4.95 K/UL (ref 3.9–12.7)

## 2019-04-12 PROCEDURE — 85025 COMPLETE CBC W/AUTO DIFF WBC: CPT

## 2019-04-12 PROCEDURE — 80053 COMPREHEN METABOLIC PANEL: CPT

## 2019-04-12 PROCEDURE — 93005 ELECTROCARDIOGRAM TRACING: CPT

## 2019-04-12 PROCEDURE — 83880 ASSAY OF NATRIURETIC PEPTIDE: CPT

## 2019-04-12 PROCEDURE — 93010 ELECTROCARDIOGRAM REPORT: CPT | Mod: 76,,, | Performed by: INTERNAL MEDICINE

## 2019-04-12 PROCEDURE — 36415 COLL VENOUS BLD VENIPUNCTURE: CPT

## 2019-04-12 PROCEDURE — 84100 ASSAY OF PHOSPHORUS: CPT

## 2019-04-12 PROCEDURE — 99285 EMERGENCY DEPT VISIT HI MDM: CPT | Mod: 25

## 2019-04-12 PROCEDURE — 82553 CREATINE MB FRACTION: CPT | Mod: 91

## 2019-04-12 PROCEDURE — 82550 ASSAY OF CK (CPK): CPT | Mod: 91

## 2019-04-12 PROCEDURE — 63600175 PHARM REV CODE 636 W HCPCS: Performed by: SURGERY

## 2019-04-12 PROCEDURE — 85730 THROMBOPLASTIN TIME PARTIAL: CPT

## 2019-04-12 PROCEDURE — 85610 PROTHROMBIN TIME: CPT

## 2019-04-12 PROCEDURE — 93010 EKG 12-LEAD: ICD-10-PCS | Mod: 76,,, | Performed by: INTERNAL MEDICINE

## 2019-04-12 PROCEDURE — 96374 THER/PROPH/DIAG INJ IV PUSH: CPT

## 2019-04-12 PROCEDURE — 84443 ASSAY THYROID STIM HORMONE: CPT

## 2019-04-12 PROCEDURE — 83735 ASSAY OF MAGNESIUM: CPT

## 2019-04-12 PROCEDURE — 84484 ASSAY OF TROPONIN QUANT: CPT | Mod: 91

## 2019-04-12 RX ORDER — MECLIZINE HCL 12.5 MG 12.5 MG/1
12.5 TABLET ORAL 3 TIMES DAILY PRN
Qty: 20 TABLET | Refills: 0 | Status: SHIPPED | OUTPATIENT
Start: 2019-04-12 | End: 2021-02-16

## 2019-04-12 RX ORDER — ONDANSETRON 4 MG/1
4 TABLET, ORALLY DISINTEGRATING ORAL EVERY 8 HOURS PRN
Qty: 20 TABLET | Refills: 0 | Status: SHIPPED | OUTPATIENT
Start: 2019-04-12 | End: 2021-04-30

## 2019-04-12 RX ORDER — HYDRALAZINE HYDROCHLORIDE 20 MG/ML
20 INJECTION INTRAMUSCULAR; INTRAVENOUS
Status: COMPLETED | OUTPATIENT
Start: 2019-04-12 | End: 2019-04-12

## 2019-04-12 RX ORDER — BENZONATATE 100 MG/1
200 CAPSULE ORAL 3 TIMES DAILY PRN
Qty: 20 CAPSULE | Refills: 0 | Status: SHIPPED | OUTPATIENT
Start: 2019-04-12 | End: 2019-04-22

## 2019-04-12 RX ORDER — AZITHROMYCIN 250 MG/1
TABLET, FILM COATED ORAL
Qty: 6 TABLET | Refills: 0 | Status: ON HOLD | OUTPATIENT
Start: 2019-04-12 | End: 2021-02-24

## 2019-04-12 RX ADMIN — HYDRALAZINE HYDROCHLORIDE 20 MG: 20 INJECTION INTRAMUSCULAR; INTRAVENOUS at 06:04

## 2019-04-12 NOTE — ED NOTES
"Patient reports that she was recently taken off of two of her HTN medications.  Patient denies CP.  Patient's only complaint is "heaviness in my head."   "

## 2019-04-12 NOTE — ED NOTES
Received verbal report from CLAUDIA Geller.  Pt in ED room ED 05/ED 05 lying in stretcher, HOB 30 degrees. Stretcher is in low, locked position, side rails up x2.  Pt currently in testing.

## 2019-04-13 NOTE — ED PROVIDER NOTES
[facility]                                                 Chief Complaint  89 y.o. female with Dizziness    History of Present Illness  Roseanne Marroquin presents to the emergency room with dizziness this evening  Pt states she suffers from vertigo, dizziness at home with no other complaint  Patient is alert and appropriate, normal neurologic exam in the ER tonight  Patient denies any chest pain shortness of breath, only dizziness with standing  Patient is not orthostatic, patient has a normal neuro exam, normal gait in the ER  Afebrile with good stable vital signs, states she has a history of atrial fibrillation    The history is provided by the patient   device was not used during this ER visit  Medical history: Diabetes and hypertension  Surgical history: Cholecystectomy and hernia repair  No Known Allergies   No family history on file.    Review of Systems and Physical Exam      Review of Systems  -- Constitution - no fever, denies fatigue, no weakness, no chills  -- Eyes - no tearing or redness, no visual disturbance  -- Ear, Nose - no tinnitus or earache, no nasal congestion or discharge  -- Mouth,Throat - no sore throat, no toothache, normal voice, normal swallowing  -- Respiratory - denies cough and congestion, no shortness of breath, no GAVIN  -- Cardiovascular - denies chest pain, no palpitations, denies claudication  -- Gastrointestinal - denies abdominal pain, nausea, vomiting, or diarrhea  -- Genitourinary - no dysuria, denies flank pain, no hematuria, no STD risk  -- Musculoskeletal - denies back pain, negative for trauma or injury  -- Neurological - dizziness, no headache, denies weakness or seizure; no LOC  -- Skin - denies pallor, rash, or changes in skin. no hives or welts noted  -- Psychiatric - Denies SI or HI, no psychosis or fractured thought noted     Vital Signs  Her oral temperature is 97.4 °F (36.3 °C).   Her blood pressure is 166/74 and her pulse is 63.    Her respiration is 17 and oxygen saturation is 97%.     Physical Exam  -- Nursing note and vitals reviewed  -- Constitutional: Appears well-developed and well-nourished  -- Head: Atraumatic. Normocephalic. No obvious abnormality  -- Eyes: Pupils are equal and reactive to light. Normal conjunctiva and lids  -- Neck: Normal range of motion. Neck supple. No masses, trachea midline  -- Cardiac: Normal rate, regular rhythm and normal heart sounds  -- Pulmonary: Normal respiratory effort, breath sounds clear to auscultation  -- Abdominal: Soft, no tenderness. Normal bowel sounds. Normal liver edge  -- Musculoskeletal: Normal range of motion, no effusions. Joints stable   -- Neurological: No focal deficits. Showed good interaction with staff  -- Vascular: Posterior tibial, dorsalis pedis and radial pulses 2+ bilaterally      Emergency Room Course      Lab Results     K 4.2      CO2 29   BUN 19   CREATININE 1.2   GLU 99   ALKPHOS 70   AST 15   ALT 11   BILITOT 1.1 (H)   ALBUMIN 4.0   PROT 7.8   WBC 4.95   HGB 13.4   HCT 42.1            CPKMB 1.3   TROPONINI <0.006   INR 1.0    (H)   MG 2.5   TSH 0.753     EKG  -- The EKG findings today were without concerning findings from baseline  -- The troponin drawn in the ER today was within normal limits  -- The 2nd troponin drawn in the ER today was within normal limits      Radiology  -- The CT of the head performed in the ER today was negative for acute pathology  -- Chest x-ray showed no infiltrate and showed no acute pathology  -- The US of the carotids performed in the ER today was negative for DVT     Medications Given  hydrALAZINE injection 20 mg (20 mg Intravenous Given 4/12/19 1822)     Medical Decision Making  -- Diagnosis management comments: 89 y.o. female with dizziness tonight  -- patient had a negative workup including a negative carotid ultrasound tonight  -- patient had an episode of atrial fib, has a long history of atrial  fibrillation noted  -- patient states she feels much better after Antivert given in the ER today  -- son at bedside states the patient needs a Z-Ricardo and cough medicine for cold  -- afebrile with good stable vital signs, normal neuro exam, negative workup     Diagnosis  -- Vertigo  -- Dizziness   -- Hypertension    Disposition and Plan  -- Disposition: home  -- Condition: stable  -- Follow-up: Patient to follow up with Gerson Delcid MD in 1-2 days.  -- I advised the patient that we have found no life threatening condition today  -- At this time, I believe the patient is clinically stable for discharge.   -- The patient acknowledges that close follow up with a MD is required   -- Patient agrees to comply with all instruction and direction given in the ER    This note is dictated on M*Modal word recognition program.  There are word recognition mistakes that are occasionally missed on review.         Fernando Baker MD  04/12/19 [id number]

## 2019-04-13 NOTE — ED NOTES
Pt given urine speciman cup, lyla soap towelettewipe, and instructions for [facility]; understanding verbalized

## 2019-05-01 ENCOUNTER — OFFICE VISIT (OUTPATIENT)
Dept: INTERNAL MEDICINE | Facility: CLINIC | Age: 84
End: 2019-05-01
Payer: MEDICARE

## 2019-05-01 ENCOUNTER — LAB VISIT (OUTPATIENT)
Dept: LAB | Facility: HOSPITAL | Age: 84
End: 2019-05-01
Attending: INTERNAL MEDICINE
Payer: MEDICARE

## 2019-05-01 VITALS
DIASTOLIC BLOOD PRESSURE: 64 MMHG | RESPIRATION RATE: 16 BRPM | HEART RATE: 54 BPM | WEIGHT: 157.88 LBS | BODY MASS INDEX: 26.95 KG/M2 | HEIGHT: 64 IN | SYSTOLIC BLOOD PRESSURE: 142 MMHG | OXYGEN SATURATION: 95 %

## 2019-05-01 DIAGNOSIS — K21.9 GASTROESOPHAGEAL REFLUX DISEASE WITHOUT ESOPHAGITIS: ICD-10-CM

## 2019-05-01 DIAGNOSIS — E11.21 TYPE 2 DIABETES WITH NEPHROPATHY: ICD-10-CM

## 2019-05-01 DIAGNOSIS — E11.21 TYPE 2 DIABETES WITH NEPHROPATHY: Primary | ICD-10-CM

## 2019-05-01 DIAGNOSIS — I10 ESSENTIAL HYPERTENSION: ICD-10-CM

## 2019-05-01 LAB
ALBUMIN SERPL BCP-MCNC: 3.8 G/DL (ref 3.5–5.2)
ALBUMIN/CREAT UR: 73 UG/MG (ref 0–30)
ALP SERPL-CCNC: 75 U/L (ref 55–135)
ALT SERPL W/O P-5'-P-CCNC: 11 U/L (ref 10–44)
ANION GAP SERPL CALC-SCNC: 6 MMOL/L (ref 8–16)
AST SERPL-CCNC: 17 U/L (ref 10–40)
BASOPHILS # BLD AUTO: 0.04 K/UL (ref 0–0.2)
BASOPHILS NFR BLD: 1 % (ref 0–1.9)
BILIRUB SERPL-MCNC: 0.7 MG/DL (ref 0.1–1)
BUN SERPL-MCNC: 21 MG/DL (ref 10–30)
CALCIUM SERPL-MCNC: 9.9 MG/DL (ref 8.7–10.5)
CHLORIDE SERPL-SCNC: 106 MMOL/L (ref 95–110)
CHOLEST SERPL-MCNC: 212 MG/DL (ref 120–199)
CHOLEST/HDLC SERPL: 3.7 {RATIO} (ref 2–5)
CO2 SERPL-SCNC: 29 MMOL/L (ref 23–29)
CREAT SERPL-MCNC: 1.2 MG/DL (ref 0.5–1.4)
CREAT UR-MCNC: 80.8 MG/DL (ref 15–325)
DIFFERENTIAL METHOD: ABNORMAL
EOSINOPHIL # BLD AUTO: 0.7 K/UL (ref 0–0.5)
EOSINOPHIL NFR BLD: 16.5 % (ref 0–8)
ERYTHROCYTE [DISTWIDTH] IN BLOOD BY AUTOMATED COUNT: 15.3 % (ref 11.5–14.5)
EST. GFR  (AFRICAN AMERICAN): 45 ML/MIN/1.73 M^2
EST. GFR  (NON AFRICAN AMERICAN): 39 ML/MIN/1.73 M^2
ESTIMATED AVG GLUCOSE: 126 MG/DL (ref 68–131)
GLUCOSE SERPL-MCNC: 101 MG/DL (ref 70–110)
HBA1C MFR BLD HPLC: 6 % (ref 4–5.6)
HCT VFR BLD AUTO: 41.3 % (ref 37–48.5)
HDLC SERPL-MCNC: 57 MG/DL (ref 40–75)
HDLC SERPL: 26.9 % (ref 20–50)
HGB BLD-MCNC: 13 G/DL (ref 12–16)
LDLC SERPL CALC-MCNC: 139.4 MG/DL (ref 63–159)
LYMPHOCYTES # BLD AUTO: 1.4 K/UL (ref 1–4.8)
LYMPHOCYTES NFR BLD: 34.6 % (ref 18–48)
MCH RBC QN AUTO: 27.5 PG (ref 27–31)
MCHC RBC AUTO-ENTMCNC: 31.5 G/DL (ref 32–36)
MCV RBC AUTO: 88 FL (ref 82–98)
MICROALBUMIN UR DL<=1MG/L-MCNC: 59 UG/ML
MONOCYTES # BLD AUTO: 0.3 K/UL (ref 0.3–1)
MONOCYTES NFR BLD: 6.4 % (ref 4–15)
NEUTROPHILS # BLD AUTO: 1.7 K/UL (ref 1.8–7.7)
NEUTROPHILS NFR BLD: 41.5 % (ref 38–73)
NONHDLC SERPL-MCNC: 155 MG/DL
PLATELET # BLD AUTO: 191 K/UL (ref 150–350)
PMV BLD AUTO: 10.3 FL (ref 9.2–12.9)
POTASSIUM SERPL-SCNC: 5 MMOL/L (ref 3.5–5.1)
PROT SERPL-MCNC: 7.6 G/DL (ref 6–8.4)
RBC # BLD AUTO: 4.72 M/UL (ref 4–5.4)
SODIUM SERPL-SCNC: 141 MMOL/L (ref 136–145)
TRIGL SERPL-MCNC: 78 MG/DL (ref 30–150)
TSH SERPL DL<=0.005 MIU/L-ACNC: 0.63 UIU/ML (ref 0.4–4)
WBC # BLD AUTO: 4.07 K/UL (ref 3.9–12.7)

## 2019-05-01 PROCEDURE — 80053 COMPREHEN METABOLIC PANEL: CPT

## 2019-05-01 PROCEDURE — 36415 COLL VENOUS BLD VENIPUNCTURE: CPT

## 2019-05-01 PROCEDURE — 84443 ASSAY THYROID STIM HORMONE: CPT

## 2019-05-01 PROCEDURE — 85025 COMPLETE CBC W/AUTO DIFF WBC: CPT

## 2019-05-01 PROCEDURE — 83036 HEMOGLOBIN GLYCOSYLATED A1C: CPT

## 2019-05-01 PROCEDURE — 80061 LIPID PANEL: CPT

## 2019-05-01 PROCEDURE — 99999 PR PBB SHADOW E&M-EST. PATIENT-LVL III: CPT | Mod: PBBFAC,,, | Performed by: INTERNAL MEDICINE

## 2019-05-01 PROCEDURE — 99999 PR STA SHADOW: CPT | Mod: PBBFAC,,, | Performed by: INTERNAL MEDICINE

## 2019-05-01 PROCEDURE — 99213 OFFICE O/P EST LOW 20 MIN: CPT | Mod: PBBFAC | Performed by: INTERNAL MEDICINE

## 2019-05-01 PROCEDURE — 99214 OFFICE O/P EST MOD 30 MIN: CPT | Mod: S$PBB | Performed by: INTERNAL MEDICINE

## 2019-05-01 PROCEDURE — 82043 UR ALBUMIN QUANTITATIVE: CPT

## 2019-05-01 PROCEDURE — 99999 PR PBB SHADOW E&M-EST. PATIENT-LVL III: ICD-10-PCS | Mod: PBBFAC,,, | Performed by: INTERNAL MEDICINE

## 2019-05-01 RX ORDER — HYDROCHLOROTHIAZIDE 25 MG/1
25 TABLET ORAL DAILY
Refills: 5 | COMMUNITY
Start: 2019-02-11 | End: 2021-02-16 | Stop reason: DRUGHIGH

## 2019-05-01 RX ORDER — LATANOPROST 50 UG/ML
1 SOLUTION/ DROPS OPHTHALMIC NIGHTLY
Refills: 4 | COMMUNITY
Start: 2019-02-28

## 2019-05-01 NOTE — PROGRESS NOTES
Subjective:       Patient ID: Roseanne Marroquin is a 89 y.o. female.    Chief Complaint: Diabetes (3 month f/u) and Hypertension    Roseanne Marroquin is a 89 y.o. female  For follow up.  NO LABS DONE     She was seeing Dr Andrea.  Seen dr fofana in past for a fib.      Hypertension   This is a chronic problem. The current episode started more than 1 year ago. The problem has been resolved since onset. The problem is controlled. Pertinent negatives include no chest pain or palpitations. Past treatments include calcium channel blockers, beta blockers and ACE inhibitors. The current treatment provides significant improvement.   Diabetes   She presents for her follow-up diabetic visit. She has type 2 diabetes mellitus. Her disease course has been stable. Pertinent negatives for hypoglycemia include no confusion, dizziness, nervousness/anxiousness or pallor. Pertinent negatives for diabetes include no chest pain. Current diabetic treatment includes oral agent (monotherapy). An ACE inhibitor/angiotensin II receptor blocker is being taken.     Review of Systems   Constitutional: Negative for chills and fever.   HENT: Negative for congestion, hearing loss, sinus pressure and sore throat.    Eyes: Negative for photophobia.   Respiratory: Negative for cough, choking, chest tightness and wheezing.    Cardiovascular: Negative for chest pain and palpitations.   Gastrointestinal: Negative for blood in stool, nausea and vomiting.        Acidic stomach   Genitourinary: Negative for dysuria and hematuria.   Musculoskeletal: Negative for arthralgias and myalgias.   Skin: Negative for pallor.   Neurological: Negative for dizziness and numbness.   Hematological: Does not bruise/bleed easily.   Psychiatric/Behavioral: Negative for confusion and suicidal ideas. The patient is not nervous/anxious.        Objective:      Physical Exam   Constitutional: She is oriented to person, place, and time. She appears well-developed and well-nourished.    HENT:   Head: Normocephalic and atraumatic.   Cardiovascular: Normal rate, regular rhythm and normal heart sounds.   Pulmonary/Chest: Effort normal and breath sounds normal.   Abdominal: Soft. Bowel sounds are normal. There is no tenderness.   Musculoskeletal: She exhibits no edema.   +1 edema. bipedal   Neurological: She is alert and oriented to person, place, and time.   Skin: Skin is warm and dry.   Psychiatric: She has a normal mood and affect. Her behavior is normal. Judgment and thought content normal.   Nursing note and vitals reviewed.      Assessment:       1. Type 2 diabetes with nephropathy    2. Essential hypertension    3. Gastroesophageal reflux disease without esophagitis        Plan:   Roseanne was seen today for diabetes and hypertension.    Diagnoses and all orders for this visit:    Type 2 diabetes with nephropathy  -     Lipid panel; Future  -     TSH; Future  -     Microalbumin/creatinine urine ratio; Future  -     Hemoglobin A1c; Future  Patient has controlled Diabetes .  We discussed about diet ;low in calories. Avoid sweats, sodas.  Also increasing activity;walking 2-3 miles a day.    Goal of  A1c  less than 7 % stressed.  Also goal of LDL less than 70 highlighted to patient.  Essential hypertension  -     CBC auto differential; Future  -     Comprehensive metabolic panel; Future    Well controlled.  Continue same medication and dose.  1. Keep weight close to ideal body weight.   2.   Avoid high salt foods (olives, pickles, smoked meats, salted potato chips, etc.).   Do not add salt to your food at the table.   Use only small amounts of salt when cooking.   3. Begin an exercise program. Discuss with your doctor what type of exercise program would be best for you. It doesn't have to be difficult. Even brisk walking for 20 minutes three times a week is a good form of exercise.   4. Avoid medicines which contain heart stimulants. This includes many cold and sinus decongestant pills and sprays as  "well as diet pills. Check the warnings about hypertension on the label. Stimulants such as amphetamine or cocaine could be lethal for someone with hypertension. Never take these.    Gastroesophageal reflux disease without esophagitis  -     Lipid panel; Future  -     TSH; Future  Tips to Control Acid Reflux  To control acid reflux, youll need to make some basic diet and lifestyle changes. The simple steps outlined below may be all youll need to relieve discomfort.  · Avoid fatty foods and spicy foods.  · Eat fewer acidic foods, such as citrus and tomato-based foods. These can increase symptoms.  · Limit drinking alcohol, caffeine, and fizzy beverages. All increase acid reflux.  · Try limiting chocolate, peppermint, and spearmint. These can worsen acid reflux in some people.  Watch When You Eat  · Avoid lying down for 3 hours after eating.  · Do not snack before going to bed.  Raise Your Head    Raising your head and upper body by 4" to 6" helps limit reflux when youre lying down. Put blocks under the head of the bed frame to raise it.      Problem List Items Addressed This Visit     None        "

## 2019-07-28 ENCOUNTER — LAB VISIT (OUTPATIENT)
Dept: LAB | Facility: HOSPITAL | Age: 84
End: 2019-07-28
Attending: INTERNAL MEDICINE
Payer: MEDICARE

## 2019-07-28 DIAGNOSIS — Z79.899 HIGH RISK MEDICATION USE: ICD-10-CM

## 2019-07-28 DIAGNOSIS — I48.91 ATRIAL FIBRILLATION: ICD-10-CM

## 2019-07-28 DIAGNOSIS — R06.09 DYSPNEA ON MINIMAL EXERTION: ICD-10-CM

## 2019-07-28 DIAGNOSIS — I10 HYPERTENSION: Primary | ICD-10-CM

## 2019-07-28 LAB
ALBUMIN SERPL BCP-MCNC: 3.8 G/DL (ref 3.5–5.2)
ALP SERPL-CCNC: 63 U/L (ref 55–135)
ALT SERPL W/O P-5'-P-CCNC: 11 U/L (ref 10–44)
ANION GAP SERPL CALC-SCNC: 8 MMOL/L (ref 8–16)
AST SERPL-CCNC: 16 U/L (ref 10–40)
BASOPHILS # BLD AUTO: 0.03 K/UL (ref 0–0.2)
BASOPHILS NFR BLD: 0.9 % (ref 0–1.9)
BILIRUB SERPL-MCNC: 0.9 MG/DL (ref 0.1–1)
BNP SERPL-MCNC: 136 PG/ML (ref 0–99)
BUN SERPL-MCNC: 22 MG/DL (ref 10–30)
CALCIUM SERPL-MCNC: 9.7 MG/DL (ref 8.7–10.5)
CHLORIDE SERPL-SCNC: 103 MMOL/L (ref 95–110)
CHOLEST SERPL-MCNC: 207 MG/DL (ref 120–199)
CHOLEST/HDLC SERPL: 3.8 {RATIO} (ref 2–5)
CO2 SERPL-SCNC: 30 MMOL/L (ref 23–29)
CREAT SERPL-MCNC: 1.2 MG/DL (ref 0.5–1.4)
DIFFERENTIAL METHOD: ABNORMAL
EOSINOPHIL # BLD AUTO: 0.3 K/UL (ref 0–0.5)
EOSINOPHIL NFR BLD: 9 % (ref 0–8)
ERYTHROCYTE [DISTWIDTH] IN BLOOD BY AUTOMATED COUNT: 15.3 % (ref 11.5–14.5)
EST. GFR  (AFRICAN AMERICAN): 45 ML/MIN/1.73 M^2
EST. GFR  (NON AFRICAN AMERICAN): 39 ML/MIN/1.73 M^2
GLUCOSE SERPL-MCNC: 106 MG/DL (ref 70–110)
HCT VFR BLD AUTO: 42.4 % (ref 37–48.5)
HDLC SERPL-MCNC: 55 MG/DL (ref 40–75)
HDLC SERPL: 26.6 % (ref 20–50)
HGB BLD-MCNC: 13.5 G/DL (ref 12–16)
IMM GRANULOCYTES # BLD AUTO: 0 K/UL (ref 0–0.04)
IMM GRANULOCYTES NFR BLD AUTO: 0 % (ref 0–0.5)
LDLC SERPL CALC-MCNC: 134.4 MG/DL (ref 63–159)
LYMPHOCYTES # BLD AUTO: 1.5 K/UL (ref 1–4.8)
LYMPHOCYTES NFR BLD: 45.8 % (ref 18–48)
MCH RBC QN AUTO: 26.5 PG (ref 27–31)
MCHC RBC AUTO-ENTMCNC: 31.8 G/DL (ref 32–36)
MCV RBC AUTO: 83 FL (ref 82–98)
MONOCYTES # BLD AUTO: 0.3 K/UL (ref 0.3–1)
MONOCYTES NFR BLD: 8.7 % (ref 4–15)
NEUTROPHILS # BLD AUTO: 1.2 K/UL (ref 1.8–7.7)
NEUTROPHILS NFR BLD: 35.6 % (ref 38–73)
NONHDLC SERPL-MCNC: 152 MG/DL
NRBC BLD-RTO: 0 /100 WBC
PLATELET # BLD AUTO: 183 K/UL (ref 150–350)
PMV BLD AUTO: 9.8 FL (ref 9.2–12.9)
POTASSIUM SERPL-SCNC: 4.1 MMOL/L (ref 3.5–5.1)
PROT SERPL-MCNC: 7.6 G/DL (ref 6–8.4)
RBC # BLD AUTO: 5.09 M/UL (ref 4–5.4)
SODIUM SERPL-SCNC: 141 MMOL/L (ref 136–145)
T3FREE SERPL-MCNC: 2.1 PG/ML (ref 2.3–4.2)
T4 FREE SERPL-MCNC: 0.66 NG/DL (ref 0.71–1.51)
TRIGL SERPL-MCNC: 88 MG/DL (ref 30–150)
TSH SERPL DL<=0.005 MIU/L-ACNC: 0.71 UIU/ML (ref 0.4–4)
WBC # BLD AUTO: 3.32 K/UL (ref 3.9–12.7)

## 2019-07-28 PROCEDURE — 84443 ASSAY THYROID STIM HORMONE: CPT

## 2019-07-28 PROCEDURE — 80061 LIPID PANEL: CPT

## 2019-07-28 PROCEDURE — 84439 ASSAY OF FREE THYROXINE: CPT

## 2019-07-28 PROCEDURE — 84481 FREE ASSAY (FT-3): CPT

## 2019-07-28 PROCEDURE — 85025 COMPLETE CBC W/AUTO DIFF WBC: CPT

## 2019-07-28 PROCEDURE — 36415 COLL VENOUS BLD VENIPUNCTURE: CPT

## 2019-07-28 PROCEDURE — 80053 COMPREHEN METABOLIC PANEL: CPT

## 2019-07-28 PROCEDURE — 83880 ASSAY OF NATRIURETIC PEPTIDE: CPT

## 2019-12-02 ENCOUNTER — OFFICE VISIT (OUTPATIENT)
Dept: INTERNAL MEDICINE | Facility: CLINIC | Age: 84
End: 2019-12-02
Payer: MEDICARE

## 2019-12-02 VITALS
HEIGHT: 64 IN | HEART RATE: 61 BPM | BODY MASS INDEX: 27.92 KG/M2 | WEIGHT: 163.56 LBS | RESPIRATION RATE: 16 BRPM | DIASTOLIC BLOOD PRESSURE: 76 MMHG | OXYGEN SATURATION: 98 % | SYSTOLIC BLOOD PRESSURE: 132 MMHG

## 2019-12-02 DIAGNOSIS — E78.2 MIXED HYPERLIPIDEMIA: ICD-10-CM

## 2019-12-02 DIAGNOSIS — E11.21 TYPE 2 DIABETES WITH NEPHROPATHY: ICD-10-CM

## 2019-12-02 DIAGNOSIS — I10 ESSENTIAL HYPERTENSION: Primary | ICD-10-CM

## 2019-12-02 PROCEDURE — 99999 FLU VACCINE - HIGH DOSE (65+) PRESERVATIVE FREE IM: CPT | Mod: PBBFAC,,,

## 2019-12-02 PROCEDURE — G0008 ADMIN INFLUENZA VIRUS VAC: HCPCS | Mod: PBBFAC

## 2019-12-02 PROCEDURE — 99999 PR PBB SHADOW E&M-EST. PATIENT-LVL III: ICD-10-PCS | Mod: PBBFAC,,, | Performed by: INTERNAL MEDICINE

## 2019-12-02 PROCEDURE — 99213 OFFICE O/P EST LOW 20 MIN: CPT | Mod: PBBFAC | Performed by: INTERNAL MEDICINE

## 2019-12-02 PROCEDURE — 99999 FLU VACCINE - HIGH DOSE (65+) PRESERVATIVE FREE IM: ICD-10-PCS | Mod: PBBFAC,,,

## 2019-12-02 PROCEDURE — 90662 IIV NO PRSV INCREASED AG IM: CPT | Mod: PBBFAC

## 2019-12-02 PROCEDURE — 99214 OFFICE O/P EST MOD 30 MIN: CPT | Mod: S$PBB | Performed by: INTERNAL MEDICINE

## 2019-12-02 PROCEDURE — 99999 PNEUMOCOCCAL POLYSACCHARIDE VACCINE 23-VALENT =>2YO SQ IM: CPT | Mod: PBBFAC,,,

## 2019-12-02 PROCEDURE — 99999 PR STA SHADOW: CPT | Mod: PBBFAC,,, | Performed by: INTERNAL MEDICINE

## 2019-12-02 PROCEDURE — 99999 PR PBB SHADOW E&M-EST. PATIENT-LVL III: CPT | Mod: PBBFAC,,, | Performed by: INTERNAL MEDICINE

## 2019-12-02 RX ORDER — NAPROXEN SODIUM 220 MG/1
81 TABLET, FILM COATED ORAL DAILY
COMMUNITY

## 2019-12-02 RX ORDER — ATORVASTATIN CALCIUM 10 MG/1
TABLET, FILM COATED ORAL
COMMUNITY
Start: 2019-11-29 | End: 2021-02-16 | Stop reason: DRUGHIGH

## 2019-12-02 RX ORDER — AMLODIPINE BESYLATE 10 MG/1
10 TABLET ORAL DAILY
Refills: 11 | COMMUNITY
Start: 2019-10-07 | End: 2019-12-02 | Stop reason: SDUPTHER

## 2019-12-02 NOTE — PROGRESS NOTES
Subjective:       Patient ID: Roseanne Marroquin is a 89 y.o. female.    Chief Complaint: Follow-up; Hypertension; Hyperlipidemia; and Diabetes    Roseanne Marroquin is a 89 y.o. female  For follow up.  NO LABS DONE     She was seeing Dr Andrea.  Seen dr fofana in past for a fib.      Hypertension   This is a chronic problem. The current episode started more than 1 year ago. The problem has been resolved since onset. The problem is controlled. Pertinent negatives include no chest pain or palpitations. Past treatments include calcium channel blockers, beta blockers and ACE inhibitors. The current treatment provides significant improvement.   Diabetes   She presents for her follow-up diabetic visit. She has type 2 diabetes mellitus. Her disease course has been stable. Pertinent negatives for hypoglycemia include no confusion, dizziness, nervousness/anxiousness or pallor. Pertinent negatives for diabetes include no chest pain. Current diabetic treatment includes oral agent (monotherapy). An ACE inhibitor/angiotensin II receptor blocker is being taken.     Review of Systems   Constitutional: Negative for chills and fever.   HENT: Negative for congestion, hearing loss, sinus pressure and sore throat.    Eyes: Negative for photophobia.   Respiratory: Negative for cough, choking, chest tightness and wheezing.    Cardiovascular: Negative for chest pain and palpitations.   Gastrointestinal: Negative for blood in stool, nausea and vomiting.        Acidic stomach   Genitourinary: Negative for dysuria and hematuria.   Musculoskeletal: Negative for arthralgias and myalgias.   Skin: Negative for pallor.   Neurological: Negative for dizziness and numbness.   Hematological: Does not bruise/bleed easily.   Psychiatric/Behavioral: Negative for confusion and suicidal ideas. The patient is not nervous/anxious.        Objective:      Physical Exam   Constitutional: She is oriented to person, place, and time. She appears well-developed and  well-nourished.   HENT:   Head: Normocephalic and atraumatic.   Cardiovascular: Normal rate, regular rhythm and normal heart sounds.   Pulses:       Dorsalis pedis pulses are 2+ on the left side.        Posterior tibial pulses are 2+ on the left side.   Pulmonary/Chest: Effort normal and breath sounds normal.   Abdominal: Soft. Bowel sounds are normal. There is no tenderness.   Musculoskeletal: She exhibits no edema.   +1 edema. bipedal   Feet:   Right Foot:   Protective Sensation: 7 sites tested. 5 sites sensed.   Skin Integrity: Negative for ulcer, erythema or dry skin.   Left Foot:   Protective Sensation: 7 sites tested.   Skin Integrity: Negative for ulcer, erythema or dry skin.   Neurological: She is alert and oriented to person, place, and time.   Skin: Skin is warm and dry.   Psychiatric: She has a normal mood and affect. Her behavior is normal. Judgment and thought content normal.   Nursing note and vitals reviewed.      Assessment:       1. Essential hypertension    2. Type 2 diabetes with nephropathy    3. Mixed hyperlipidemia        Plan:   Roseanne was seen today for follow-up, hypertension, hyperlipidemia and diabetes.    Diagnoses and all orders for this visit:    Essential hypertension  -     CBC auto differential; Future  -     Comprehensive metabolic panel; Future  -     Lipid panel; Future  -     TSH; Future  -     CBC auto differential; Future  -     Comprehensive metabolic panel; Future  -     TSH; Future  -     Lipid panel; Future    Well controlled.  Continue same medication and dose.  1. Keep weight close to ideal body weight.   2.   Avoid high salt foods (olives, pickles, smoked meats, salted potato chips, etc.).   Do not add salt to your food at the table.   Use only small amounts of salt when cooking.   3. Begin an exercise program. Discuss with your doctor what type of exercise program would be best for you. It doesn't have to be difficult. Even brisk walking for 20 minutes three times a  week is a good form of exercise.   4. Avoid medicines which contain heart stimulants. This includes many cold and sinus decongestant pills and sprays as well as diet pills. Check the warnings about hypertension on the label. Stimulants such as amphetamine or cocaine could be lethal for someone with hypertension. Never take these.    Type 2 diabetes with nephropathy  -     Microalbumin/creatinine urine ratio; Future  -     Hemoglobin A1c; Future  -     Hemoglobin A1c; Future  -     Lipid panel; Future  Patient has controlled Diabetes .  We discussed about diet ;low in calories. Avoid sweats, sodas.  Also increasing activity;walking 2-3 miles a day.  I also adjusted medications and gave patient  instructions about adherence to plan.  Goal of  A1c  less than 7 % stressed.  Also goal of LDL less than 70 highlighted to patient.  Mixed hyperlipidemia  -     Lipid panel; Future  -     Lipid panel; Future  Lab Results   Component Value Date    LDLCALC 134.4 07/28/2019     Continue lipitor      Problem List Items Addressed This Visit     Type 2 diabetes with nephropathy    Essential hypertension - Primary    Mixed hyperlipidemia

## 2020-03-19 DIAGNOSIS — E11.21 DIABETES MELLITUS WITH NEPHROPATHY: ICD-10-CM

## 2020-03-19 NOTE — TELEPHONE ENCOUNTER
----- Message from Abby Quintana sent at 3/19/2020  2:57 PM CDT -----  Contact: Stevo (grand son)  Roseanne Marroquin  MRN: [id number]  : 1926  PCP: Gerson Delcid  Home Phone      [phone]  Work Phone      Not on file.  Mobile          [phone]    MESSAGE:   Rx Alonso Meter Glucometer test strips     Phone # [phone]    Pharmacy - [facility], [address]

## 2020-04-21 ENCOUNTER — HOSPITAL ENCOUNTER (EMERGENCY)
Facility: HOSPITAL | Age: 85
Discharge: HOME | End: 2020-04-21
Attending: SURGERY
Payer: MEDICARE

## 2020-04-21 VITALS
HEART RATE: 78 BPM | OXYGEN SATURATION: 97 % | SYSTOLIC BLOOD PRESSURE: 172 MMHG | WEIGHT: 163 LBS | TEMPERATURE: 99 F | RESPIRATION RATE: 16 BRPM | DIASTOLIC BLOOD PRESSURE: 76 MMHG | BODY MASS INDEX: 27.98 KG/M2

## 2020-04-21 DIAGNOSIS — S00.03XA CONTUSION OF SCALP, INITIAL ENCOUNTER: Primary | ICD-10-CM

## 2020-04-21 PROCEDURE — 25000003 PHARM REV CODE 250: Performed by: SURGERY

## 2020-04-21 PROCEDURE — 99284 EMERGENCY DEPT VISIT MOD MDM: CPT | Mod: 25

## 2020-04-21 RX ORDER — ACETAMINOPHEN 325 MG/1
650 TABLET ORAL
Status: COMPLETED | OUTPATIENT
Start: 2020-04-21 | End: 2020-04-21

## 2020-04-21 RX ADMIN — ACETAMINOPHEN 650 MG: 325 TABLET ORAL at 12:04

## 2020-04-21 NOTE — ED TRIAGE NOTES
Stated she fell and struck the lect occipital area of her head approx 1-2 hours pta.  No neuro deficits noted.

## 2020-06-08 ENCOUNTER — LAB VISIT (OUTPATIENT)
Dept: LAB | Facility: HOSPITAL | Age: 85
End: 2020-06-08
Attending: INTERNAL MEDICINE
Payer: MEDICARE

## 2020-06-08 DIAGNOSIS — I10 ESSENTIAL HYPERTENSION: ICD-10-CM

## 2020-06-08 DIAGNOSIS — E11.21 TYPE 2 DIABETES WITH NEPHROPATHY: ICD-10-CM

## 2020-06-08 DIAGNOSIS — E78.2 MIXED HYPERLIPIDEMIA: ICD-10-CM

## 2020-06-08 LAB
ALBUMIN SERPL BCP-MCNC: 3.4 G/DL (ref 3.5–5.2)
ALBUMIN/CREAT UR: 49.5 UG/MG (ref 0–30)
ALP SERPL-CCNC: 59 U/L (ref 55–135)
ALT SERPL W/O P-5'-P-CCNC: 13 U/L (ref 10–44)
ANION GAP SERPL CALC-SCNC: 9 MMOL/L (ref 8–16)
AST SERPL-CCNC: 17 U/L (ref 10–40)
BASOPHILS # BLD AUTO: 0.04 K/UL (ref 0–0.2)
BASOPHILS NFR BLD: 0.8 % (ref 0–1.9)
BILIRUB SERPL-MCNC: 1.4 MG/DL (ref 0.1–1)
BUN SERPL-MCNC: 20 MG/DL (ref 10–30)
CALCIUM SERPL-MCNC: 9.1 MG/DL (ref 8.7–10.5)
CHLORIDE SERPL-SCNC: 104 MMOL/L (ref 95–110)
CHOLEST SERPL-MCNC: 184 MG/DL (ref 120–199)
CHOLEST/HDLC SERPL: 3.3 {RATIO} (ref 2–5)
CO2 SERPL-SCNC: 29 MMOL/L (ref 23–29)
CREAT SERPL-MCNC: 1.1 MG/DL (ref 0.5–1.4)
CREAT UR-MCNC: 109 MG/DL (ref 15–325)
DIFFERENTIAL METHOD: ABNORMAL
EOSINOPHIL # BLD AUTO: 0.6 K/UL (ref 0–0.5)
EOSINOPHIL NFR BLD: 11.9 % (ref 0–8)
ERYTHROCYTE [DISTWIDTH] IN BLOOD BY AUTOMATED COUNT: 16.3 % (ref 11.5–14.5)
EST. GFR  (AFRICAN AMERICAN): 50 ML/MIN/1.73 M^2
EST. GFR  (NON AFRICAN AMERICAN): 43 ML/MIN/1.73 M^2
ESTIMATED AVG GLUCOSE: 128 MG/DL (ref 68–131)
GLUCOSE SERPL-MCNC: 127 MG/DL (ref 70–110)
HBA1C MFR BLD HPLC: 6.1 % (ref 4–5.6)
HCT VFR BLD AUTO: 41.6 % (ref 37–48.5)
HDLC SERPL-MCNC: 55 MG/DL (ref 40–75)
HDLC SERPL: 29.9 % (ref 20–50)
HGB BLD-MCNC: 13.1 G/DL (ref 12–16)
IMM GRANULOCYTES # BLD AUTO: 0.02 K/UL (ref 0–0.04)
IMM GRANULOCYTES NFR BLD AUTO: 0.4 % (ref 0–0.5)
LDLC SERPL CALC-MCNC: 108.6 MG/DL (ref 63–159)
LYMPHOCYTES # BLD AUTO: 1.4 K/UL (ref 1–4.8)
LYMPHOCYTES NFR BLD: 29.3 % (ref 18–48)
MCH RBC QN AUTO: 28 PG (ref 27–31)
MCHC RBC AUTO-ENTMCNC: 31.5 G/DL (ref 32–36)
MCV RBC AUTO: 89 FL (ref 82–98)
MICROALBUMIN UR DL<=1MG/L-MCNC: 54 UG/ML
MONOCYTES # BLD AUTO: 0.4 K/UL (ref 0.3–1)
MONOCYTES NFR BLD: 8.8 % (ref 4–15)
NEUTROPHILS # BLD AUTO: 2.3 K/UL (ref 1.8–7.7)
NEUTROPHILS NFR BLD: 48.8 % (ref 38–73)
NONHDLC SERPL-MCNC: 129 MG/DL
NRBC BLD-RTO: 0 /100 WBC
PLATELET # BLD AUTO: 192 K/UL (ref 150–350)
PMV BLD AUTO: 10.5 FL (ref 9.2–12.9)
POTASSIUM SERPL-SCNC: 3.8 MMOL/L (ref 3.5–5.1)
PROT SERPL-MCNC: 7.4 G/DL (ref 6–8.4)
RBC # BLD AUTO: 4.68 M/UL (ref 4–5.4)
SODIUM SERPL-SCNC: 142 MMOL/L (ref 136–145)
TRIGL SERPL-MCNC: 102 MG/DL (ref 30–150)
TSH SERPL DL<=0.005 MIU/L-ACNC: 0.47 UIU/ML (ref 0.4–4)
WBC # BLD AUTO: 4.78 K/UL (ref 3.9–12.7)

## 2020-06-08 PROCEDURE — 80061 LIPID PANEL: CPT

## 2020-06-08 PROCEDURE — 36415 COLL VENOUS BLD VENIPUNCTURE: CPT

## 2020-06-08 PROCEDURE — 84443 ASSAY THYROID STIM HORMONE: CPT

## 2020-06-08 PROCEDURE — 80053 COMPREHEN METABOLIC PANEL: CPT

## 2020-06-08 PROCEDURE — 82043 UR ALBUMIN QUANTITATIVE: CPT

## 2020-06-08 PROCEDURE — 83036 HEMOGLOBIN GLYCOSYLATED A1C: CPT

## 2020-06-08 PROCEDURE — 85025 COMPLETE CBC W/AUTO DIFF WBC: CPT

## 2020-07-01 ENCOUNTER — HOSPITAL ENCOUNTER (EMERGENCY)
Facility: HOSPITAL | Age: 85
Discharge: HOME | End: 2020-07-01
Attending: SURGERY
Payer: MEDICARE

## 2020-07-01 VITALS
BODY MASS INDEX: 27.34 KG/M2 | SYSTOLIC BLOOD PRESSURE: 156 MMHG | HEART RATE: 62 BPM | RESPIRATION RATE: 19 BRPM | DIASTOLIC BLOOD PRESSURE: 82 MMHG | WEIGHT: 159.31 LBS | TEMPERATURE: 98 F | OXYGEN SATURATION: 96 %

## 2020-07-01 DIAGNOSIS — I50.9 CHRONIC CONGESTIVE HEART FAILURE, UNSPECIFIED HEART FAILURE TYPE: Primary | ICD-10-CM

## 2020-07-01 DIAGNOSIS — I48.91 ATRIAL FIBRILLATION: ICD-10-CM

## 2020-07-01 LAB
ALBUMIN SERPL BCP-MCNC: 4 G/DL (ref 3.5–5.2)
ALP SERPL-CCNC: 65 U/L (ref 55–135)
ALT SERPL W/O P-5'-P-CCNC: 12 U/L (ref 10–44)
ANION GAP SERPL CALC-SCNC: 13 MMOL/L (ref 8–16)
AST SERPL-CCNC: 20 U/L (ref 10–40)
BASOPHILS # BLD AUTO: 0.03 K/UL (ref 0–0.2)
BASOPHILS NFR BLD: 0.5 % (ref 0–1.9)
BILIRUB SERPL-MCNC: 1.2 MG/DL (ref 0.1–1)
BNP SERPL-MCNC: 124 PG/ML (ref 0–99)
BUN SERPL-MCNC: 26 MG/DL (ref 10–30)
CALCIUM SERPL-MCNC: 10.1 MG/DL (ref 8.7–10.5)
CHLORIDE SERPL-SCNC: 101 MMOL/L (ref 95–110)
CK MB SERPL-MCNC: 1.4 NG/ML (ref 0.1–6.5)
CK MB SERPL-RTO: 0.8 % (ref 0–5)
CK SERPL-CCNC: 166 U/L (ref 20–180)
CO2 SERPL-SCNC: 28 MMOL/L (ref 23–29)
CREAT SERPL-MCNC: 1.5 MG/DL (ref 0.5–1.4)
DIFFERENTIAL METHOD: ABNORMAL
EOSINOPHIL # BLD AUTO: 0.7 K/UL (ref 0–0.5)
EOSINOPHIL NFR BLD: 13 % (ref 0–8)
ERYTHROCYTE [DISTWIDTH] IN BLOOD BY AUTOMATED COUNT: 15.4 % (ref 11.5–14.5)
EST. GFR  (AFRICAN AMERICAN): 34 ML/MIN/1.73 M^2
EST. GFR  (NON AFRICAN AMERICAN): 30 ML/MIN/1.73 M^2
GLUCOSE SERPL-MCNC: 148 MG/DL (ref 70–110)
HCT VFR BLD AUTO: 44.3 % (ref 37–48.5)
HGB BLD-MCNC: 14 G/DL (ref 12–16)
IMM GRANULOCYTES # BLD AUTO: 0.04 K/UL (ref 0–0.04)
IMM GRANULOCYTES NFR BLD AUTO: 0.7 % (ref 0–0.5)
LYMPHOCYTES # BLD AUTO: 2.1 K/UL (ref 1–4.8)
LYMPHOCYTES NFR BLD: 38.7 % (ref 18–48)
MCH RBC QN AUTO: 27.9 PG (ref 27–31)
MCHC RBC AUTO-ENTMCNC: 31.6 G/DL (ref 32–36)
MCV RBC AUTO: 88 FL (ref 82–98)
MONOCYTES # BLD AUTO: 0.4 K/UL (ref 0.3–1)
MONOCYTES NFR BLD: 7.6 % (ref 4–15)
NEUTROPHILS # BLD AUTO: 2.2 K/UL (ref 1.8–7.7)
NEUTROPHILS NFR BLD: 39.5 % (ref 38–73)
NRBC BLD-RTO: 0 /100 WBC
PLATELET # BLD AUTO: 197 K/UL (ref 150–350)
PMV BLD AUTO: 9.7 FL (ref 9.2–12.9)
POTASSIUM SERPL-SCNC: 4 MMOL/L (ref 3.5–5.1)
PROT SERPL-MCNC: 8.1 G/DL (ref 6–8.4)
RBC # BLD AUTO: 5.01 M/UL (ref 4–5.4)
SODIUM SERPL-SCNC: 142 MMOL/L (ref 136–145)
TROPONIN I SERPL DL<=0.01 NG/ML-MCNC: 0.02 NG/ML (ref 0–0.03)
WBC # BLD AUTO: 5.53 K/UL (ref 3.9–12.7)

## 2020-07-01 PROCEDURE — 82553 CREATINE MB FRACTION: CPT

## 2020-07-01 PROCEDURE — 82550 ASSAY OF CK (CPK): CPT

## 2020-07-01 PROCEDURE — 36415 COLL VENOUS BLD VENIPUNCTURE: CPT

## 2020-07-01 PROCEDURE — 25000003 PHARM REV CODE 250: Performed by: SURGERY

## 2020-07-01 PROCEDURE — 84484 ASSAY OF TROPONIN QUANT: CPT

## 2020-07-01 PROCEDURE — 93010 EKG 12-LEAD: ICD-10-PCS | Mod: ,,, | Performed by: INTERNAL MEDICINE

## 2020-07-01 PROCEDURE — 93010 ELECTROCARDIOGRAM REPORT: CPT | Mod: ,,, | Performed by: INTERNAL MEDICINE

## 2020-07-01 PROCEDURE — 85025 COMPLETE CBC W/AUTO DIFF WBC: CPT

## 2020-07-01 PROCEDURE — 83880 ASSAY OF NATRIURETIC PEPTIDE: CPT

## 2020-07-01 PROCEDURE — 80053 COMPREHEN METABOLIC PANEL: CPT

## 2020-07-01 PROCEDURE — 93005 ELECTROCARDIOGRAM TRACING: CPT

## 2020-07-01 PROCEDURE — 99285 EMERGENCY DEPT VISIT HI MDM: CPT | Mod: 25

## 2020-07-01 RX ORDER — FUROSEMIDE 20 MG/1
20 TABLET ORAL DAILY
Qty: 10 TABLET | Refills: 0 | OUTPATIENT
Start: 2020-07-01 | End: 2020-09-16

## 2020-07-01 RX ORDER — FUROSEMIDE 40 MG/1
40 TABLET ORAL
Status: COMPLETED | OUTPATIENT
Start: 2020-07-01 | End: 2020-07-01

## 2020-07-01 RX ADMIN — FUROSEMIDE 40 MG: 40 TABLET ORAL at 04:07

## 2020-07-01 NOTE — ED PROVIDER NOTES
Encounter Date: 7/1/2020       History     Chief Complaint   Patient presents with    Shortness of Breath     for a few days now.      Patient is 89-year-old black female who reports shortness of breath and coughing for 3-4 days now.  She has a history of atrial fibrillation.  Nonsmoker.  She also has a history of diabetes mellitus.        Review of patient's allergies indicates:  No Known Allergies  Past Medical History:   Diagnosis Date    Diabetes mellitus     Hypertension     Vertigo      Past Surgical History:   Procedure Laterality Date    CHOLECYSTECTOMY      HERNIA REPAIR       No family history on file.  Social History     Tobacco Use    Smoking status: Never Smoker    Smokeless tobacco: Never Used   Substance Use Topics    Alcohol use: No    Drug use: No     Review of Systems   Constitutional: Negative for fever.   HENT: Negative for congestion, ear pain, rhinorrhea, sore throat and trouble swallowing.    Eyes: Negative for pain.   Respiratory: Positive for cough and shortness of breath. Negative for wheezing.    Cardiovascular: Negative for chest pain, palpitations and leg swelling.   Gastrointestinal: Negative for abdominal pain, constipation, diarrhea and nausea.   Genitourinary: Negative for difficulty urinating, dysuria, flank pain, frequency, hematuria and urgency.   Musculoskeletal: Negative for arthralgias, back pain, myalgias and neck pain.   Skin: Negative for rash and wound.   Neurological: Negative for speech difficulty, weakness and headaches.   Hematological: Does not bruise/bleed easily.       Physical Exam     Initial Vitals [07/01/20 1404]   BP Pulse Resp Temp SpO2   (!) 191/88 68 18 97.5 °F (36.4 °C) 98 %      MAP       --         Physical Exam    Nursing note and vitals reviewed.  Constitutional: She appears well-developed and well-nourished.   HENT:   Head: Normocephalic and atraumatic.   Neck: Normal range of motion. Neck supple.   Pulmonary/Chest: Breath sounds normal. No  respiratory distress.   Abdominal: She exhibits no distension. There is no abdominal tenderness.   Neurological: She is alert and oriented to person, place, and time.   Psychiatric: She has a normal mood and affect. Thought content normal.         ED Course   Procedures  Labs Reviewed - No data to display       Imaging Results    None                                          Clinical Impression:       ICD-10-CM ICD-9-CM   1. Chronic congestive heart failure, unspecified heart failure type  I50.9 428.0   2. Atrial fibrillation  I48.91 427.31         Disposition:   Disposition: Discharged  Condition: Stable                        Panda Berumen Jr., MD  07/01/20 4519

## 2020-07-15 DIAGNOSIS — Z71.89 COMPLEX CARE COORDINATION: ICD-10-CM

## 2020-08-11 ENCOUNTER — HOSPITAL ENCOUNTER (EMERGENCY)
Facility: HOSPITAL | Age: 85
Discharge: HOME | End: 2020-08-11
Attending: SURGERY
Payer: MEDICARE

## 2020-08-11 VITALS
OXYGEN SATURATION: 96 % | HEART RATE: 58 BPM | RESPIRATION RATE: 16 BRPM | DIASTOLIC BLOOD PRESSURE: 77 MMHG | SYSTOLIC BLOOD PRESSURE: 165 MMHG | TEMPERATURE: 98 F

## 2020-08-11 DIAGNOSIS — M79.673 FOOT PAIN: ICD-10-CM

## 2020-08-11 DIAGNOSIS — M10.9 ACUTE GOUT OF FOOT, UNSPECIFIED CAUSE, UNSPECIFIED LATERALITY: Primary | ICD-10-CM

## 2020-08-11 DIAGNOSIS — M79.89 LEG SWELLING: ICD-10-CM

## 2020-08-11 LAB
ALBUMIN SERPL BCP-MCNC: 3.8 G/DL (ref 3.5–5.2)
ALP SERPL-CCNC: 64 U/L (ref 55–135)
ALT SERPL W/O P-5'-P-CCNC: 16 U/L (ref 10–44)
ANION GAP SERPL CALC-SCNC: 9 MMOL/L (ref 8–16)
AST SERPL-CCNC: 20 U/L (ref 10–40)
BACTERIA #/AREA URNS HPF: ABNORMAL /HPF
BASOPHILS # BLD AUTO: 0.02 K/UL (ref 0–0.2)
BASOPHILS NFR BLD: 0.4 % (ref 0–1.9)
BILIRUB SERPL-MCNC: 0.8 MG/DL (ref 0.1–1)
BILIRUB UR QL STRIP: NEGATIVE
BNP SERPL-MCNC: 58 PG/ML (ref 0–99)
BUN SERPL-MCNC: 23 MG/DL (ref 10–30)
CALCIUM SERPL-MCNC: 9.3 MG/DL (ref 8.7–10.5)
CHLORIDE SERPL-SCNC: 103 MMOL/L (ref 95–110)
CK MB SERPL-MCNC: 1.1 NG/ML (ref 0.1–6.5)
CK MB SERPL-RTO: 0.4 % (ref 0–5)
CK SERPL-CCNC: 276 U/L (ref 20–180)
CK SERPL-CCNC: 276 U/L (ref 20–180)
CLARITY UR: ABNORMAL
CO2 SERPL-SCNC: 28 MMOL/L (ref 23–29)
COLOR UR: YELLOW
CREAT SERPL-MCNC: 1.2 MG/DL (ref 0.5–1.4)
DIFFERENTIAL METHOD: ABNORMAL
EOSINOPHIL # BLD AUTO: 0.5 K/UL (ref 0–0.5)
EOSINOPHIL NFR BLD: 10.4 % (ref 0–8)
ERYTHROCYTE [DISTWIDTH] IN BLOOD BY AUTOMATED COUNT: 15.5 % (ref 11.5–14.5)
EST. GFR  (AFRICAN AMERICAN): 45 ML/MIN/1.73 M^2
EST. GFR  (NON AFRICAN AMERICAN): 39 ML/MIN/1.73 M^2
GLUCOSE SERPL-MCNC: 112 MG/DL (ref 70–110)
GLUCOSE UR QL STRIP: NEGATIVE
HCT VFR BLD AUTO: 41.2 % (ref 37–48.5)
HGB BLD-MCNC: 13 G/DL (ref 12–16)
HGB UR QL STRIP: ABNORMAL
IMM GRANULOCYTES # BLD AUTO: 0.02 K/UL (ref 0–0.04)
IMM GRANULOCYTES NFR BLD AUTO: 0.4 % (ref 0–0.5)
KETONES UR QL STRIP: NEGATIVE
LEUKOCYTE ESTERASE UR QL STRIP: ABNORMAL
LYMPHOCYTES # BLD AUTO: 1.9 K/UL (ref 1–4.8)
LYMPHOCYTES NFR BLD: 37.1 % (ref 18–48)
MAGNESIUM SERPL-MCNC: 2.3 MG/DL (ref 1.6–2.6)
MCH RBC QN AUTO: 28 PG (ref 27–31)
MCHC RBC AUTO-ENTMCNC: 31.6 G/DL (ref 32–36)
MCV RBC AUTO: 89 FL (ref 82–98)
MICROSCOPIC COMMENT: ABNORMAL
MONOCYTES # BLD AUTO: 0.4 K/UL (ref 0.3–1)
MONOCYTES NFR BLD: 7.5 % (ref 4–15)
NEUTROPHILS # BLD AUTO: 2.3 K/UL (ref 1.8–7.7)
NEUTROPHILS NFR BLD: 44.2 % (ref 38–73)
NITRITE UR QL STRIP: NEGATIVE
NRBC BLD-RTO: 0 /100 WBC
PH UR STRIP: 6 [PH] (ref 5–8)
PHOSPHATE SERPL-MCNC: 3.8 MG/DL (ref 2.7–4.5)
PLATELET # BLD AUTO: 213 K/UL (ref 150–350)
PMV BLD AUTO: 10 FL (ref 9.2–12.9)
POTASSIUM SERPL-SCNC: 4.5 MMOL/L (ref 3.5–5.1)
PROT SERPL-MCNC: 7.8 G/DL (ref 6–8.4)
PROT UR QL STRIP: NEGATIVE
RBC # BLD AUTO: 4.65 M/UL (ref 4–5.4)
RBC #/AREA URNS HPF: 12 /HPF (ref 0–4)
SODIUM SERPL-SCNC: 140 MMOL/L (ref 136–145)
SP GR UR STRIP: 1.02 (ref 1–1.03)
SQUAMOUS #/AREA URNS HPF: 6 /HPF
TROPONIN I SERPL DL<=0.01 NG/ML-MCNC: <0.006 NG/ML (ref 0–0.03)
TSH SERPL DL<=0.005 MIU/L-ACNC: 1.03 UIU/ML (ref 0.4–4)
URATE SERPL-MCNC: 8.9 MG/DL (ref 2.4–5.7)
URN SPEC COLLECT METH UR: ABNORMAL
UROBILINOGEN UR STRIP-ACNC: NEGATIVE EU/DL
WBC # BLD AUTO: 5.2 K/UL (ref 3.9–12.7)
WBC #/AREA URNS HPF: 32 /HPF (ref 0–5)
YEAST URNS QL MICRO: ABNORMAL

## 2020-08-11 PROCEDURE — 81000 URINALYSIS NONAUTO W/SCOPE: CPT

## 2020-08-11 PROCEDURE — 93010 ELECTROCARDIOGRAM REPORT: CPT | Mod: ,,, | Performed by: INTERNAL MEDICINE

## 2020-08-11 PROCEDURE — 85025 COMPLETE CBC W/AUTO DIFF WBC: CPT

## 2020-08-11 PROCEDURE — 36415 COLL VENOUS BLD VENIPUNCTURE: CPT

## 2020-08-11 PROCEDURE — 25000003 PHARM REV CODE 250: Performed by: SURGERY

## 2020-08-11 PROCEDURE — 87086 URINE CULTURE/COLONY COUNT: CPT

## 2020-08-11 PROCEDURE — 82553 CREATINE MB FRACTION: CPT

## 2020-08-11 PROCEDURE — 99285 EMERGENCY DEPT VISIT HI MDM: CPT | Mod: 25

## 2020-08-11 PROCEDURE — 83735 ASSAY OF MAGNESIUM: CPT

## 2020-08-11 PROCEDURE — 84550 ASSAY OF BLOOD/URIC ACID: CPT

## 2020-08-11 PROCEDURE — 87147 CULTURE TYPE IMMUNOLOGIC: CPT

## 2020-08-11 PROCEDURE — 84100 ASSAY OF PHOSPHORUS: CPT

## 2020-08-11 PROCEDURE — 87088 URINE BACTERIA CULTURE: CPT

## 2020-08-11 PROCEDURE — 93010 EKG 12-LEAD: ICD-10-PCS | Mod: ,,, | Performed by: INTERNAL MEDICINE

## 2020-08-11 PROCEDURE — 83880 ASSAY OF NATRIURETIC PEPTIDE: CPT

## 2020-08-11 PROCEDURE — 84484 ASSAY OF TROPONIN QUANT: CPT

## 2020-08-11 PROCEDURE — 84443 ASSAY THYROID STIM HORMONE: CPT

## 2020-08-11 PROCEDURE — 93005 ELECTROCARDIOGRAM TRACING: CPT

## 2020-08-11 PROCEDURE — 82550 ASSAY OF CK (CPK): CPT

## 2020-08-11 PROCEDURE — 80053 COMPREHEN METABOLIC PANEL: CPT

## 2020-08-11 RX ORDER — TRAMADOL HYDROCHLORIDE 50 MG/1
50 TABLET ORAL
Status: COMPLETED | OUTPATIENT
Start: 2020-08-11 | End: 2020-08-11

## 2020-08-11 RX ORDER — TRAMADOL HYDROCHLORIDE 50 MG/1
50 TABLET ORAL EVERY 6 HOURS PRN
Qty: 7 TABLET | Refills: 0 | Status: SHIPPED | OUTPATIENT
Start: 2020-08-11 | End: 2020-08-13

## 2020-08-11 RX ADMIN — TRAMADOL HYDROCHLORIDE 50 MG: 50 TABLET, FILM COATED ORAL at 09:08

## 2020-08-12 NOTE — ED NOTES
Notified Dr. Baker of patients urine result.  Discharge instructions given to patient. Discussed medications prescribed and when next time due. Discussed follow up with family doctor. Patient voiced understanding.

## 2020-08-12 NOTE — ED PROVIDER NOTES
[facility]                                                 Chief Complaint  89 y.o. female with feet swelling      History of Present Illness  Roseanne Marroquin presents to the emergency room with bilateral foot pain  Patient with longstanding bilateral foot pain, denies any trauma today  Patient states her feet swell right greater than left with pain afterwards  Patient on exam has no obvious gross deformity, denies any injury now  Neurovascular intact, presentation suspicious for gout in the ER now    The history is provided by the patient   device was not used during this ER visit  Medical history: Diabetes, hypertension, vertigo  Surgeries: Gallbladder and hernia  No known allergy    I have reviewed all of this patient's past medical, surgical, family, and social   histories as well as active allergies and medications documented in the  electronic medical record    Review of Systems and Physical Exam      Review of Systems  -- Constitution - no fever, denies fatigue, no weakness, no chills  -- Eyes - no tearing or redness, no visual disturbance  -- Ear, Nose - no tinnitus or earache, no nasal congestion or discharge  -- Mouth,Throat - no sore throat, no toothache, normal voice, normal swallowing  -- Respiratory - denies cough and congestion, no shortness of breath, no GAVIN  -- Cardiovascular - denies chest pain, no palpitations, denies claudication  -- Gastrointestinal - denies abdominal pain, nausea, vomiting, or diarrhea  -- Genitourinary - no dysuria, denies flank pain, no hematuria, no STD risk  -- Musculoskeletal - bilateral foot swelling  -- Neurological - no headache, denies weakness or seizure; no LOC  -- Skin - denies pallor, rash, or changes in skin. no hives or welts noted    Vital Signs  Her blood pressure is 128/68 and her pulse is 67.   Her respiration is 18 and oxygen saturation is 96%.     Physical Exam  -- Nursing note and vitals reviewed  -- Constitutional:  Appears well-developed and well-nourished  -- Head: Atraumatic. Normocephalic. No obvious abnormality  -- Eyes: Pupils are equal and reactive to light. Normal conjunctiva and lids  -- Cardiac: Normal rate, regular rhythm and normal heart sounds  -- Pulmonary: Normal respiratory effort, breath sounds clear to auscultation  -- Abdominal: Soft, no tenderness. Normal bowel sounds. Normal liver edge  -- Musculoskeletal: Normal range of motion, no effusions. Joints stable   -- Neurological: No focal deficits. Showed good interaction with staff  -- Vascular: Posterior tibial, dorsalis pedis and radial pulses 2+ bilaterally      Emergency Room Course      Lab Results     K 4.5      CO2 28   BUN 23   CREATININE 1.2    (H)   ALKPHOS 64   AST 20   ALT 16   BILITOT 0.8   ALBUMIN 3.8   PROT 7.8   WBC 5.20   HGB 13.0   HCT 41.2       (H)    (H)   CPKMB 1.1   TROPONINI <0.006   BNP 58   MG 2.3   TSH 1.027     EKG  -- The EKG findings today were without concerning findings from baseline     Radiology  -- Chest x-ray showed no infiltrate and showed no acute pathology  -- ED foot x-rays readings showed no evidence of fracture or dislocation  -- All x-rays are reviewed with a final disposition given by the radiologist     Medications Given  traMADoL tablet 50 mg (has no administration in time range)     ED Physician Management  -- Diagnosis management comments: 89 y.o. female with foot pain   -- bilateral foot pain, denies any trauma fall on interview this evening  -- patient elevated uric acid with symptoms consistent with gout  -- counseled extensively and gout diet and close follow-up with PCP  -- patient extensively counseled on follow-up for official gout DX  -- return to the ER with any concerning symptoms after discharge    Diagnosis  [M79.89] Leg swelling  [M79.673] Foot pain  [M10.9] Acute gout of foot    Disposition and Plan  -- Disposition: home  -- Condition: stable  -- Follow-up:  Patient to follow up with Gerson Delcid MD in 1-2 days.  -- I advised the patient that we have found no life threatening condition today  -- At this time, I believe the patient is clinically stable for discharge.   -- The patient acknowledges that close follow up with a MD is required   -- Patient agrees to comply with all instruction and direction given in the ER    This note is dictated on M*Modal word recognition program.  There are word recognition mistakes that are occasionally missed on review.         Fernando Baker MD  08/11/20 9742

## 2020-08-12 NOTE — ED NOTES
Patient placed on continuous cardiac monitor, automatic blood pressure cuff and continuous pulse oximeter.Sinus bradycardia noted per monitor.

## 2020-08-13 LAB — BACTERIA UR CULT: ABNORMAL

## 2020-09-16 ENCOUNTER — HOSPITAL ENCOUNTER (EMERGENCY)
Facility: HOSPITAL | Age: 85
Discharge: HOME | End: 2020-09-16
Attending: SURGERY
Payer: MEDICARE

## 2020-09-16 VITALS
SYSTOLIC BLOOD PRESSURE: 167 MMHG | TEMPERATURE: 98 F | HEART RATE: 65 BPM | WEIGHT: 165.56 LBS | OXYGEN SATURATION: 95 % | DIASTOLIC BLOOD PRESSURE: 75 MMHG | HEIGHT: 64 IN | BODY MASS INDEX: 28.27 KG/M2 | RESPIRATION RATE: 18 BRPM

## 2020-09-16 DIAGNOSIS — R06.00 DYSPNEA: ICD-10-CM

## 2020-09-16 DIAGNOSIS — I50.42 CHRONIC COMBINED SYSTOLIC AND DIASTOLIC CONGESTIVE HEART FAILURE: Primary | ICD-10-CM

## 2020-09-16 LAB
ALBUMIN SERPL BCP-MCNC: 3.8 G/DL (ref 3.5–5.2)
ALP SERPL-CCNC: 63 U/L (ref 55–135)
ALT SERPL W/O P-5'-P-CCNC: 12 U/L (ref 10–44)
ANION GAP SERPL CALC-SCNC: 11 MMOL/L (ref 8–16)
AST SERPL-CCNC: 16 U/L (ref 10–40)
BASOPHILS # BLD AUTO: 0.03 K/UL (ref 0–0.2)
BASOPHILS NFR BLD: 0.6 % (ref 0–1.9)
BILIRUB SERPL-MCNC: 0.8 MG/DL (ref 0.1–1)
BNP SERPL-MCNC: 219 PG/ML (ref 0–99)
BUN SERPL-MCNC: 17 MG/DL (ref 10–30)
CALCIUM SERPL-MCNC: 8.9 MG/DL (ref 8.7–10.5)
CHLORIDE SERPL-SCNC: 106 MMOL/L (ref 95–110)
CO2 SERPL-SCNC: 24 MMOL/L (ref 23–29)
CREAT SERPL-MCNC: 1.5 MG/DL (ref 0.5–1.4)
DIFFERENTIAL METHOD: ABNORMAL
EOSINOPHIL # BLD AUTO: 0.5 K/UL (ref 0–0.5)
EOSINOPHIL NFR BLD: 10.3 % (ref 0–8)
ERYTHROCYTE [DISTWIDTH] IN BLOOD BY AUTOMATED COUNT: 15.1 % (ref 11.5–14.5)
EST. GFR  (AFRICAN AMERICAN): 34 ML/MIN/1.73 M^2
EST. GFR  (NON AFRICAN AMERICAN): 30 ML/MIN/1.73 M^2
GLUCOSE SERPL-MCNC: 163 MG/DL (ref 70–110)
HCT VFR BLD AUTO: 40.8 % (ref 37–48.5)
HGB BLD-MCNC: 13.1 G/DL (ref 12–16)
IMM GRANULOCYTES # BLD AUTO: 0.03 K/UL (ref 0–0.04)
IMM GRANULOCYTES NFR BLD AUTO: 0.6 % (ref 0–0.5)
LYMPHOCYTES # BLD AUTO: 1.7 K/UL (ref 1–4.8)
LYMPHOCYTES NFR BLD: 33.1 % (ref 18–48)
MCH RBC QN AUTO: 28.2 PG (ref 27–31)
MCHC RBC AUTO-ENTMCNC: 32.1 G/DL (ref 32–36)
MCV RBC AUTO: 88 FL (ref 82–98)
MONOCYTES # BLD AUTO: 0.4 K/UL (ref 0.3–1)
MONOCYTES NFR BLD: 7.3 % (ref 4–15)
NEUTROPHILS # BLD AUTO: 2.4 K/UL (ref 1.8–7.7)
NEUTROPHILS NFR BLD: 48.1 % (ref 38–73)
NRBC BLD-RTO: 0 /100 WBC
PLATELET # BLD AUTO: 196 K/UL (ref 150–350)
PMV BLD AUTO: 9.7 FL (ref 9.2–12.9)
POTASSIUM SERPL-SCNC: 4 MMOL/L (ref 3.5–5.1)
PROT SERPL-MCNC: 7.9 G/DL (ref 6–8.4)
RBC # BLD AUTO: 4.64 M/UL (ref 4–5.4)
SODIUM SERPL-SCNC: 141 MMOL/L (ref 136–145)
TROPONIN I SERPL DL<=0.01 NG/ML-MCNC: 0.01 NG/ML (ref 0–0.03)
WBC # BLD AUTO: 5.04 K/UL (ref 3.9–12.7)

## 2020-09-16 PROCEDURE — 93010 EKG 12-LEAD: ICD-10-PCS | Mod: ,,, | Performed by: INTERNAL MEDICINE

## 2020-09-16 PROCEDURE — 85025 COMPLETE CBC W/AUTO DIFF WBC: CPT

## 2020-09-16 PROCEDURE — 84484 ASSAY OF TROPONIN QUANT: CPT

## 2020-09-16 PROCEDURE — 99285 EMERGENCY DEPT VISIT HI MDM: CPT | Mod: 25

## 2020-09-16 PROCEDURE — 25000003 PHARM REV CODE 250: Performed by: SURGERY

## 2020-09-16 PROCEDURE — 93005 ELECTROCARDIOGRAM TRACING: CPT

## 2020-09-16 PROCEDURE — 80053 COMPREHEN METABOLIC PANEL: CPT

## 2020-09-16 PROCEDURE — 93010 ELECTROCARDIOGRAM REPORT: CPT | Mod: ,,, | Performed by: INTERNAL MEDICINE

## 2020-09-16 PROCEDURE — 83880 ASSAY OF NATRIURETIC PEPTIDE: CPT

## 2020-09-16 PROCEDURE — 36415 COLL VENOUS BLD VENIPUNCTURE: CPT

## 2020-09-16 RX ORDER — FUROSEMIDE 20 MG/1
20 TABLET ORAL
Status: COMPLETED | OUTPATIENT
Start: 2020-09-16 | End: 2020-09-16

## 2020-09-16 RX ORDER — FUROSEMIDE 20 MG/1
20 TABLET ORAL DAILY
Qty: 30 TABLET | Refills: 0 | Status: SHIPPED | OUTPATIENT
Start: 2020-09-16 | End: 2021-02-16

## 2020-09-16 RX ADMIN — FUROSEMIDE 20 MG: 20 TABLET ORAL at 05:09

## 2020-09-16 NOTE — ED TRIAGE NOTES
89 y.o. female presents to ER ED 03 /ED 03B   Chief Complaint   Patient presents with    low heart rate     sent from home by home health, denies pain/sob   . No acute distress noted.

## 2020-09-16 NOTE — ED PROVIDER NOTES
Encounter Date: 9/16/2020       History     Chief Complaint   Patient presents with    low heart rate     sent from home by home health, denies pain/sob     PATIENT IS 89-YEAR-OLD BLACK FEMALE WITH HISTORY OF CONGESTIVE HEART FAILURE.  SHE APPARENTLY WAS SEEN EARLIER TODAY EITHER AT URGENT CARE OR BY HOME HEALTH, FELT THAT HER HEART RATE WAS ABNORMALLY LOW AND SHE WAS SENT HERE FOR FURTHER EVALUATION.  PATIENT IS ASYMPTOMATIC, DENIES CHEST PAIN.  SHE HAS DYSPNEA ON EXERTION, BUT THAT IS A CHRONIC PROBLEM AND SHE STATES IS BASICALLY UNCHANGED.        Review of patient's allergies indicates:  No Known Allergies  Past Medical History:   Diagnosis Date    Diabetes mellitus     Hypertension     Vertigo      Past Surgical History:   Procedure Laterality Date    CHOLECYSTECTOMY      HERNIA REPAIR       History reviewed. No pertinent family history.  Social History     Tobacco Use    Smoking status: Never Smoker    Smokeless tobacco: Never Used   Substance Use Topics    Alcohol use: No    Drug use: No     Review of Systems   Constitutional: Negative for fever.   HENT: Negative for congestion, ear pain, rhinorrhea, sore throat and trouble swallowing.    Eyes: Negative for pain.   Respiratory: Negative for cough, shortness of breath and wheezing.    Cardiovascular: Negative for chest pain, palpitations and leg swelling.        Bradycardia   Gastrointestinal: Negative for abdominal pain, constipation, diarrhea and nausea.   Genitourinary: Negative for difficulty urinating, dysuria, flank pain, frequency, hematuria and urgency.   Musculoskeletal: Negative for arthralgias, back pain, myalgias and neck pain.   Skin: Negative for rash and wound.   Neurological: Negative for speech difficulty, weakness and headaches.   Hematological: Does not bruise/bleed easily.       Physical Exam     Initial Vitals [09/16/20 1504]   BP Pulse Resp Temp SpO2   (!) 167/75 65 18 98.2 °F (36.8 °C) 95 %      MAP       --         Physical  Exam    Nursing note and vitals reviewed.  Constitutional: She appears well-developed and well-nourished. No distress.   HENT:   Head: Normocephalic and atraumatic.   Nose: Nose normal.   Mouth/Throat: Oropharynx is clear and moist.   Eyes: Conjunctivae and EOM are normal. Pupils are equal, round, and reactive to light.   Neck: Normal range of motion. Neck supple.   Cardiovascular: Normal rate, regular rhythm, normal heart sounds and intact distal pulses.   Pulmonary/Chest: Breath sounds normal. No respiratory distress. She has no wheezes.   Abdominal: Soft. Bowel sounds are normal. There is no abdominal tenderness.   Musculoskeletal: Normal range of motion.   Neurological: She is alert and oriented to person, place, and time. She has normal strength. GCS score is 15. GCS eye subscore is 4. GCS verbal subscore is 5. GCS motor subscore is 6.   Skin: Skin is warm and dry.   Psychiatric: She has a normal mood and affect. Thought content normal.         ED Course   Procedures  Labs Reviewed   COMPREHENSIVE METABOLIC PANEL - Abnormal; Notable for the following components:       Result Value    Glucose 163 (*)     Creatinine 1.5 (*)     eGFR if  34 (*)     eGFR if non  30 (*)     All other components within normal limits   CBC W/ AUTO DIFFERENTIAL - Abnormal; Notable for the following components:    RDW 15.1 (*)     Immature Granulocytes 0.6 (*)     Eosinophil% 10.3 (*)     All other components within normal limits   B-TYPE NATRIURETIC PEPTIDE - Abnormal; Notable for the following components:     (*)     All other components within normal limits   TROPONIN I        ECG Results          EKG 12-lead (Final result)  Result time 09/16/20 16:43:21    Final result by Interface, Lab In [facility] (09/16/20 16:43:21)                 Narrative:    Test Reason : R06.00    Vent. Rate : 062 BPM     Atrial Rate : 060 BPM     P-R Int : 000 ms          QRS Dur : 088 ms      QT Int : 460 ms       P-R-T  Axes : 000 -03 104 degrees     QTc Int : 466 ms    Atrial fibrillation  Nonspecific ST and/or T wave abnormalities  Abnormal ECG  When compared with ECG of 11-AUG-2020 19:45,  Atrial fibrillation has replaced Sinus rhythm    Confirmed by Magen Ferrer MD ([id number]) on 9/16/2020 4:43:12 PM    Referred By: [email or web address]   SELF           Confirmed By:Magen Ferrer MD                            Imaging Results          X-Ray Chest 1 View (Final result)  Result time 09/16/20 16:41:08    Final result by Luiz Garcia III, MD (09/16/20 16:41:08)                 Impression:      Borderline heart size.  Clear lungs.      Electronically signed by: Luiz Garcia MD  Date:    09/16/2020  Time:    16:41             Narrative:    EXAMINATION:  XR CHEST 1 VIEW    CLINICAL HISTORY:  CP;    COMPARISON:  August    FINDINGS:  Borderline heart size with moderate tortuosity of the thoracic aorta and calcification noted along the arch.  Lung fields are clear.                                                             Clinical Impression:     ICD-10-CM ICD-9-CM   1. Chronic combined systolic and diastolic congestive heart failure  I50.42 428.42     428.0   2. Dyspnea  R06.00 786.09                      Disposition:   Disposition: Discharged  Condition: Stable                          Panda Berumen Jr., MD  09/16/20 0044

## 2020-10-06 ENCOUNTER — IMMUNIZATION (OUTPATIENT)
Dept: INTERNAL MEDICINE | Facility: CLINIC | Age: 85
End: 2020-10-06
Payer: MEDICARE

## 2020-10-06 PROCEDURE — G0008 ADMIN INFLUENZA VIRUS VAC: HCPCS | Mod: PBBFAC

## 2020-10-06 PROCEDURE — 90694 VACC AIIV4 NO PRSRV 0.5ML IM: CPT | Mod: PBBFAC

## 2020-11-02 ENCOUNTER — LAB VISIT (OUTPATIENT)
Dept: LAB | Facility: HOSPITAL | Age: 85
End: 2020-11-02
Attending: INTERNAL MEDICINE
Payer: MEDICARE

## 2020-11-02 DIAGNOSIS — E11.9 DM (DIABETES MELLITUS): ICD-10-CM

## 2020-11-02 DIAGNOSIS — I10 HTN (HYPERTENSION): ICD-10-CM

## 2020-11-02 DIAGNOSIS — E78.5 DYSLIPIDEMIA: Primary | ICD-10-CM

## 2020-11-02 DIAGNOSIS — I48.91 ATRIAL FIBRILLATION: ICD-10-CM

## 2020-11-02 LAB
ALBUMIN SERPL BCP-MCNC: 3.8 G/DL (ref 3.5–5.2)
ALP SERPL-CCNC: 70 U/L (ref 55–135)
ALT SERPL W/O P-5'-P-CCNC: 10 U/L (ref 10–44)
ANION GAP SERPL CALC-SCNC: 9 MMOL/L (ref 8–16)
AST SERPL-CCNC: 15 U/L (ref 10–40)
BILIRUB DIRECT SERPL-MCNC: 0.4 MG/DL (ref 0.1–0.3)
BILIRUB SERPL-MCNC: 1.1 MG/DL (ref 0.1–1)
BNP SERPL-MCNC: 159 PG/ML (ref 0–99)
BUN SERPL-MCNC: 21 MG/DL (ref 10–30)
CALCIUM SERPL-MCNC: 9.4 MG/DL (ref 8.7–10.5)
CHLORIDE SERPL-SCNC: 102 MMOL/L (ref 95–110)
CHOLEST SERPL-MCNC: 226 MG/DL (ref 120–199)
CHOLEST/HDLC SERPL: 4 {RATIO} (ref 2–5)
CO2 SERPL-SCNC: 30 MMOL/L (ref 23–29)
CREAT SERPL-MCNC: 1.3 MG/DL (ref 0.5–1.4)
ERYTHROCYTE [DISTWIDTH] IN BLOOD BY AUTOMATED COUNT: 15 % (ref 11.5–14.5)
EST. GFR  (AFRICAN AMERICAN): 41 ML/MIN/1.73 M^2
EST. GFR  (NON AFRICAN AMERICAN): 35 ML/MIN/1.73 M^2
GLUCOSE SERPL-MCNC: 100 MG/DL (ref 70–110)
HCT VFR BLD AUTO: 42.2 % (ref 37–48.5)
HDLC SERPL-MCNC: 57 MG/DL (ref 40–75)
HDLC SERPL: 25.2 % (ref 20–50)
HGB BLD-MCNC: 13.4 G/DL (ref 12–16)
LDLC SERPL CALC-MCNC: 148.6 MG/DL (ref 63–159)
MCH RBC QN AUTO: 28.3 PG (ref 27–31)
MCHC RBC AUTO-ENTMCNC: 31.8 G/DL (ref 32–36)
MCV RBC AUTO: 89 FL (ref 82–98)
NONHDLC SERPL-MCNC: 169 MG/DL
PLATELET # BLD AUTO: 180 K/UL (ref 150–350)
PMV BLD AUTO: 10.6 FL (ref 9.2–12.9)
POTASSIUM SERPL-SCNC: 4.3 MMOL/L (ref 3.5–5.1)
PROT SERPL-MCNC: 7.8 G/DL (ref 6–8.4)
RBC # BLD AUTO: 4.74 M/UL (ref 4–5.4)
SODIUM SERPL-SCNC: 141 MMOL/L (ref 136–145)
T4 FREE SERPL-MCNC: 0.65 NG/DL (ref 0.71–1.51)
TRIGL SERPL-MCNC: 102 MG/DL (ref 30–150)
TSH SERPL DL<=0.005 MIU/L-ACNC: 0.92 UIU/ML (ref 0.4–4)
WBC # BLD AUTO: 3.91 K/UL (ref 3.9–12.7)

## 2020-11-02 PROCEDURE — 36415 COLL VENOUS BLD VENIPUNCTURE: CPT

## 2020-11-02 PROCEDURE — 80076 HEPATIC FUNCTION PANEL: CPT

## 2020-11-02 PROCEDURE — 84443 ASSAY THYROID STIM HORMONE: CPT

## 2020-11-02 PROCEDURE — 80048 BASIC METABOLIC PNL TOTAL CA: CPT

## 2020-11-02 PROCEDURE — 80061 LIPID PANEL: CPT

## 2020-11-02 PROCEDURE — 84481 FREE ASSAY (FT-3): CPT

## 2020-11-02 PROCEDURE — 83880 ASSAY OF NATRIURETIC PEPTIDE: CPT

## 2020-11-02 PROCEDURE — 84439 ASSAY OF FREE THYROXINE: CPT

## 2020-11-02 PROCEDURE — 85027 COMPLETE CBC AUTOMATED: CPT

## 2020-11-03 LAB — T3FREE SERPL-MCNC: 2.4 PG/ML (ref 2.3–4.2)

## 2020-12-31 ENCOUNTER — EXTERNAL CHRONIC CARE MANAGEMENT (OUTPATIENT)
Dept: PRIMARY CARE CLINIC | Facility: CLINIC | Age: 85
End: 2020-12-31
Payer: MEDICARE

## 2020-12-31 PROCEDURE — 99999 PR STA SHADOW: ICD-10-PCS | Mod: PBBFAC,,, | Performed by: INTERNAL MEDICINE

## 2020-12-31 PROCEDURE — 99490 CHRNC CARE MGMT STAFF 1ST 20: CPT | Mod: S$PBB | Performed by: INTERNAL MEDICINE

## 2020-12-31 PROCEDURE — 99999 PR STA SHADOW: CPT | Mod: PBBFAC,,, | Performed by: INTERNAL MEDICINE

## 2021-01-31 ENCOUNTER — EXTERNAL CHRONIC CARE MANAGEMENT (OUTPATIENT)
Dept: PRIMARY CARE CLINIC | Facility: CLINIC | Age: 86
End: 2021-01-31
Payer: MEDICARE

## 2021-01-31 PROCEDURE — 99490 CHRNC CARE MGMT STAFF 1ST 20: CPT | Mod: PBBFAC | Performed by: INTERNAL MEDICINE

## 2021-01-31 PROCEDURE — 99999 PR STA SHADOW: CPT | Mod: PBBFAC,,, | Performed by: INTERNAL MEDICINE

## 2021-01-31 PROCEDURE — 99999 PR STA SHADOW: ICD-10-PCS | Mod: PBBFAC,,, | Performed by: INTERNAL MEDICINE

## 2021-02-04 NOTE — ED NOTES
CR monitor with multiple PVC's and ectopy noted. US Tech at the bedside. Patient denies any symptoms at this time. MD notified.   CT L-Spine without and CT Abdomen Pelvis with IV contrast exams completed.

## 2021-02-24 PROBLEM — I44.30 AV BLOCK: Status: ACTIVE | Noted: 2021-02-24

## 2021-02-24 PROBLEM — R00.1 SYMPTOMATIC BRADYCARDIA: Status: ACTIVE | Noted: 2021-02-24

## 2021-02-25 PROBLEM — I50.32 CHRONIC DIASTOLIC HEART FAILURE: Status: ACTIVE | Noted: 2021-02-25

## 2021-02-28 ENCOUNTER — EXTERNAL CHRONIC CARE MANAGEMENT (OUTPATIENT)
Dept: PRIMARY CARE CLINIC | Facility: CLINIC | Age: 86
End: 2021-02-28
Payer: MEDICARE

## 2021-02-28 PROCEDURE — 99999 PR STA SHADOW: CPT | Mod: PBBFAC,,, | Performed by: INTERNAL MEDICINE

## 2021-02-28 PROCEDURE — 99489 CPLX CHRNC CARE EA ADDL 30: CPT | Mod: S$PBB | Performed by: INTERNAL MEDICINE

## 2021-02-28 PROCEDURE — 99487 CPLX CHRNC CARE 1ST 60 MIN: CPT | Mod: S$PBB | Performed by: INTERNAL MEDICINE

## 2021-02-28 PROCEDURE — 99999 PR STA SHADOW: ICD-10-PCS | Mod: PBBFAC,,, | Performed by: INTERNAL MEDICINE

## 2021-03-31 ENCOUNTER — EXTERNAL CHRONIC CARE MANAGEMENT (OUTPATIENT)
Dept: PRIMARY CARE CLINIC | Facility: CLINIC | Age: 86
End: 2021-03-31
Payer: MEDICARE

## 2021-03-31 PROCEDURE — 99999 PR STA SHADOW: CPT | Mod: PBBFAC,,, | Performed by: INTERNAL MEDICINE

## 2021-03-31 PROCEDURE — 99490 CHRNC CARE MGMT STAFF 1ST 20: CPT | Mod: S$PBB | Performed by: INTERNAL MEDICINE

## 2021-03-31 PROCEDURE — 99999 PR STA SHADOW: ICD-10-PCS | Mod: PBBFAC,,, | Performed by: INTERNAL MEDICINE

## 2021-04-30 ENCOUNTER — HOSPITAL ENCOUNTER (EMERGENCY)
Facility: HOSPITAL | Age: 86
Discharge: HOME | End: 2021-04-30
Attending: SURGERY
Payer: MEDICARE

## 2021-04-30 ENCOUNTER — EXTERNAL CHRONIC CARE MANAGEMENT (OUTPATIENT)
Dept: PRIMARY CARE CLINIC | Facility: CLINIC | Age: 86
End: 2021-04-30
Payer: MEDICARE

## 2021-04-30 VITALS
SYSTOLIC BLOOD PRESSURE: 156 MMHG | HEART RATE: 72 BPM | RESPIRATION RATE: 16 BRPM | TEMPERATURE: 97 F | DIASTOLIC BLOOD PRESSURE: 72 MMHG | WEIGHT: 155.44 LBS | OXYGEN SATURATION: 96 % | BODY MASS INDEX: 26.68 KG/M2

## 2021-04-30 DIAGNOSIS — R10.9 ABDOMINAL PAIN: ICD-10-CM

## 2021-04-30 DIAGNOSIS — R10.9 ABDOMINAL PAIN, UNSPECIFIED ABDOMINAL LOCATION: ICD-10-CM

## 2021-04-30 DIAGNOSIS — K21.9 GASTROESOPHAGEAL REFLUX DISEASE, UNSPECIFIED WHETHER ESOPHAGITIS PRESENT: ICD-10-CM

## 2021-04-30 DIAGNOSIS — N39.0 URINARY TRACT INFECTION WITH HEMATURIA, SITE UNSPECIFIED: Primary | ICD-10-CM

## 2021-04-30 DIAGNOSIS — R11.2 NON-INTRACTABLE VOMITING WITH NAUSEA, UNSPECIFIED VOMITING TYPE: ICD-10-CM

## 2021-04-30 DIAGNOSIS — R31.9 URINARY TRACT INFECTION WITH HEMATURIA, SITE UNSPECIFIED: Primary | ICD-10-CM

## 2021-04-30 LAB
ALBUMIN SERPL BCP-MCNC: 4 G/DL (ref 3.5–5.2)
ALP SERPL-CCNC: 91 U/L (ref 55–135)
ALT SERPL W/O P-5'-P-CCNC: 24 U/L (ref 10–44)
ANION GAP SERPL CALC-SCNC: 9 MMOL/L (ref 8–16)
AST SERPL-CCNC: 26 U/L (ref 10–40)
BACTERIA #/AREA URNS HPF: ABNORMAL /HPF
BASOPHILS # BLD AUTO: 0.04 K/UL (ref 0–0.2)
BASOPHILS NFR BLD: 0.9 % (ref 0–1.9)
BILIRUB SERPL-MCNC: 2 MG/DL (ref 0.1–1)
BILIRUB UR QL STRIP: NEGATIVE
BUN SERPL-MCNC: 13 MG/DL (ref 10–30)
CALCIUM SERPL-MCNC: 8.8 MG/DL (ref 8.7–10.5)
CHLORIDE SERPL-SCNC: 100 MMOL/L (ref 95–110)
CK MB SERPL-MCNC: 0.7 NG/ML (ref 0.1–6.5)
CK MB SERPL-RTO: 0.4 % (ref 0–5)
CK SERPL-CCNC: 188 U/L (ref 20–180)
CK SERPL-CCNC: 188 U/L (ref 20–180)
CLARITY UR: CLEAR
CO2 SERPL-SCNC: 34 MMOL/L (ref 23–29)
COLOR UR: YELLOW
CREAT SERPL-MCNC: 1.1 MG/DL (ref 0.5–1.4)
DIFFERENTIAL METHOD: ABNORMAL
EOSINOPHIL # BLD AUTO: 0.7 K/UL (ref 0–0.5)
EOSINOPHIL NFR BLD: 16.4 % (ref 0–8)
ERYTHROCYTE [DISTWIDTH] IN BLOOD BY AUTOMATED COUNT: 15.5 % (ref 11.5–14.5)
EST. GFR  (AFRICAN AMERICAN): 49 ML/MIN/1.73 M^2
EST. GFR  (NON AFRICAN AMERICAN): 43 ML/MIN/1.73 M^2
GLUCOSE SERPL-MCNC: 108 MG/DL (ref 70–110)
GLUCOSE UR QL STRIP: NEGATIVE
HCT VFR BLD AUTO: 38.2 % (ref 37–48.5)
HGB BLD-MCNC: 12.1 G/DL (ref 12–16)
HGB UR QL STRIP: ABNORMAL
HYALINE CASTS #/AREA URNS LPF: 1 /LPF
IMM GRANULOCYTES # BLD AUTO: 0 K/UL (ref 0–0.04)
IMM GRANULOCYTES NFR BLD AUTO: 0 % (ref 0–0.5)
KETONES UR QL STRIP: NEGATIVE
LEUKOCYTE ESTERASE UR QL STRIP: ABNORMAL
LIPASE SERPL-CCNC: 21 U/L (ref 4–60)
LYMPHOCYTES # BLD AUTO: 2 K/UL (ref 1–4.8)
LYMPHOCYTES NFR BLD: 45.1 % (ref 18–48)
MCH RBC QN AUTO: 27.6 PG (ref 27–31)
MCHC RBC AUTO-ENTMCNC: 31.7 G/DL (ref 32–36)
MCV RBC AUTO: 87 FL (ref 82–98)
MICROSCOPIC COMMENT: ABNORMAL
MONOCYTES # BLD AUTO: 0.3 K/UL (ref 0.3–1)
MONOCYTES NFR BLD: 7.3 % (ref 4–15)
NEUTROPHILS # BLD AUTO: 1.4 K/UL (ref 1.8–7.7)
NEUTROPHILS NFR BLD: 30.3 % (ref 38–73)
NITRITE UR QL STRIP: NEGATIVE
NRBC BLD-RTO: 0 /100 WBC
PH UR STRIP: >8 [PH] (ref 5–8)
PLATELET # BLD AUTO: 167 K/UL (ref 150–450)
PMV BLD AUTO: 9.8 FL (ref 9.2–12.9)
POTASSIUM SERPL-SCNC: 3.6 MMOL/L (ref 3.5–5.1)
PROT SERPL-MCNC: 7.6 G/DL (ref 6–8.4)
PROT UR QL STRIP: ABNORMAL
RBC # BLD AUTO: 4.38 M/UL (ref 4–5.4)
RBC #/AREA URNS HPF: 30 /HPF (ref 0–4)
SODIUM SERPL-SCNC: 143 MMOL/L (ref 136–145)
SP GR UR STRIP: 1.02 (ref 1–1.03)
SQUAMOUS #/AREA URNS HPF: 1 /HPF
TROPONIN I SERPL DL<=0.01 NG/ML-MCNC: 0.02 NG/ML (ref 0–0.03)
URN SPEC COLLECT METH UR: ABNORMAL
UROBILINOGEN UR STRIP-ACNC: NEGATIVE EU/DL
WBC # BLD AUTO: 4.52 K/UL (ref 3.9–12.7)
WBC #/AREA URNS HPF: 5 /HPF (ref 0–5)

## 2021-04-30 PROCEDURE — 25500020 PHARM REV CODE 255: Performed by: SURGERY

## 2021-04-30 PROCEDURE — 81000 URINALYSIS NONAUTO W/SCOPE: CPT | Performed by: SURGERY

## 2021-04-30 PROCEDURE — 80053 COMPREHEN METABOLIC PANEL: CPT | Performed by: SURGERY

## 2021-04-30 PROCEDURE — 87086 URINE CULTURE/COLONY COUNT: CPT | Performed by: SURGERY

## 2021-04-30 PROCEDURE — 99490 CHRNC CARE MGMT STAFF 1ST 20: CPT | Mod: S$PBB | Performed by: INTERNAL MEDICINE

## 2021-04-30 PROCEDURE — 93010 ELECTROCARDIOGRAM REPORT: CPT | Mod: ,,, | Performed by: INTERNAL MEDICINE

## 2021-04-30 PROCEDURE — 85025 COMPLETE CBC W/AUTO DIFF WBC: CPT | Performed by: SURGERY

## 2021-04-30 PROCEDURE — 25000003 PHARM REV CODE 250: Performed by: SURGERY

## 2021-04-30 PROCEDURE — 96361 HYDRATE IV INFUSION ADD-ON: CPT

## 2021-04-30 PROCEDURE — 93010 EKG 12-LEAD: ICD-10-PCS | Mod: ,,, | Performed by: INTERNAL MEDICINE

## 2021-04-30 PROCEDURE — 99285 EMERGENCY DEPT VISIT HI MDM: CPT | Mod: 25

## 2021-04-30 PROCEDURE — 99999 PR STA SHADOW: CPT | Mod: PBBFAC,,, | Performed by: INTERNAL MEDICINE

## 2021-04-30 PROCEDURE — 96374 THER/PROPH/DIAG INJ IV PUSH: CPT

## 2021-04-30 PROCEDURE — 82550 ASSAY OF CK (CPK): CPT | Performed by: SURGERY

## 2021-04-30 PROCEDURE — 93005 ELECTROCARDIOGRAM TRACING: CPT

## 2021-04-30 PROCEDURE — 99999 PR STA SHADOW: ICD-10-PCS | Mod: PBBFAC,,, | Performed by: INTERNAL MEDICINE

## 2021-04-30 PROCEDURE — 25000003 PHARM REV CODE 250: Performed by: NURSE PRACTITIONER

## 2021-04-30 PROCEDURE — 83690 ASSAY OF LIPASE: CPT | Performed by: SURGERY

## 2021-04-30 PROCEDURE — 84484 ASSAY OF TROPONIN QUANT: CPT | Performed by: SURGERY

## 2021-04-30 PROCEDURE — 63600175 PHARM REV CODE 636 W HCPCS: Performed by: SURGERY

## 2021-04-30 RX ORDER — SODIUM CHLORIDE 9 MG/ML
INJECTION, SOLUTION INTRAVENOUS
Status: COMPLETED | OUTPATIENT
Start: 2021-04-30 | End: 2021-04-30

## 2021-04-30 RX ORDER — ONDANSETRON 2 MG/ML
4 INJECTION INTRAMUSCULAR; INTRAVENOUS
Status: COMPLETED | OUTPATIENT
Start: 2021-04-30 | End: 2021-04-30

## 2021-04-30 RX ORDER — AMOXICILLIN AND CLAVULANATE POTASSIUM 500; 125 MG/1; MG/1
1 TABLET, FILM COATED ORAL
Status: COMPLETED | OUTPATIENT
Start: 2021-04-30 | End: 2021-04-30

## 2021-04-30 RX ORDER — AMOXICILLIN AND CLAVULANATE POTASSIUM 500; 125 MG/1; MG/1
1 TABLET, FILM COATED ORAL 2 TIMES DAILY
Qty: 14 TABLET | Refills: 0 | Status: SHIPPED | OUTPATIENT
Start: 2021-04-30 | End: 2021-05-07

## 2021-04-30 RX ORDER — ONDANSETRON 4 MG/1
4 TABLET, ORALLY DISINTEGRATING ORAL EVERY 8 HOURS PRN
Qty: 9 TABLET | Refills: 0 | Status: SHIPPED | OUTPATIENT
Start: 2021-04-30 | End: 2021-05-03

## 2021-04-30 RX ADMIN — ONDANSETRON 4 MG: 2 INJECTION INTRAMUSCULAR; INTRAVENOUS at 11:04

## 2021-04-30 RX ADMIN — IOHEXOL 75 ML: 350 INJECTION, SOLUTION INTRAVENOUS at 12:04

## 2021-04-30 RX ADMIN — SODIUM CHLORIDE: 0.9 INJECTION, SOLUTION INTRAVENOUS at 11:04

## 2021-04-30 RX ADMIN — LIDOCAINE HYDROCHLORIDE 50 ML: 20 SOLUTION ORAL; TOPICAL at 01:04

## 2021-04-30 RX ADMIN — AMOXICILLIN AND CLAVULANATE POTASSIUM 500 MG: 500; 125 TABLET, FILM COATED ORAL at 01:04

## 2021-04-30 RX ADMIN — IOHEXOL 30 ML: 350 INJECTION, SOLUTION INTRAVENOUS at 12:04

## 2021-05-01 LAB — BACTERIA UR CULT: NO GROWTH

## 2021-05-11 ENCOUNTER — TELEPHONE (OUTPATIENT)
Dept: INTERNAL MEDICINE | Facility: CLINIC | Age: 86
End: 2021-05-11

## 2021-05-11 ENCOUNTER — OFFICE VISIT (OUTPATIENT)
Dept: INTERNAL MEDICINE | Facility: CLINIC | Age: 86
End: 2021-05-11
Payer: MEDICARE

## 2021-05-11 ENCOUNTER — LAB VISIT (OUTPATIENT)
Dept: LAB | Facility: HOSPITAL | Age: 86
End: 2021-05-11
Attending: INTERNAL MEDICINE
Payer: MEDICARE

## 2021-05-11 VITALS
OXYGEN SATURATION: 97 % | RESPIRATION RATE: 16 BRPM | BODY MASS INDEX: 26.64 KG/M2 | HEIGHT: 64 IN | HEART RATE: 82 BPM | WEIGHT: 156.06 LBS | SYSTOLIC BLOOD PRESSURE: 122 MMHG | DIASTOLIC BLOOD PRESSURE: 70 MMHG

## 2021-05-11 DIAGNOSIS — I10 ESSENTIAL HYPERTENSION: ICD-10-CM

## 2021-05-11 DIAGNOSIS — R31.29 OTHER MICROSCOPIC HEMATURIA: Primary | ICD-10-CM

## 2021-05-11 DIAGNOSIS — E11.21 TYPE 2 DIABETES WITH NEPHROPATHY: Primary | ICD-10-CM

## 2021-05-11 DIAGNOSIS — N30.01 ACUTE CYSTITIS WITH HEMATURIA: ICD-10-CM

## 2021-05-11 DIAGNOSIS — E78.2 MIXED HYPERLIPIDEMIA: ICD-10-CM

## 2021-05-11 LAB
BACTERIA #/AREA URNS HPF: ABNORMAL /HPF
BILIRUB UR QL STRIP: NEGATIVE
CLARITY UR: CLEAR
COLOR UR: YELLOW
GLUCOSE UR QL STRIP: NEGATIVE
HGB UR QL STRIP: ABNORMAL
HYALINE CASTS #/AREA URNS LPF: 0 /LPF
KETONES UR QL STRIP: NEGATIVE
LEUKOCYTE ESTERASE UR QL STRIP: NEGATIVE
MICROSCOPIC COMMENT: ABNORMAL
NITRITE UR QL STRIP: NEGATIVE
PH UR STRIP: 7 [PH] (ref 5–8)
PROT UR QL STRIP: ABNORMAL
RBC #/AREA URNS HPF: >100 /HPF (ref 0–4)
SP GR UR STRIP: 1.02 (ref 1–1.03)
SQUAMOUS #/AREA URNS HPF: 5 /HPF
URN SPEC COLLECT METH UR: ABNORMAL
UROBILINOGEN UR STRIP-ACNC: 1 EU/DL
WBC #/AREA URNS HPF: 2 /HPF (ref 0–5)

## 2021-05-11 PROCEDURE — 81000 URINALYSIS NONAUTO W/SCOPE: CPT | Performed by: INTERNAL MEDICINE

## 2021-05-11 PROCEDURE — 99999 PR STA SHADOW: CPT | Mod: PBBFAC,,, | Performed by: INTERNAL MEDICINE

## 2021-05-11 PROCEDURE — 99213 OFFICE O/P EST LOW 20 MIN: CPT | Mod: PBBFAC | Performed by: INTERNAL MEDICINE

## 2021-05-11 PROCEDURE — 99999 PR STA SHADOW: ICD-10-PCS | Mod: PBBFAC,,, | Performed by: INTERNAL MEDICINE

## 2021-05-11 PROCEDURE — 99999 PR PBB SHADOW E&M-EST. PATIENT-LVL III: CPT | Mod: PBBFAC,,, | Performed by: INTERNAL MEDICINE

## 2021-05-11 PROCEDURE — 99214 OFFICE O/P EST MOD 30 MIN: CPT | Mod: S$PBB | Performed by: INTERNAL MEDICINE

## 2021-05-11 RX ORDER — CYCLOSPORINE 0.5 MG/ML
EMULSION OPHTHALMIC
COMMUNITY
Start: 2021-04-30

## 2021-05-11 RX ORDER — MUPIROCIN 20 MG/G
OINTMENT TOPICAL
COMMUNITY
Start: 2021-02-15 | End: 2021-07-15 | Stop reason: CLARIF

## 2021-05-11 RX ORDER — HYDROCODONE BITARTRATE AND ACETAMINOPHEN 5; 325 MG/1; MG/1
1 TABLET ORAL EVERY 6 HOURS PRN
COMMUNITY
Start: 2021-02-25 | End: 2021-07-15 | Stop reason: CLARIF

## 2021-05-11 RX ORDER — LISINOPRIL 20 MG/1
20 TABLET ORAL DAILY
Qty: 90 TABLET | Refills: 3 | Status: SHIPPED | OUTPATIENT
Start: 2021-05-11 | End: 2021-07-15 | Stop reason: CLARIF

## 2021-05-26 ENCOUNTER — OFFICE VISIT (OUTPATIENT)
Dept: UROLOGY | Facility: CLINIC | Age: 86
End: 2021-05-26
Attending: UROLOGY
Payer: MEDICARE

## 2021-05-26 VITALS
BODY MASS INDEX: 26.63 KG/M2 | HEART RATE: 84 BPM | HEIGHT: 64 IN | RESPIRATION RATE: 15 BRPM | SYSTOLIC BLOOD PRESSURE: 136 MMHG | WEIGHT: 156 LBS | DIASTOLIC BLOOD PRESSURE: 75 MMHG

## 2021-05-26 DIAGNOSIS — R31.29 OTHER MICROSCOPIC HEMATURIA: ICD-10-CM

## 2021-05-26 PROCEDURE — 99204 OFFICE O/P NEW MOD 45 MIN: CPT | Mod: S$PBB | Performed by: UROLOGY

## 2021-05-26 PROCEDURE — 99999 PR PBB SHADOW E&M-EST. PATIENT-LVL V: CPT | Mod: PBBFAC,,, | Performed by: UROLOGY

## 2021-05-26 PROCEDURE — 99215 OFFICE O/P EST HI 40 MIN: CPT | Mod: PBBFAC | Performed by: UROLOGY

## 2021-05-26 PROCEDURE — 99999 PR PBB SHADOW E&M-EST. PATIENT-LVL V: ICD-10-PCS | Mod: PBBFAC,,, | Performed by: UROLOGY

## 2021-05-26 PROCEDURE — 99999 PR STA SHADOW: CPT | Mod: PBBFAC,,, | Performed by: UROLOGY

## 2021-05-31 ENCOUNTER — EXTERNAL CHRONIC CARE MANAGEMENT (OUTPATIENT)
Dept: PRIMARY CARE CLINIC | Facility: CLINIC | Age: 86
End: 2021-05-31
Payer: MEDICARE

## 2021-05-31 PROCEDURE — 99999 PR STA SHADOW: CPT | Mod: PBBFAC,,, | Performed by: INTERNAL MEDICINE

## 2021-05-31 PROCEDURE — 99999 PR STA SHADOW: ICD-10-PCS | Mod: PBBFAC,,, | Performed by: INTERNAL MEDICINE

## 2021-05-31 PROCEDURE — 99490 CHRNC CARE MGMT STAFF 1ST 20: CPT | Mod: PBBFAC | Performed by: INTERNAL MEDICINE

## 2021-05-31 PROCEDURE — 99439 CHRNC CARE MGMT STAF EA ADDL: CPT | Mod: S$PBB | Performed by: INTERNAL MEDICINE

## 2021-06-08 ENCOUNTER — TELEPHONE (OUTPATIENT)
Dept: UROLOGY | Facility: CLINIC | Age: 86
End: 2021-06-08

## 2021-06-30 ENCOUNTER — TELEPHONE (OUTPATIENT)
Dept: UROLOGY | Facility: CLINIC | Age: 86
End: 2021-06-30

## 2021-06-30 ENCOUNTER — EXTERNAL CHRONIC CARE MANAGEMENT (OUTPATIENT)
Dept: PRIMARY CARE CLINIC | Facility: CLINIC | Age: 86
End: 2021-06-30
Payer: MEDICARE

## 2021-06-30 ENCOUNTER — OFFICE VISIT (OUTPATIENT)
Dept: UROLOGY | Facility: CLINIC | Age: 86
End: 2021-06-30
Attending: UROLOGY
Payer: MEDICARE

## 2021-06-30 VITALS
SYSTOLIC BLOOD PRESSURE: 126 MMHG | BODY MASS INDEX: 26.42 KG/M2 | WEIGHT: 154.75 LBS | HEART RATE: 64 BPM | HEIGHT: 64 IN | DIASTOLIC BLOOD PRESSURE: 64 MMHG | OXYGEN SATURATION: 98 %

## 2021-06-30 DIAGNOSIS — R31.29 HEMATURIA, MICROSCOPIC: Primary | ICD-10-CM

## 2021-06-30 PROCEDURE — 99999 PR STA SHADOW: CPT | Mod: PBBFAC,,, | Performed by: UROLOGY

## 2021-06-30 PROCEDURE — 99214 OFFICE O/P EST MOD 30 MIN: CPT | Mod: PBBFAC,25 | Performed by: UROLOGY

## 2021-06-30 PROCEDURE — 99999 PR STA SHADOW: CPT | Mod: PBBFAC,,, | Performed by: INTERNAL MEDICINE

## 2021-06-30 PROCEDURE — 99204 OFFICE O/P NEW MOD 45 MIN: CPT | Mod: S$PBB | Performed by: UROLOGY

## 2021-06-30 PROCEDURE — 99999 PR PBB SHADOW E&M-EST. PATIENT-LVL IV: ICD-10-PCS | Mod: PBBFAC,,, | Performed by: UROLOGY

## 2021-06-30 PROCEDURE — 99490 CHRNC CARE MGMT STAFF 1ST 20: CPT | Mod: S$PBB | Performed by: INTERNAL MEDICINE

## 2021-06-30 PROCEDURE — 99999 PR PBB SHADOW E&M-EST. PATIENT-LVL IV: CPT | Mod: PBBFAC,,, | Performed by: UROLOGY

## 2021-06-30 PROCEDURE — 99999 PR STA SHADOW: ICD-10-PCS | Mod: PBBFAC,,, | Performed by: INTERNAL MEDICINE

## 2021-06-30 RX ORDER — APIXABAN 2.5 MG/1
2.5 TABLET, FILM COATED ORAL 2 TIMES DAILY
COMMUNITY
Start: 2021-06-15

## 2021-06-30 RX ORDER — VALSARTAN 160 MG/1
160 TABLET ORAL NIGHTLY
Status: ON HOLD | COMMUNITY
Start: 2021-06-15 | End: 2022-09-22 | Stop reason: HOSPADM

## 2021-06-30 RX ORDER — CIPROFLOXACIN 500 MG/1
500 TABLET ORAL 2 TIMES DAILY
COMMUNITY
Start: 2021-06-24 | End: 2021-07-15 | Stop reason: CLARIF

## 2021-07-15 ENCOUNTER — TELEPHONE (OUTPATIENT)
Dept: UROLOGY | Facility: CLINIC | Age: 86
End: 2021-07-15

## 2021-07-15 ENCOUNTER — PATIENT MESSAGE (OUTPATIENT)
Dept: UROLOGY | Facility: CLINIC | Age: 86
End: 2021-07-15

## 2021-07-15 RX ORDER — AMLODIPINE BESYLATE 10 MG/1
10 TABLET ORAL DAILY
COMMUNITY
Start: 2021-04-30

## 2021-07-15 RX ORDER — HYDROCHLOROTHIAZIDE 25 MG/1
25 TABLET ORAL DAILY
Status: ON HOLD | COMMUNITY
Start: 2021-06-15 | End: 2022-09-22 | Stop reason: HOSPADM

## 2021-07-15 RX ORDER — FUROSEMIDE 20 MG/1
20 TABLET ORAL DAILY
COMMUNITY
Start: 2021-06-24

## 2021-07-15 RX ORDER — METOPROLOL SUCCINATE 50 MG/1
100 TABLET, EXTENDED RELEASE ORAL DAILY
Status: ON HOLD | COMMUNITY
Start: 2021-05-28 | End: 2022-09-22 | Stop reason: HOSPADM

## 2021-07-16 ENCOUNTER — ANESTHESIA (OUTPATIENT)
Dept: SURGERY | Facility: HOSPITAL | Age: 86
End: 2021-07-16
Payer: MEDICARE

## 2021-07-16 ENCOUNTER — ANESTHESIA EVENT (OUTPATIENT)
Dept: SURGERY | Facility: HOSPITAL | Age: 86
End: 2021-07-16
Payer: MEDICARE

## 2021-07-16 ENCOUNTER — HOSPITAL ENCOUNTER (OUTPATIENT)
Facility: HOSPITAL | Age: 86
Discharge: HOME | End: 2021-07-16
Attending: UROLOGY | Admitting: UROLOGY
Payer: MEDICARE

## 2021-07-16 VITALS
TEMPERATURE: 98 F | OXYGEN SATURATION: 95 % | HEIGHT: 64 IN | RESPIRATION RATE: 21 BRPM | DIASTOLIC BLOOD PRESSURE: 94 MMHG | WEIGHT: 156 LBS | HEART RATE: 70 BPM | SYSTOLIC BLOOD PRESSURE: 170 MMHG | BODY MASS INDEX: 26.63 KG/M2

## 2021-07-16 DIAGNOSIS — R31.9 HEMATURIA: ICD-10-CM

## 2021-07-16 DIAGNOSIS — N20.0 NEPHROLITHIASIS: ICD-10-CM

## 2021-07-16 LAB — POCT GLUCOSE: 104 MG/DL (ref 70–110)

## 2021-07-16 PROCEDURE — D9220A PRA ANESTHESIA: Mod: ANES,,, | Performed by: ANESTHESIOLOGY

## 2021-07-16 PROCEDURE — 74420 PR  X-RAY RETROGRADE PYELOGRAM: ICD-10-PCS | Mod: 26,,, | Performed by: UROLOGY

## 2021-07-16 PROCEDURE — 74420 UROGRAPHY RTRGR +-KUB: CPT | Mod: 26,,, | Performed by: UROLOGY

## 2021-07-16 PROCEDURE — 52005 CYSTO W/URTRL CATHJ: CPT | Mod: ,,, | Performed by: UROLOGY

## 2021-07-16 PROCEDURE — 63600175 PHARM REV CODE 636 W HCPCS: Performed by: STUDENT IN AN ORGANIZED HEALTH CARE EDUCATION/TRAINING PROGRAM

## 2021-07-16 PROCEDURE — 71000044 HC DOSC ROUTINE RECOVERY FIRST HOUR: Performed by: UROLOGY

## 2021-07-16 PROCEDURE — 71000015 HC POSTOP RECOV 1ST HR: Performed by: UROLOGY

## 2021-07-16 PROCEDURE — D9220A PRA ANESTHESIA: ICD-10-PCS | Mod: CRNA,,, | Performed by: STUDENT IN AN ORGANIZED HEALTH CARE EDUCATION/TRAINING PROGRAM

## 2021-07-16 PROCEDURE — 36000707: Performed by: UROLOGY

## 2021-07-16 PROCEDURE — 36000706: Performed by: UROLOGY

## 2021-07-16 PROCEDURE — C1758 CATHETER, URETERAL: HCPCS | Performed by: UROLOGY

## 2021-07-16 PROCEDURE — 37000008 HC ANESTHESIA 1ST 15 MINUTES: Performed by: UROLOGY

## 2021-07-16 PROCEDURE — 82962 GLUCOSE BLOOD TEST: CPT | Performed by: UROLOGY

## 2021-07-16 PROCEDURE — 25000003 PHARM REV CODE 250: Performed by: STUDENT IN AN ORGANIZED HEALTH CARE EDUCATION/TRAINING PROGRAM

## 2021-07-16 PROCEDURE — 37000009 HC ANESTHESIA EA ADD 15 MINS: Performed by: UROLOGY

## 2021-07-16 PROCEDURE — D9220A PRA ANESTHESIA: ICD-10-PCS | Mod: ANES,,, | Performed by: ANESTHESIOLOGY

## 2021-07-16 PROCEDURE — 25500020 PHARM REV CODE 255: Performed by: UROLOGY

## 2021-07-16 PROCEDURE — D9220A PRA ANESTHESIA: Mod: CRNA,,, | Performed by: STUDENT IN AN ORGANIZED HEALTH CARE EDUCATION/TRAINING PROGRAM

## 2021-07-16 PROCEDURE — 52005 PR CYSTOURETHROSCOPY,URETER CATHETER: ICD-10-PCS | Mod: ,,, | Performed by: UROLOGY

## 2021-07-16 RX ORDER — ONDANSETRON 2 MG/ML
INJECTION INTRAMUSCULAR; INTRAVENOUS
Status: DISCONTINUED | OUTPATIENT
Start: 2021-07-16 | End: 2021-07-16

## 2021-07-16 RX ORDER — PROPOFOL 10 MG/ML
VIAL (ML) INTRAVENOUS CONTINUOUS PRN
Status: DISCONTINUED | OUTPATIENT
Start: 2021-07-16 | End: 2021-07-16

## 2021-07-16 RX ORDER — FENTANYL CITRATE 50 UG/ML
INJECTION, SOLUTION INTRAMUSCULAR; INTRAVENOUS
Status: DISCONTINUED | OUTPATIENT
Start: 2021-07-16 | End: 2021-07-16

## 2021-07-16 RX ORDER — HYDROMORPHONE HYDROCHLORIDE 1 MG/ML
0.2 INJECTION, SOLUTION INTRAMUSCULAR; INTRAVENOUS; SUBCUTANEOUS EVERY 5 MIN PRN
Status: DISCONTINUED | OUTPATIENT
Start: 2021-07-16 | End: 2021-07-16 | Stop reason: HOSPADM

## 2021-07-16 RX ORDER — ONDANSETRON 2 MG/ML
4 INJECTION INTRAMUSCULAR; INTRAVENOUS DAILY PRN
Status: DISCONTINUED | OUTPATIENT
Start: 2021-07-16 | End: 2021-07-16 | Stop reason: HOSPADM

## 2021-07-16 RX ORDER — LIDOCAINE HYDROCHLORIDE 20 MG/ML
INJECTION, SOLUTION EPIDURAL; INFILTRATION; INTRACAUDAL; PERINEURAL
Status: DISCONTINUED | OUTPATIENT
Start: 2021-07-16 | End: 2021-07-16

## 2021-07-16 RX ORDER — CEFAZOLIN SODIUM 1 G/3ML
2 INJECTION, POWDER, FOR SOLUTION INTRAMUSCULAR; INTRAVENOUS
Status: COMPLETED | OUTPATIENT
Start: 2021-07-16 | End: 2021-07-16

## 2021-07-16 RX ORDER — FENTANYL CITRATE 50 UG/ML
25 INJECTION, SOLUTION INTRAMUSCULAR; INTRAVENOUS EVERY 5 MIN PRN
Status: DISCONTINUED | OUTPATIENT
Start: 2021-07-16 | End: 2021-07-16 | Stop reason: HOSPADM

## 2021-07-16 RX ORDER — SODIUM CHLORIDE 0.9 % (FLUSH) 0.9 %
3 SYRINGE (ML) INJECTION
Status: DISCONTINUED | OUTPATIENT
Start: 2021-07-16 | End: 2021-07-16 | Stop reason: HOSPADM

## 2021-07-16 RX ORDER — PROPOFOL 10 MG/ML
VIAL (ML) INTRAVENOUS
Status: DISCONTINUED | OUTPATIENT
Start: 2021-07-16 | End: 2021-07-16

## 2021-07-16 RX ADMIN — LIDOCAINE HYDROCHLORIDE 60 MG: 20 INJECTION, SOLUTION EPIDURAL; INFILTRATION; INTRACAUDAL at 02:07

## 2021-07-16 RX ADMIN — CEFAZOLIN 2 G: 330 INJECTION, POWDER, FOR SOLUTION INTRAMUSCULAR; INTRAVENOUS at 02:07

## 2021-07-16 RX ADMIN — ONDANSETRON 4 MG: 2 INJECTION INTRAMUSCULAR; INTRAVENOUS at 02:07

## 2021-07-16 RX ADMIN — FENTANYL CITRATE 25 MCG: 50 INJECTION INTRAMUSCULAR; INTRAVENOUS at 02:07

## 2021-07-16 RX ADMIN — SODIUM CHLORIDE: 9 INJECTION, SOLUTION INTRAVENOUS at 02:07

## 2021-07-16 RX ADMIN — PROPOFOL 10 MG: 10 INJECTION, EMULSION INTRAVENOUS at 02:07

## 2021-07-16 RX ADMIN — PROPOFOL 30 MG: 10 INJECTION, EMULSION INTRAVENOUS at 02:07

## 2021-07-16 RX ADMIN — PROPOFOL 75 MCG/KG/MIN: 10 INJECTION, EMULSION INTRAVENOUS at 02:07

## 2021-07-31 ENCOUNTER — EXTERNAL CHRONIC CARE MANAGEMENT (OUTPATIENT)
Dept: PRIMARY CARE CLINIC | Facility: CLINIC | Age: 86
End: 2021-07-31
Payer: MEDICARE

## 2021-07-31 PROCEDURE — 99999 PR STA SHADOW: CPT | Mod: PBBFAC,,, | Performed by: INTERNAL MEDICINE

## 2021-07-31 PROCEDURE — 99999 PR STA SHADOW: ICD-10-PCS | Mod: PBBFAC,,, | Performed by: INTERNAL MEDICINE

## 2021-07-31 PROCEDURE — 99490 CHRNC CARE MGMT STAFF 1ST 20: CPT | Mod: PBBFAC | Performed by: INTERNAL MEDICINE

## 2021-08-05 ENCOUNTER — HOSPITAL ENCOUNTER (EMERGENCY)
Facility: HOSPITAL | Age: 86
Discharge: HOME | End: 2021-08-05
Attending: SURGERY
Payer: MEDICARE

## 2021-08-05 VITALS
WEIGHT: 151.88 LBS | BODY MASS INDEX: 26.07 KG/M2 | OXYGEN SATURATION: 96 % | RESPIRATION RATE: 18 BRPM | DIASTOLIC BLOOD PRESSURE: 100 MMHG | SYSTOLIC BLOOD PRESSURE: 183 MMHG | TEMPERATURE: 98 F | HEART RATE: 60 BPM

## 2021-08-05 DIAGNOSIS — R05.8 PRODUCTIVE COUGH: ICD-10-CM

## 2021-08-05 DIAGNOSIS — Z20.822 CLOSE EXPOSURE TO COVID-19 VIRUS: Primary | ICD-10-CM

## 2021-08-05 LAB — SARS-COV-2 RDRP RESP QL NAA+PROBE: NEGATIVE

## 2021-08-05 PROCEDURE — U0002 COVID-19 LAB TEST NON-CDC: HCPCS | Performed by: SURGERY

## 2021-08-05 PROCEDURE — 99283 EMERGENCY DEPT VISIT LOW MDM: CPT | Mod: 25

## 2021-08-05 RX ORDER — BENZONATATE 100 MG/1
100 CAPSULE ORAL 3 TIMES DAILY PRN
Qty: 20 CAPSULE | Refills: 0 | Status: SHIPPED | OUTPATIENT
Start: 2021-08-05 | End: 2021-08-15

## 2021-08-31 ENCOUNTER — EXTERNAL CHRONIC CARE MANAGEMENT (OUTPATIENT)
Dept: PRIMARY CARE CLINIC | Facility: CLINIC | Age: 86
End: 2021-08-31
Payer: MEDICARE

## 2021-08-31 PROCEDURE — 99999 PR STA SHADOW: CPT | Mod: PBBFAC,,, | Performed by: INTERNAL MEDICINE

## 2021-08-31 PROCEDURE — 99999 PR STA SHADOW: ICD-10-PCS | Mod: PBBFAC,,, | Performed by: INTERNAL MEDICINE

## 2021-08-31 PROCEDURE — 99490 CHRNC CARE MGMT STAFF 1ST 20: CPT | Mod: S$PBB | Performed by: INTERNAL MEDICINE

## 2021-09-29 ENCOUNTER — HOSPITAL ENCOUNTER (EMERGENCY)
Facility: HOSPITAL | Age: 86
Discharge: HOME | End: 2021-09-29
Attending: STUDENT IN AN ORGANIZED HEALTH CARE EDUCATION/TRAINING PROGRAM
Payer: MEDICARE

## 2021-09-29 VITALS
HEIGHT: 64 IN | OXYGEN SATURATION: 94 % | RESPIRATION RATE: 21 BRPM | WEIGHT: 152.69 LBS | HEART RATE: 60 BPM | SYSTOLIC BLOOD PRESSURE: 166 MMHG | BODY MASS INDEX: 26.07 KG/M2 | DIASTOLIC BLOOD PRESSURE: 91 MMHG | TEMPERATURE: 99 F

## 2021-09-29 DIAGNOSIS — I71.20 THORACIC AORTIC ANEURYSM WITHOUT RUPTURE: ICD-10-CM

## 2021-09-29 DIAGNOSIS — E87.5 HYPERKALEMIA: ICD-10-CM

## 2021-09-29 DIAGNOSIS — R07.9 CHEST PAIN: Primary | ICD-10-CM

## 2021-09-29 DIAGNOSIS — N30.00 ACUTE CYSTITIS WITHOUT HEMATURIA: ICD-10-CM

## 2021-09-29 LAB
ANION GAP SERPL CALC-SCNC: 10 MMOL/L (ref 8–16)
BACTERIA #/AREA URNS HPF: ABNORMAL /HPF
BASOPHILS # BLD AUTO: 0.04 K/UL (ref 0–0.2)
BASOPHILS NFR BLD: 0.8 % (ref 0–1.9)
BILIRUB UR QL STRIP: NEGATIVE
BNP SERPL-MCNC: 123 PG/ML (ref 0–99)
BUN SERPL-MCNC: 17 MG/DL (ref 10–30)
CALCIUM SERPL-MCNC: 9.3 MG/DL (ref 8.7–10.5)
CHLORIDE SERPL-SCNC: 103 MMOL/L (ref 95–110)
CLARITY UR: CLEAR
CO2 SERPL-SCNC: 27 MMOL/L (ref 23–29)
COLOR UR: YELLOW
CREAT SERPL-MCNC: 1.3 MG/DL (ref 0.5–1.4)
DIFFERENTIAL METHOD: ABNORMAL
EOSINOPHIL # BLD AUTO: 0.5 K/UL (ref 0–0.5)
EOSINOPHIL NFR BLD: 10.7 % (ref 0–8)
ERYTHROCYTE [DISTWIDTH] IN BLOOD BY AUTOMATED COUNT: 15.8 % (ref 11.5–14.5)
EST. GFR  (AFRICAN AMERICAN): 40 ML/MIN/1.73 M^2
EST. GFR  (NON AFRICAN AMERICAN): 35 ML/MIN/1.73 M^2
GLUCOSE SERPL-MCNC: 105 MG/DL (ref 70–110)
GLUCOSE UR QL STRIP: NEGATIVE
HCT VFR BLD AUTO: 41.7 % (ref 37–48.5)
HGB BLD-MCNC: 13.4 G/DL (ref 12–16)
HGB UR QL STRIP: ABNORMAL
IMM GRANULOCYTES # BLD AUTO: 0.02 K/UL (ref 0–0.04)
IMM GRANULOCYTES NFR BLD AUTO: 0.4 % (ref 0–0.5)
KETONES UR QL STRIP: NEGATIVE
LEUKOCYTE ESTERASE UR QL STRIP: ABNORMAL
LYMPHOCYTES # BLD AUTO: 2.1 K/UL (ref 1–4.8)
LYMPHOCYTES NFR BLD: 43.2 % (ref 18–48)
MCH RBC QN AUTO: 28.1 PG (ref 27–31)
MCHC RBC AUTO-ENTMCNC: 32.1 G/DL (ref 32–36)
MCV RBC AUTO: 87 FL (ref 82–98)
MICROSCOPIC COMMENT: ABNORMAL
MONOCYTES # BLD AUTO: 0.4 K/UL (ref 0.3–1)
MONOCYTES NFR BLD: 7.5 % (ref 4–15)
NEUTROPHILS # BLD AUTO: 1.8 K/UL (ref 1.8–7.7)
NEUTROPHILS NFR BLD: 37.4 % (ref 38–73)
NITRITE UR QL STRIP: NEGATIVE
NRBC BLD-RTO: 0 /100 WBC
PH UR STRIP: 7 [PH] (ref 5–8)
PLATELET # BLD AUTO: 169 K/UL (ref 150–450)
PMV BLD AUTO: 10.4 FL (ref 9.2–12.9)
POTASSIUM SERPL-SCNC: 5.7 MMOL/L (ref 3.5–5.1)
PROT UR QL STRIP: NEGATIVE
RBC # BLD AUTO: 4.77 M/UL (ref 4–5.4)
RBC #/AREA URNS HPF: 10 /HPF (ref 0–4)
SARS-COV-2 RDRP RESP QL NAA+PROBE: NEGATIVE
SODIUM SERPL-SCNC: 140 MMOL/L (ref 136–145)
SP GR UR STRIP: 1.01 (ref 1–1.03)
TROPONIN I SERPL DL<=0.01 NG/ML-MCNC: 0.01 NG/ML (ref 0–0.03)
TROPONIN I SERPL DL<=0.01 NG/ML-MCNC: 0.01 NG/ML (ref 0–0.03)
URN SPEC COLLECT METH UR: ABNORMAL
UROBILINOGEN UR STRIP-ACNC: NEGATIVE EU/DL
WBC # BLD AUTO: 4.77 K/UL (ref 3.9–12.7)
WBC #/AREA URNS HPF: >100 /HPF (ref 0–5)

## 2021-09-29 PROCEDURE — 36415 COLL VENOUS BLD VENIPUNCTURE: CPT | Performed by: STUDENT IN AN ORGANIZED HEALTH CARE EDUCATION/TRAINING PROGRAM

## 2021-09-29 PROCEDURE — 93010 EKG 12-LEAD: ICD-10-PCS | Mod: ,,, | Performed by: INTERNAL MEDICINE

## 2021-09-29 PROCEDURE — 84484 ASSAY OF TROPONIN QUANT: CPT | Mod: 91 | Performed by: STUDENT IN AN ORGANIZED HEALTH CARE EDUCATION/TRAINING PROGRAM

## 2021-09-29 PROCEDURE — 99285 EMERGENCY DEPT VISIT HI MDM: CPT | Mod: 25

## 2021-09-29 PROCEDURE — U0002 COVID-19 LAB TEST NON-CDC: HCPCS | Performed by: NURSE PRACTITIONER

## 2021-09-29 PROCEDURE — 85025 COMPLETE CBC W/AUTO DIFF WBC: CPT | Performed by: STUDENT IN AN ORGANIZED HEALTH CARE EDUCATION/TRAINING PROGRAM

## 2021-09-29 PROCEDURE — 25000003 PHARM REV CODE 250: Performed by: NURSE PRACTITIONER

## 2021-09-29 PROCEDURE — 80048 BASIC METABOLIC PNL TOTAL CA: CPT | Performed by: STUDENT IN AN ORGANIZED HEALTH CARE EDUCATION/TRAINING PROGRAM

## 2021-09-29 PROCEDURE — 25000003 PHARM REV CODE 250: Performed by: STUDENT IN AN ORGANIZED HEALTH CARE EDUCATION/TRAINING PROGRAM

## 2021-09-29 PROCEDURE — 83880 ASSAY OF NATRIURETIC PEPTIDE: CPT | Performed by: STUDENT IN AN ORGANIZED HEALTH CARE EDUCATION/TRAINING PROGRAM

## 2021-09-29 PROCEDURE — 96365 THER/PROPH/DIAG IV INF INIT: CPT | Mod: 59

## 2021-09-29 PROCEDURE — 93005 ELECTROCARDIOGRAM TRACING: CPT

## 2021-09-29 PROCEDURE — 81000 URINALYSIS NONAUTO W/SCOPE: CPT | Performed by: NURSE PRACTITIONER

## 2021-09-29 PROCEDURE — 87086 URINE CULTURE/COLONY COUNT: CPT | Performed by: NURSE PRACTITIONER

## 2021-09-29 PROCEDURE — 63600175 PHARM REV CODE 636 W HCPCS: Performed by: NURSE PRACTITIONER

## 2021-09-29 PROCEDURE — 25500020 PHARM REV CODE 255: Performed by: STUDENT IN AN ORGANIZED HEALTH CARE EDUCATION/TRAINING PROGRAM

## 2021-09-29 PROCEDURE — 96361 HYDRATE IV INFUSION ADD-ON: CPT

## 2021-09-29 PROCEDURE — 93010 ELECTROCARDIOGRAM REPORT: CPT | Mod: ,,, | Performed by: INTERNAL MEDICINE

## 2021-09-29 RX ORDER — AMOXICILLIN AND CLAVULANATE POTASSIUM 500; 125 MG/1; MG/1
1 TABLET, FILM COATED ORAL 2 TIMES DAILY
Qty: 14 TABLET | Refills: 0 | Status: SHIPPED | OUTPATIENT
Start: 2021-09-29 | End: 2021-10-06

## 2021-09-29 RX ORDER — SODIUM CHLORIDE 9 MG/ML
INJECTION, SOLUTION INTRAVENOUS
Status: COMPLETED | OUTPATIENT
Start: 2021-09-29 | End: 2021-09-29

## 2021-09-29 RX ORDER — ASPIRIN 325 MG
325 TABLET ORAL
Status: COMPLETED | OUTPATIENT
Start: 2021-09-29 | End: 2021-09-29

## 2021-09-29 RX ORDER — SODIUM CHLORIDE 0.9 % (FLUSH) 0.9 %
10 SYRINGE (ML) INJECTION
Status: DISCONTINUED | OUTPATIENT
Start: 2021-09-29 | End: 2021-09-29 | Stop reason: HOSPADM

## 2021-09-29 RX ORDER — ONDANSETRON 2 MG/ML
4 INJECTION INTRAMUSCULAR; INTRAVENOUS EVERY 4 HOURS PRN
Status: DISCONTINUED | OUTPATIENT
Start: 2021-09-29 | End: 2021-09-29 | Stop reason: HOSPADM

## 2021-09-29 RX ADMIN — ASPIRIN 325 MG: 325 TABLET ORAL at 11:09

## 2021-09-29 RX ADMIN — IOHEXOL 75 ML: 350 INJECTION, SOLUTION INTRAVENOUS at 01:09

## 2021-09-29 RX ADMIN — SODIUM CHLORIDE: 0.9 INJECTION, SOLUTION INTRAVENOUS at 12:09

## 2021-09-29 RX ADMIN — CEFTRIAXONE 1 G: 1 INJECTION, SOLUTION INTRAVENOUS at 02:09

## 2021-09-30 ENCOUNTER — EXTERNAL CHRONIC CARE MANAGEMENT (OUTPATIENT)
Dept: PRIMARY CARE CLINIC | Facility: CLINIC | Age: 86
End: 2021-09-30
Payer: MEDICARE

## 2021-09-30 PROCEDURE — 99490 CHRNC CARE MGMT STAFF 1ST 20: CPT | Mod: PBBFAC | Performed by: INTERNAL MEDICINE

## 2021-09-30 PROCEDURE — 99999 PR STA SHADOW: CPT | Mod: PBBFAC,,, | Performed by: INTERNAL MEDICINE

## 2021-09-30 PROCEDURE — 99999 PR STA SHADOW: ICD-10-PCS | Mod: PBBFAC,,, | Performed by: INTERNAL MEDICINE

## 2021-10-01 LAB
BACTERIA UR CULT: NORMAL
BACTERIA UR CULT: NORMAL

## 2022-03-06 ENCOUNTER — HOSPITAL ENCOUNTER (EMERGENCY)
Facility: HOSPITAL | Age: 87
Discharge: HOME | End: 2022-03-06
Attending: STUDENT IN AN ORGANIZED HEALTH CARE EDUCATION/TRAINING PROGRAM
Payer: MEDICARE

## 2022-03-06 VITALS
TEMPERATURE: 97 F | DIASTOLIC BLOOD PRESSURE: 80 MMHG | OXYGEN SATURATION: 98 % | HEART RATE: 62 BPM | WEIGHT: 150.81 LBS | RESPIRATION RATE: 18 BRPM | SYSTOLIC BLOOD PRESSURE: 168 MMHG | BODY MASS INDEX: 25.88 KG/M2

## 2022-03-06 DIAGNOSIS — J44.1 COPD EXACERBATION: Primary | ICD-10-CM

## 2022-03-06 DIAGNOSIS — R06.02 SOB (SHORTNESS OF BREATH): ICD-10-CM

## 2022-03-06 LAB
ALBUMIN SERPL BCP-MCNC: 4 G/DL (ref 3.5–5.2)
ALP SERPL-CCNC: 61 U/L (ref 55–135)
ALT SERPL W/O P-5'-P-CCNC: 19 U/L (ref 10–44)
ANION GAP SERPL CALC-SCNC: 11 MMOL/L (ref 8–16)
AST SERPL-CCNC: 24 U/L (ref 10–40)
BASOPHILS # BLD AUTO: 0.03 K/UL (ref 0–0.2)
BASOPHILS NFR BLD: 0.5 % (ref 0–1.9)
BILIRUB SERPL-MCNC: 2.3 MG/DL (ref 0.1–1)
BNP SERPL-MCNC: 517 PG/ML (ref 0–99)
BUN SERPL-MCNC: 20 MG/DL (ref 10–30)
CALCIUM SERPL-MCNC: 9.8 MG/DL (ref 8.7–10.5)
CHLORIDE SERPL-SCNC: 101 MMOL/L (ref 95–110)
CO2 SERPL-SCNC: 29 MMOL/L (ref 23–29)
CREAT SERPL-MCNC: 1.3 MG/DL (ref 0.5–1.4)
DIFFERENTIAL METHOD: ABNORMAL
EOSINOPHIL # BLD AUTO: 0.7 K/UL (ref 0–0.5)
EOSINOPHIL NFR BLD: 12.6 % (ref 0–8)
ERYTHROCYTE [DISTWIDTH] IN BLOOD BY AUTOMATED COUNT: 15.4 % (ref 11.5–14.5)
EST. GFR  (AFRICAN AMERICAN): 40 ML/MIN/1.73 M^2
EST. GFR  (NON AFRICAN AMERICAN): 35 ML/MIN/1.73 M^2
GLUCOSE SERPL-MCNC: 113 MG/DL (ref 70–110)
HCT VFR BLD AUTO: 43.5 % (ref 37–48.5)
HGB BLD-MCNC: 13.7 G/DL (ref 12–16)
IMM GRANULOCYTES # BLD AUTO: 0.01 K/UL (ref 0–0.04)
IMM GRANULOCYTES NFR BLD AUTO: 0.2 % (ref 0–0.5)
INFLUENZA A, MOLECULAR: NEGATIVE
INFLUENZA B, MOLECULAR: NEGATIVE
LYMPHOCYTES # BLD AUTO: 1.8 K/UL (ref 1–4.8)
LYMPHOCYTES NFR BLD: 32.3 % (ref 18–48)
MCH RBC QN AUTO: 27.9 PG (ref 27–31)
MCHC RBC AUTO-ENTMCNC: 31.5 G/DL (ref 32–36)
MCV RBC AUTO: 89 FL (ref 82–98)
MONOCYTES # BLD AUTO: 0.4 K/UL (ref 0.3–1)
MONOCYTES NFR BLD: 6.6 % (ref 4–15)
NEUTROPHILS # BLD AUTO: 2.7 K/UL (ref 1.8–7.7)
NEUTROPHILS NFR BLD: 47.8 % (ref 38–73)
NRBC BLD-RTO: 0 /100 WBC
PLATELET # BLD AUTO: 182 K/UL (ref 150–450)
PMV BLD AUTO: 10.1 FL (ref 9.2–12.9)
POTASSIUM SERPL-SCNC: 3.9 MMOL/L (ref 3.5–5.1)
PROT SERPL-MCNC: 7.9 G/DL (ref 6–8.4)
RBC # BLD AUTO: 4.91 M/UL (ref 4–5.4)
SARS-COV-2 RDRP RESP QL NAA+PROBE: NEGATIVE
SODIUM SERPL-SCNC: 141 MMOL/L (ref 136–145)
SPECIMEN SOURCE: NORMAL
TROPONIN I SERPL DL<=0.01 NG/ML-MCNC: <0.006 NG/ML (ref 0–0.03)
WBC # BLD AUTO: 5.63 K/UL (ref 3.9–12.7)

## 2022-03-06 PROCEDURE — 85025 COMPLETE CBC W/AUTO DIFF WBC: CPT | Performed by: STUDENT IN AN ORGANIZED HEALTH CARE EDUCATION/TRAINING PROGRAM

## 2022-03-06 PROCEDURE — 93010 EKG 12-LEAD: ICD-10-PCS | Mod: ,,, | Performed by: INTERNAL MEDICINE

## 2022-03-06 PROCEDURE — 36415 COLL VENOUS BLD VENIPUNCTURE: CPT | Performed by: STUDENT IN AN ORGANIZED HEALTH CARE EDUCATION/TRAINING PROGRAM

## 2022-03-06 PROCEDURE — 99285 EMERGENCY DEPT VISIT HI MDM: CPT | Mod: 25

## 2022-03-06 PROCEDURE — 80053 COMPREHEN METABOLIC PANEL: CPT | Performed by: STUDENT IN AN ORGANIZED HEALTH CARE EDUCATION/TRAINING PROGRAM

## 2022-03-06 PROCEDURE — 25000003 PHARM REV CODE 250: Performed by: STUDENT IN AN ORGANIZED HEALTH CARE EDUCATION/TRAINING PROGRAM

## 2022-03-06 PROCEDURE — 87502 INFLUENZA DNA AMP PROBE: CPT | Performed by: STUDENT IN AN ORGANIZED HEALTH CARE EDUCATION/TRAINING PROGRAM

## 2022-03-06 PROCEDURE — 94640 AIRWAY INHALATION TREATMENT: CPT

## 2022-03-06 PROCEDURE — 99900035 HC TECH TIME PER 15 MIN (STAT)

## 2022-03-06 PROCEDURE — 83880 ASSAY OF NATRIURETIC PEPTIDE: CPT | Performed by: STUDENT IN AN ORGANIZED HEALTH CARE EDUCATION/TRAINING PROGRAM

## 2022-03-06 PROCEDURE — U0002 COVID-19 LAB TEST NON-CDC: HCPCS | Performed by: STUDENT IN AN ORGANIZED HEALTH CARE EDUCATION/TRAINING PROGRAM

## 2022-03-06 PROCEDURE — 84484 ASSAY OF TROPONIN QUANT: CPT | Performed by: STUDENT IN AN ORGANIZED HEALTH CARE EDUCATION/TRAINING PROGRAM

## 2022-03-06 PROCEDURE — 96372 THER/PROPH/DIAG INJ SC/IM: CPT

## 2022-03-06 PROCEDURE — 63600175 PHARM REV CODE 636 W HCPCS: Performed by: STUDENT IN AN ORGANIZED HEALTH CARE EDUCATION/TRAINING PROGRAM

## 2022-03-06 PROCEDURE — 25000242 PHARM REV CODE 250 ALT 637 W/ HCPCS: Performed by: STUDENT IN AN ORGANIZED HEALTH CARE EDUCATION/TRAINING PROGRAM

## 2022-03-06 PROCEDURE — 93010 ELECTROCARDIOGRAM REPORT: CPT | Mod: ,,, | Performed by: INTERNAL MEDICINE

## 2022-03-06 PROCEDURE — 93005 ELECTROCARDIOGRAM TRACING: CPT

## 2022-03-06 RX ORDER — METHYLPREDNISOLONE SOD SUCC 125 MG
125 VIAL (EA) INJECTION
Status: COMPLETED | OUTPATIENT
Start: 2022-03-06 | End: 2022-03-06

## 2022-03-06 RX ORDER — ALBUTEROL SULFATE 90 UG/1
1-2 AEROSOL, METERED RESPIRATORY (INHALATION) EVERY 6 HOURS PRN
Qty: 1 G | Refills: 0 | Status: SHIPPED | OUTPATIENT
Start: 2022-03-06

## 2022-03-06 RX ORDER — IPRATROPIUM BROMIDE AND ALBUTEROL SULFATE 2.5; .5 MG/3ML; MG/3ML
3 SOLUTION RESPIRATORY (INHALATION)
Status: COMPLETED | OUTPATIENT
Start: 2022-03-06 | End: 2022-03-06

## 2022-03-06 RX ORDER — FUROSEMIDE 10 MG/ML
40 INJECTION INTRAMUSCULAR; INTRAVENOUS
Status: DISCONTINUED | OUTPATIENT
Start: 2022-03-06 | End: 2022-03-06 | Stop reason: ALTCHOICE

## 2022-03-06 RX ORDER — AMOXICILLIN 500 MG/1
1000 CAPSULE ORAL
Status: COMPLETED | OUTPATIENT
Start: 2022-03-06 | End: 2022-03-06

## 2022-03-06 RX ORDER — ACETAMINOPHEN 325 MG/1
650 TABLET ORAL
Status: COMPLETED | OUTPATIENT
Start: 2022-03-06 | End: 2022-03-06

## 2022-03-06 RX ORDER — PREDNISONE 20 MG/1
40 TABLET ORAL DAILY
Qty: 10 TABLET | Refills: 0 | Status: SHIPPED | OUTPATIENT
Start: 2022-03-06 | End: 2022-03-11

## 2022-03-06 RX ORDER — FUROSEMIDE 40 MG/1
40 TABLET ORAL
Status: DISCONTINUED | OUTPATIENT
Start: 2022-03-06 | End: 2022-03-06

## 2022-03-06 RX ADMIN — METHYLPREDNISOLONE SODIUM SUCCINATE 125 MG: 125 INJECTION, POWDER, FOR SOLUTION INTRAMUSCULAR; INTRAVENOUS at 09:03

## 2022-03-06 RX ADMIN — IPRATROPIUM BROMIDE AND ALBUTEROL SULFATE 3 ML: 2.5; .5 SOLUTION RESPIRATORY (INHALATION) at 10:03

## 2022-03-06 RX ADMIN — ACETAMINOPHEN 650 MG: 325 TABLET ORAL at 09:03

## 2022-03-06 RX ADMIN — AMOXICILLIN 1000 MG: 500 CAPSULE ORAL at 09:03

## 2022-03-06 NOTE — DISCHARGE INSTRUCTIONS
Please follow up with your primary care physician within 2 days. Ensure that you review all lab work results and/or imaging results. If you have any questions about your discharge paperwork please call the Emergency Department.     Return to the ED for any chest pain, upper abdominal pain, shortness of breath, lightheadedness, or any new or worsening symptoms.     Thank you for visiting [facility], Department of Emergency Medicine. Please see the entirety of the educational materials provided. Please note that a visit to the emergency department does not substitute ongoing care from a primary medical provider or specialist. Please ensure to follow up as recommended. However, please return to the emergency department immediately if symptoms do not improve as discussed, symptoms worsen, new symptoms develop, difficulty in following up or for any of your concerns or issues. Please note on discharge you are acknowledging understanding and agreement on medical evaluation, management recommendations and follow up recommendations.

## 2022-03-06 NOTE — ED PROVIDER NOTES
Encounter Date: 3/6/2022       History     Chief Complaint   Patient presents with    Shortness of Breath     89-year-old female with history of AFib with a pacemaker, diabetes, hypertension, presenting with shortness of breath since yesterday.  Patient denies any fever, however does report congestion, and cough since yesterday.  Denies any chest pain.  No other complaints, including no abdominal pain, nausea, vomiting, diarrhea, urinary symptoms.  Patient has a significant smoking history, denies any history of COPD.  No leg swelling.        Review of patient's allergies indicates:  No Known Allergies  Past Medical History:   Diagnosis Date    A-fib     Arthritis     Diabetes mellitus     Digestive disorder     ACID REFLUX    GAVIN (dyspnea on exertion)     Encounter for blood transfusion     70 YEARS AGO PRIOR TO CHANGING TO     Gout     Hypertension     On supplemental oxygen by nasal cannula     Shingles     Vertigo      Past Surgical History:   Procedure Laterality Date    A-V CARDIAC PACEMAKER INSERTION N/A 2/24/2021    Procedure: INSERTION, MEDTRONIC CARDIAC PACEMAKER, DUAL CHAMBER;  Surgeon: Gustavo Roca MD;  Location: [facility] CATH;  Service: Cardiology;  Laterality: N/A;    CHOLECYSTECTOMY      CYSTOSCOPY W/ RETROGRADES Bilateral 7/16/2021    Procedure: CYSTOSCOPY, WITH RETROGRADE PYELOGRAM;  Surgeon: Tommy Brewer Jr., MD;  Location: [facility] OR [address] [place] [address];  Service: Urology;  Laterality: Bilateral;  1hr    HERNIA REPAIR      HYSTERECTOMY       Family History   Problem Relation Age of Onset    Cancer Mother      Social History     Tobacco Use    Smoking status: Former Smoker     Years: 20.00     Types: Cigarettes    Smokeless tobacco: Never Used   Substance Use Topics    Alcohol use: Yes     Comment: occasioly    Drug use: No     Review of Systems   Constitutional: Negative for fever.   HENT: Positive for congestion.    Respiratory: Positive for cough and shortness of breath.     Cardiovascular: Negative for chest pain.   Gastrointestinal: Negative for abdominal pain, diarrhea, nausea and vomiting.   Genitourinary: Negative for dysuria and flank pain.   Musculoskeletal: Negative for back pain and myalgias.   Skin: Negative for rash.   Neurological: Negative for weakness.   Hematological: Does not bruise/bleed easily.       Physical Exam     Initial Vitals [03/06/22 0921]   BP Pulse Resp Temp SpO2   (!) 169/81 68 20 96.8 °F (36 °C) 95 %      MAP       --         Physical Exam    Nursing note and vitals reviewed.  Constitutional: She appears well-developed. She is not diaphoretic. No distress.   HENT:   Head: Normocephalic.   Eyes: Pupils are equal, round, and reactive to light.   Neck:   Normal range of motion.  Cardiovascular:   No murmur heard.  Pulmonary/Chest: No respiratory distress.   Diffuse wheezing in upper and lower lung fields bilaterally.  No crackles.  Reasonable air movement.  No increased work of breathing, accessory muscle use, or belly breathing.  Patient speaking in full sentences.   Abdominal: Abdomen is soft. She exhibits no distension. There is no abdominal tenderness. There is no rebound.   Musculoskeletal:         General: No edema.      Cervical back: Normal range of motion.     Neurological: She is alert.   Skin: Skin is warm.   Psychiatric: She has a normal mood and affect.         ED Course   Procedures  Labs Reviewed   CBC W/ AUTO DIFFERENTIAL - Abnormal; Notable for the following components:       Result Value    MCHC 31.5 (*)     RDW 15.4 (*)     Eos # 0.7 (*)     Eosinophil % 12.6 (*)     All other components within normal limits   COMPREHENSIVE METABOLIC PANEL - Abnormal; Notable for the following components:    Glucose 113 (*)     Total Bilirubin 2.3 (*)     eGFR if  40 (*)     eGFR if non  35 (*)     All other components within normal limits   B-TYPE NATRIURETIC PEPTIDE - Abnormal; Notable for the following components:    BNP  517 (*)     All other components within normal limits   INFLUENZA A & B BY MOLECULAR   TROPONIN I   SARS-COV-2 RNA AMPLIFICATION, QUAL          Imaging Results          X-Ray Chest AP Portable (Final result)  Result time 03/06/22 10:26:45    Final result by Gustavo Razo MD (03/06/22 10:26:45)                 Impression:      1. Mild chronic interstitial change without focal consolidation  2. Cardiomegaly.  3. Pacemaker.      Electronically signed by: Gustavo Razo  Date:    03/06/2022  Time:    10:26             Narrative:    EXAMINATION:  XR CHEST AP PORTABLE    CLINICAL HISTORY:  sob;    TECHNIQUE:  Single frontal view of the chest was performed.    COMPARISON:  09/29/2021.    FINDINGS:  There is mild chronic interstitial change.  Minimal dependent atelectasis at the right lung base.  No focal consolidation    Heart size is globally enlarged.  Calcified aortic plaque.  Trachea midline.    Bony thorax intact with demineralization.  Left axillary pacemaker in place.                                 Medications   methylPREDNISolone sodium succinate injection 125 mg (125 mg Intramuscular Given 3/6/22 0942)   acetaminophen tablet 650 mg (650 mg Oral Given 3/6/22 0942)   amoxicillin capsule 1,000 mg (1,000 mg Oral Given 3/6/22 0949)   albuterol-ipratropium 2.5 mg-0.5 mg/3 mL nebulizer solution 3 mL (3 mLs Nebulization Given 3/6/22 1033)     Medical Decision Making:   Differential Diagnosis:   DDX: Likely COPD exacerbation given history, exam. R/o PNA. Unlikely PE given history, physical, risk factor analysis, +other more likely diagnosis. Low suspicion ACS but will screen given risk factors.  Will also screen for heart failure given age.  DX:  CBC, CMP, troponin, BNP, chest x-ray, EKG.   TX: Analgesia PRN. Albuterol/atrovent nebs PRN. Steroids. Antibiotics if indicated by CXR or will be admitted. Treatment/consult as indicated by studies.  Dispo: Pending studies. If symptoms controlled, studies WNL or stable for  outpatient management, discharge to follow up with PMD within 3-5 days with steroid course and albuterol PRN, precautions for return, and recommendations for supportive care. If no improvement in respiratory distress with appropriate observation in ED, consider observation vs. admission for acute asthma exacerbation.               ED Course as of 03/06/22 1052   Sun Mar 06, 2022   0931 Patient and grandson both informed that the steroid will increase her blood sugar, and that they should be vigilant about checking her blood glucose for the next few days, and should follow-up with her PCP, or return to the emergency department if it significantly elevated. [NB]   0937 Patient now complaining of left ear pain.  Left TM shows erythema around the tympanic membrane, concern for possible otitis media.  Will cover with amoxicillin.  Give Tylenol for pain control.  Right TM unremarkable. [NB]   1008 Troponin I: <0.006 [NB]   1049 Patient reports resolution of symptoms.  Vital stable, satting 96% on room air, pulse of 70. Lab work is all within normal limits, patient informed of elevated BNP, and will denies cardiology follow-up.   [NB]      ED Course User Index  [NB] Desmond Carmona MD             Clinical Impression:   Final diagnoses:  [R06.02] SOB (shortness of breath)                 Desmond Carmona MD  03/06/22 0931       Desmond Carmona MD  03/06/22 0938       Desmond Carmona MD  03/06/22 1052

## 2022-04-25 ENCOUNTER — HOSPITAL ENCOUNTER (EMERGENCY)
Facility: HOSPITAL | Age: 87
Discharge: HOME | End: 2022-04-25
Attending: SURGERY
Payer: MEDICARE

## 2022-04-25 VITALS
BODY MASS INDEX: 25.39 KG/M2 | DIASTOLIC BLOOD PRESSURE: 84 MMHG | SYSTOLIC BLOOD PRESSURE: 142 MMHG | TEMPERATURE: 98 F | OXYGEN SATURATION: 93 % | RESPIRATION RATE: 16 BRPM | HEART RATE: 60 BPM | WEIGHT: 147.88 LBS

## 2022-04-25 DIAGNOSIS — R06.02 SOB (SHORTNESS OF BREATH): ICD-10-CM

## 2022-04-25 LAB
ALBUMIN SERPL BCP-MCNC: 3.7 G/DL (ref 3.5–5.2)
ALP SERPL-CCNC: 66 U/L (ref 55–135)
ALT SERPL W/O P-5'-P-CCNC: 20 U/L (ref 10–44)
ANION GAP SERPL CALC-SCNC: 10 MMOL/L (ref 8–16)
AST SERPL-CCNC: 19 U/L (ref 10–40)
BACTERIA #/AREA URNS HPF: NORMAL /HPF
BASOPHILS # BLD AUTO: 0.05 K/UL (ref 0–0.2)
BASOPHILS NFR BLD: 1.1 % (ref 0–1.9)
BILIRUB SERPL-MCNC: 1.1 MG/DL (ref 0.1–1)
BILIRUB UR QL STRIP: NEGATIVE
BNP SERPL-MCNC: 375 PG/ML (ref 0–99)
BUN SERPL-MCNC: 28 MG/DL (ref 10–30)
CALCIUM SERPL-MCNC: 8.9 MG/DL (ref 8.7–10.5)
CHLORIDE SERPL-SCNC: 103 MMOL/L (ref 95–110)
CK MB SERPL-MCNC: 1.1 NG/ML (ref 0.1–6.5)
CK MB SERPL-MCNC: 1.2 NG/ML (ref 0.1–6.5)
CK MB SERPL-RTO: 0.9 % (ref 0–5)
CK MB SERPL-RTO: 0.9 % (ref 0–5)
CK SERPL-CCNC: 122 U/L (ref 20–180)
CK SERPL-CCNC: 122 U/L (ref 20–180)
CK SERPL-CCNC: 127 U/L (ref 20–180)
CK SERPL-CCNC: 127 U/L (ref 20–180)
CLARITY UR: CLEAR
CO2 SERPL-SCNC: 28 MMOL/L (ref 23–29)
COLOR UR: YELLOW
CREAT SERPL-MCNC: 1.3 MG/DL (ref 0.5–1.4)
D DIMER PPP IA.FEU-MCNC: 1.46 MG/L FEU
DIFFERENTIAL METHOD: ABNORMAL
EOSINOPHIL # BLD AUTO: 0.7 K/UL (ref 0–0.5)
EOSINOPHIL NFR BLD: 15.8 % (ref 0–8)
ERYTHROCYTE [DISTWIDTH] IN BLOOD BY AUTOMATED COUNT: 16.3 % (ref 11.5–14.5)
EST. GFR  (AFRICAN AMERICAN): 40 ML/MIN/1.73 M^2
EST. GFR  (NON AFRICAN AMERICAN): 35 ML/MIN/1.73 M^2
GLUCOSE SERPL-MCNC: 100 MG/DL (ref 70–110)
GLUCOSE UR QL STRIP: NEGATIVE
HCT VFR BLD AUTO: 41.8 % (ref 37–48.5)
HGB BLD-MCNC: 13.1 G/DL (ref 12–16)
HGB UR QL STRIP: ABNORMAL
IMM GRANULOCYTES # BLD AUTO: 0.02 K/UL (ref 0–0.04)
IMM GRANULOCYTES NFR BLD AUTO: 0.4 % (ref 0–0.5)
KETONES UR QL STRIP: NEGATIVE
LEUKOCYTE ESTERASE UR QL STRIP: ABNORMAL
LYMPHOCYTES # BLD AUTO: 2 K/UL (ref 1–4.8)
LYMPHOCYTES NFR BLD: 44 % (ref 18–48)
MCH RBC QN AUTO: 27.9 PG (ref 27–31)
MCHC RBC AUTO-ENTMCNC: 31.3 G/DL (ref 32–36)
MCV RBC AUTO: 89 FL (ref 82–98)
MICROSCOPIC COMMENT: NORMAL
MONOCYTES # BLD AUTO: 0.4 K/UL (ref 0.3–1)
MONOCYTES NFR BLD: 8.6 % (ref 4–15)
NEUTROPHILS # BLD AUTO: 1.4 K/UL (ref 1.8–7.7)
NEUTROPHILS NFR BLD: 30.1 % (ref 38–73)
NITRITE UR QL STRIP: NEGATIVE
NRBC BLD-RTO: 0 /100 WBC
PH UR STRIP: 7 [PH] (ref 5–8)
PLATELET # BLD AUTO: 194 K/UL (ref 150–450)
PMV BLD AUTO: 9.8 FL (ref 9.2–12.9)
POCT GLUCOSE: 94 MG/DL (ref 70–110)
POTASSIUM SERPL-SCNC: 4.6 MMOL/L (ref 3.5–5.1)
PROT SERPL-MCNC: 7.2 G/DL (ref 6–8.4)
PROT UR QL STRIP: NEGATIVE
RBC # BLD AUTO: 4.7 M/UL (ref 4–5.4)
RBC #/AREA URNS HPF: 3 /HPF (ref 0–4)
SODIUM SERPL-SCNC: 141 MMOL/L (ref 136–145)
SP GR UR STRIP: 1.01 (ref 1–1.03)
SQUAMOUS #/AREA URNS HPF: 0 /HPF
TROPONIN I SERPL DL<=0.01 NG/ML-MCNC: <0.006 NG/ML (ref 0–0.03)
TROPONIN I SERPL DL<=0.01 NG/ML-MCNC: <0.006 NG/ML (ref 0–0.03)
URN SPEC COLLECT METH UR: ABNORMAL
UROBILINOGEN UR STRIP-ACNC: NEGATIVE EU/DL
WBC # BLD AUTO: 4.55 K/UL (ref 3.9–12.7)
WBC #/AREA URNS HPF: 3 /HPF (ref 0–5)

## 2022-04-25 PROCEDURE — 93005 ELECTROCARDIOGRAM TRACING: CPT

## 2022-04-25 PROCEDURE — 82962 GLUCOSE BLOOD TEST: CPT

## 2022-04-25 PROCEDURE — 83880 ASSAY OF NATRIURETIC PEPTIDE: CPT | Performed by: SURGERY

## 2022-04-25 PROCEDURE — 81000 URINALYSIS NONAUTO W/SCOPE: CPT | Performed by: SURGERY

## 2022-04-25 PROCEDURE — 93010 ELECTROCARDIOGRAM REPORT: CPT | Mod: ,,, | Performed by: INTERNAL MEDICINE

## 2022-04-25 PROCEDURE — 85025 COMPLETE CBC W/AUTO DIFF WBC: CPT | Performed by: SURGERY

## 2022-04-25 PROCEDURE — 93010 EKG 12-LEAD: ICD-10-PCS | Mod: ,,, | Performed by: INTERNAL MEDICINE

## 2022-04-25 PROCEDURE — 82553 CREATINE MB FRACTION: CPT | Performed by: SURGERY

## 2022-04-25 PROCEDURE — 84484 ASSAY OF TROPONIN QUANT: CPT | Performed by: SURGERY

## 2022-04-25 PROCEDURE — 36415 COLL VENOUS BLD VENIPUNCTURE: CPT | Performed by: SURGERY

## 2022-04-25 PROCEDURE — 85379 FIBRIN DEGRADATION QUANT: CPT | Performed by: SURGERY

## 2022-04-25 PROCEDURE — 99285 EMERGENCY DEPT VISIT HI MDM: CPT | Mod: 25

## 2022-04-25 PROCEDURE — 80053 COMPREHEN METABOLIC PANEL: CPT | Performed by: SURGERY

## 2022-04-25 NOTE — ED NOTES
89 y.o. female presents to ER ED 06/ED 06   Chief Complaint   Patient presents with    Shortness of Breath   Pt arrived to ED with caregiver c/o having SOB and elevated BS of 135 at home so she brought her to the ED. Pt state she has been dealing with a productive cough and congestion for a few weeks now and is only taking Tessalon Pearls at this time w/o relief. Denies fever at this time

## 2022-04-25 NOTE — ED PROVIDER NOTES
Encounter Date: 4/25/2022       History     Chief Complaint   Patient presents with    Shortness of Breath     Roseanne Marroquin is a 89 y.o. female presents with reported high blood sugar this morning  Patient also was reported to be breathing irregularly by the caregiver at bedside today  Patient has a normal respiratory pattern with clear lung sounds on evaluation this a.m.  Patient's blood sugar on evaluation/triage was 139, caregiver did not know what high was  Pt is alert appropriate hand actually has no complaints on initial interview this morning        Review of patient's allergies indicates:  No Known Allergies  Past Medical History:   Diagnosis Date    A-fib     Arthritis     Diabetes mellitus     Digestive disorder     ACID REFLUX    GAVIN (dyspnea on exertion)     Encounter for blood transfusion     70 YEARS AGO PRIOR TO CHANGING TO     Gout     Hypertension     On supplemental oxygen by nasal cannula     Shingles     Vertigo      Past Surgical History:   Procedure Laterality Date    A-V CARDIAC PACEMAKER INSERTION N/A 2/24/2021    Procedure: INSERTION, MEDTRONIC CARDIAC PACEMAKER, DUAL CHAMBER;  Surgeon: Gustavo Roca MD;  Location: [facility] CATH;  Service: Cardiology;  Laterality: N/A;    CHOLECYSTECTOMY      CYSTOSCOPY W/ RETROGRADES Bilateral 7/16/2021    Procedure: CYSTOSCOPY, WITH RETROGRADE PYELOGRAM;  Surgeon: Tommy Brewer Jr., MD;  Location: [facility] OR [address] [place] [address];  Service: Urology;  Laterality: Bilateral;  1hr    HERNIA REPAIR      HYSTERECTOMY       Family History   Problem Relation Age of Onset    Cancer Mother      Social History     Tobacco Use    Smoking status: Former Smoker     Years: 20.00     Types: Cigarettes    Smokeless tobacco: Never Used   Substance Use Topics    Alcohol use: Not Currently     Comment: occ    Drug use: No     Review of Systems   Constitutional: Negative.    HENT: Negative.    Eyes: Negative.    Respiratory: Positive for shortness of breath.     Cardiovascular: Negative.    Gastrointestinal: Negative.    Genitourinary: Negative.    Musculoskeletal: Negative.    Skin: Negative.    Neurological: Negative.    Psychiatric/Behavioral: Negative.      Physical Exam     Initial Vitals [04/25/22 1012]   BP Pulse Resp Temp SpO2   (!) 165/97 63 19 97.7 °F (36.5 °C) (!) 94 %      MAP       --         Physical Exam    Nursing note and vitals reviewed.  Constitutional: She appears well-developed and well-nourished.   HENT:   Head: Normocephalic.   Right Ear: External ear normal.   Left Ear: External ear normal.   Nose: Nose normal.   Mouth/Throat: Oropharynx is clear and moist.   Eyes: Conjunctivae and EOM are normal. Pupils are equal, round, and reactive to light.   Neck:   Normal range of motion.  Cardiovascular: Normal rate, regular rhythm, normal heart sounds and intact distal pulses.   Pulmonary/Chest: Breath sounds normal.   Abdominal: Abdomen is soft. Bowel sounds are normal.   Musculoskeletal:         General: Normal range of motion.      Cervical back: Normal range of motion.     Neurological: She is alert and oriented to person, place, and time.   Skin: Skin is warm. Capillary refill takes less than 2 seconds.         ED Course   Procedures  Labs Reviewed   COMPREHENSIVE METABOLIC PANEL - Abnormal; Notable for the following components:       Result Value    Total Bilirubin 1.1 (*)     eGFR if  40 (*)     eGFR if non  35 (*)     All other components within normal limits   CBC W/ AUTO DIFFERENTIAL - Abnormal; Notable for the following components:    MCHC 31.3 (*)     RDW 16.3 (*)     Gran # (ANC) 1.4 (*)     Eos # 0.7 (*)     Gran % 30.1 (*)     Eosinophil % 15.8 (*)     All other components within normal limits   B-TYPE NATRIURETIC PEPTIDE - Abnormal; Notable for the following components:     (*)     All other components within normal limits   D DIMER, QUANTITATIVE - Abnormal; Notable for the following components:     D-Dimer 1.46 (*)     All other components within normal limits   URINALYSIS, REFLEX TO URINE CULTURE - Abnormal; Notable for the following components:    Occult Blood UA 2+ (*)     Leukocytes, UA Trace (*)     All other components within normal limits    Narrative:     Specimen Source->Urine   TROPONIN I   CK   CK-MB   URINALYSIS MICROSCOPIC    Narrative:     Specimen Source->Urine   TROPONIN I   CK   CK-MB   POCT GLUCOSE     EKG Readings: (Independently Interpreted)   Initial Reading: No STEMI. Previous EKG: Compared with most recent EKG Heart Rate: Ventricular paced rhythm at 63 beats per minute. Ectopy: No Ectopy. Conduction: Normal. ST Segments: Normal ST Segments. T Waves: Normal.     ECG Results          EKG 12-lead (Final result)  Result time 04/25/22 10:58:18    Final result by Interface, Lab In [facility] (04/25/22 10:58:18)                 Narrative:    Test Reason : R06.02    Vent. Rate : 061 BPM     Atrial Rate : 041 BPM     P-R Int : 000 ms          QRS Dur : 142 ms      QT Int : 406 ms       P-R-T Axes : 000 023 267 degrees     QTc Int : 408 ms    Atrial fibrillation  Ventricular-paced rhythm  Abnormal ECG  When compared with ECG of 06-MAR-2022 10:38,  No significant change was found  Confirmed by Luis Stewart MD ([id number]) on 4/25/2022 10:58:06 AM    Referred By: [email or web address]   SELF           Confirmed By:Luis Stewart MD                            Imaging Results          US Lower Extremity Veins Bilateral (Final result)  Result time 04/25/22 12:29:20    Final result by Luiz Larsen MD (04/25/22 12:29:20)                 Impression:      No ultrasound evidence of lower extremity deep venous thrombosis.      Electronically signed by: Luiz Larsen MD  Date:    04/25/2022  Time:    12:29             Narrative:    EXAMINATION:  US LOWER EXTREMITY VEINS BILATERAL    CLINICAL HISTORY:  elevated ddimer;    TECHNIQUE:  Transverse and longitudinal grayscale ultrasound images of the bilateral lower  extremity veins with color and spectral doppler analysis was performed.    COMPARISON:  None.    FINDINGS:  Bilateral lower extremity Doppler venous ultrasound examination was performed dated July 25, 2022.    There is evidence of augmentation, flow and compressibility involving the femoral popliteal system, peroneal, tibial and greater saphenous veins.  There is no ultrasound evidence of lower extremity deep venous thrombosis.                               X-Ray Chest 1 View (Final result)  Result time 04/25/22 10:39:12    Final result by Luiz Larsen MD (04/25/22 10:39:12)                 Impression:      Cardiomegaly pacemaker in place.  Stable chronic lung changes.  No significant change when compared with the previous study of March 6, 2022.      Electronically signed by: Luiz Larsen MD  Date:    04/25/2022  Time:    10:39             Narrative:    EXAMINATION:  XR CHEST 1 VIEW    CLINICAL HISTORY:  sob;.    TECHNIQUE:  Portable chest dated April 25, 2022.    COMPARISON:  March 6, 2022.    FINDINGS:  Cardiomegaly with pacemaker in place.  Atherosclerotic changes of the aorta.  Chronic lung changes.  No new focal infiltrate or effusion.  Chronic elevation of the right diaphragm.  Degenerative changes of the spine.                                 Medications - No data to display  Medical Decision Making:   Initial Assessment:   Caregiver reports that the patient was breathing irregularly this morning   Caregiver also checked blood sugar, was 139/she reports that has high  Patient has no obvious signs of distress, has actually no complaints today  Patient has a clear lung exam with no signs of tachypnea on evaluation  Patient has no evidence of hyperglycemia or diabetes complication today    Differential Diagnosis:   Tachypnea, bronchitis, URI, pneumonia, SOB not otherwise specified    Clinical Tests:   Lab Tests: Ordered and Reviewed  Radiological Study: Ordered and Reviewed  Medical Tests: Ordered and  Reviewed    ED Management:  Caregiver reports symptoms, patient denies any symptoms today  Pt had a careful negative cardiac workup with EKG and 2 troponins  Stable chest x-ray with a negative DVT ultrasound as a precaution  Patient did have a mildly elevated D-dimer, but is already on Eliquis  No hypoxia, negative pulmonary workup in the ER this morning  Patient with normal blood sugar without any signs of complication   I made the patient upon with her cardiologist 9:40 a.m. tomorrow  Carefully counseled return to ER with any concerning symptoms                       Clinical Impression:   Final diagnoses:  [R06.02] SOB (shortness of breath)          ED Disposition Condition    Discharge Stable        ED Prescriptions     None        Follow-up Information     Follow up With Specialties Details Why Contact Info    Krishna Nowak MD Cardiology Go in 1 day 9:40 am [address] [place] [address]  [phone]             Fernando Baker MD  04/25/22 1644

## 2022-05-31 ENCOUNTER — LAB VISIT (OUTPATIENT)
Dept: LAB | Facility: HOSPITAL | Age: 87
End: 2022-05-31
Attending: SURGERY
Payer: MEDICARE

## 2022-05-31 DIAGNOSIS — M10.9 GOUT: Primary | ICD-10-CM

## 2022-05-31 LAB — URATE SERPL-MCNC: 7.7 MG/DL (ref 2.4–5.7)

## 2022-05-31 PROCEDURE — 84550 ASSAY OF BLOOD/URIC ACID: CPT | Performed by: SURGERY

## 2022-05-31 PROCEDURE — 36415 COLL VENOUS BLD VENIPUNCTURE: CPT | Performed by: SURGERY

## 2022-06-19 ENCOUNTER — HOSPITAL ENCOUNTER (EMERGENCY)
Facility: HOSPITAL | Age: 87
Discharge: HOME | End: 2022-06-19
Attending: STUDENT IN AN ORGANIZED HEALTH CARE EDUCATION/TRAINING PROGRAM
Payer: MEDICARE

## 2022-06-19 VITALS
TEMPERATURE: 98 F | BODY MASS INDEX: 26.78 KG/M2 | HEART RATE: 60 BPM | WEIGHT: 156 LBS | DIASTOLIC BLOOD PRESSURE: 65 MMHG | OXYGEN SATURATION: 95 % | SYSTOLIC BLOOD PRESSURE: 134 MMHG | RESPIRATION RATE: 18 BRPM

## 2022-06-19 DIAGNOSIS — U07.1 COVID-19 VIRUS DETECTED: ICD-10-CM

## 2022-06-19 DIAGNOSIS — U07.1 COVID: Primary | ICD-10-CM

## 2022-06-19 DIAGNOSIS — R06.02 SHORTNESS OF BREATH: ICD-10-CM

## 2022-06-19 LAB
INFLUENZA A, MOLECULAR: NEGATIVE
INFLUENZA B, MOLECULAR: NEGATIVE
SARS-COV-2 RDRP RESP QL NAA+PROBE: POSITIVE
SPECIMEN SOURCE: NORMAL

## 2022-06-19 PROCEDURE — 87502 INFLUENZA DNA AMP PROBE: CPT | Performed by: STUDENT IN AN ORGANIZED HEALTH CARE EDUCATION/TRAINING PROGRAM

## 2022-06-19 PROCEDURE — 99900031 HC PATIENT EDUCATION (STAT)

## 2022-06-19 PROCEDURE — 99283 EMERGENCY DEPT VISIT LOW MDM: CPT | Mod: 25

## 2022-06-19 PROCEDURE — 94640 AIRWAY INHALATION TREATMENT: CPT

## 2022-06-19 PROCEDURE — 25000242 PHARM REV CODE 250 ALT 637 W/ HCPCS: Performed by: STUDENT IN AN ORGANIZED HEALTH CARE EDUCATION/TRAINING PROGRAM

## 2022-06-19 PROCEDURE — U0002 COVID-19 LAB TEST NON-CDC: HCPCS | Performed by: STUDENT IN AN ORGANIZED HEALTH CARE EDUCATION/TRAINING PROGRAM

## 2022-06-19 PROCEDURE — 63600175 PHARM REV CODE 636 W HCPCS: Performed by: STUDENT IN AN ORGANIZED HEALTH CARE EDUCATION/TRAINING PROGRAM

## 2022-06-19 RX ORDER — IPRATROPIUM BROMIDE AND ALBUTEROL SULFATE 2.5; .5 MG/3ML; MG/3ML
3 SOLUTION RESPIRATORY (INHALATION)
Status: COMPLETED | OUTPATIENT
Start: 2022-06-19 | End: 2022-06-19

## 2022-06-19 RX ORDER — PREDNISONE 20 MG/1
60 TABLET ORAL
Status: COMPLETED | OUTPATIENT
Start: 2022-06-19 | End: 2022-06-19

## 2022-06-19 RX ADMIN — IPRATROPIUM BROMIDE AND ALBUTEROL SULFATE 3 ML: 2.5; .5 SOLUTION RESPIRATORY (INHALATION) at 06:06

## 2022-06-19 RX ADMIN — PREDNISONE 60 MG: 20 TABLET ORAL at 05:06

## 2022-06-19 NOTE — ED TRIAGE NOTES
C/o headache, body aches, nasal congestion, SOB,  and runny nose, and cough since yesterday. Patient coarse bbs.

## 2022-06-19 NOTE — ED PROVIDER NOTES
Encounter Date: 6/19/2022       History     Chief Complaint   Patient presents with    General Illness     89-year-old female with history of asthma, AFib, diabetes, presenting with cough, congestion, shortness of breath, body aches for two days.  Patient was exposed to her grandson who was exposed to COVID.  No chest pain.  No fever.  No vomiting or diarrhea or abdominal pain.  No urinary symptoms.  Patient takes albuterol at home for her asthma.        Review of patient's allergies indicates:  No Known Allergies  Past Medical History:   Diagnosis Date    A-fib     Arthritis     Diabetes mellitus     Digestive disorder     ACID REFLUX    GAVIN (dyspnea on exertion)     Encounter for blood transfusion     70 YEARS AGO PRIOR TO CHANGING TO     Gout     Hypertension     On supplemental oxygen by nasal cannula     Shingles     Vertigo      Past Surgical History:   Procedure Laterality Date    A-V CARDIAC PACEMAKER INSERTION N/A 2/24/2021    Procedure: INSERTION, MEDTRONIC CARDIAC PACEMAKER, DUAL CHAMBER;  Surgeon: Gustavo Roca MD;  Location: [facility] CATH;  Service: Cardiology;  Laterality: N/A;    CHOLECYSTECTOMY      CYSTOSCOPY W/ RETROGRADES Bilateral 7/16/2021    Procedure: CYSTOSCOPY, WITH RETROGRADE PYELOGRAM;  Surgeon: Tommy Brewer Jr., MD;  Location: [facility] OR [address] [place] [address];  Service: Urology;  Laterality: Bilateral;  1hr    HERNIA REPAIR      HYSTERECTOMY       Family History   Problem Relation Age of Onset    Cancer Mother      Social History     Tobacco Use    Smoking status: Former Smoker     Years: 20.00     Types: Cigarettes    Smokeless tobacco: Never Used   Substance Use Topics    Alcohol use: Not Currently     Comment: occ    Drug use: No     Review of Systems   Constitutional: Negative for fever.   HENT: Positive for congestion. Negative for sore throat.    Respiratory: Positive for cough and shortness of breath.    Cardiovascular: Negative for chest pain.   Gastrointestinal:  Negative for nausea.   Genitourinary: Negative for dysuria.   Musculoskeletal: Positive for myalgias. Negative for back pain.   Skin: Negative for rash.   Neurological: Negative for weakness.   Hematological: Does not bruise/bleed easily.       Physical Exam     Initial Vitals [06/19/22 1647]   BP Pulse Resp Temp SpO2   134/65 65 20 98 °F (36.7 °C) 95 %      MAP       --         Physical Exam    Nursing note and vitals reviewed.  Constitutional: She appears well-developed. She is not diaphoretic. No distress.   HENT:   Head: Normocephalic.   Eyes: Pupils are equal, round, and reactive to light.   Neck:   Normal range of motion.  Cardiovascular:   No murmur heard.  Pulmonary/Chest: No respiratory distress.   Bilateral crackles at bases.  Bilateral diffuse wheezing.  Good air movement.  No respiratory distress.  No accessory muscle use.   Abdominal: Abdomen is soft.   Musculoskeletal:         General: No edema.      Cervical back: Normal range of motion.     Neurological: She is alert.   Skin: Skin is warm.   Psychiatric: She has a normal mood and affect.         ED Course   Procedures  Labs Reviewed   INFLUENZA A & B BY MOLECULAR   SARS-COV-2 RNA AMPLIFICATION, QUAL          Imaging Results    None          Medications   predniSONE tablet 60 mg (has no administration in time range)     Medical Decision Making:   Differential Diagnosis:   DDX: Likely viral syndrome leading to asthma exacerbation given history, exam. R/o PNA. Unlikely ACS/PE given history, physical, risk factor analysis, +other more likely diagnosis.   DX:  COVID, influenza, CXR.   TX: Analgesia PRN. Albuterol/atrovent nebs PRN. Steroids. Antibiotics if indicated by CXR. Treatment/consult as indicated by studies.  Dispo: Pending studies. If symptoms controlled, studies WNL or stable for outpatient management, discharge to follow up with PMD within 3-5 days with steroid course and albuterol PRN, precautions for return, and recommendations for supportive  care. If no improvement in respiratory distress with appropriate observation in ED, consider observation vs. admission for acute asthma exacerbation.                        Clinical Impression:   Final diagnoses:  [R06.02] Shortness of breath                 Desmond Carmona MD  06/19/22 3807

## 2022-06-19 NOTE — DISCHARGE INSTRUCTIONS
Please follow up with your primary care physician within 2 days. Ensure that you review all lab work results and imaging results. If you have any questions about your discharge paperwork please call the Emergency Department.     Return to the Emergency Department for any fevers, worsening cough or congestion, shortness of breath, chest pain, nausea, vomiting, diarrhea, if symptoms do not improve, or for any new or worsening symptoms, unless otherwise told.  If you have been discharged pending a COVID test, please isolate as per the instructions given to you, and follow-up using the MyChart instructions in your discharge paperwork.  If you test positive, please contact the [facility] ([phone]) and schedule your infusion appointment, if you qualify.    Thank you for visiting [facility], Department of Emergency Medicine. Please see the entirety of the educational materials provided. Please note that a visit to the emergency department does not substitute ongoing care from a primary medical provider or specialist. Please ensure to follow up as recommended. However, please return to the emergency department immediately if symptoms do not improve as discussed, symptoms worsen, new symptoms develop, difficulty in following up or for any of your concerns or issues. Please note on discharge you are acknowledging understanding and agreement on medical evaluation, management recommendations and follow up recommendations.        If you have a COVID Test PENDING:    Please access [facility] to review the results of your test. Until the results of your COVID test return, you should isolate yourself so as not to potentially spread illness to others.    If your COVID test returns positive, you should isolate yourself so as not to spread illness to others. After five full days, if you are feeling better and you have not had fever for 24 hours, you can return to your typical daily  activities, but you must wear a mask around others for an additional 5 days.    If your COVID test returns negative and you are either unvaccinated or more than six months out from your two-dose vaccine and are not yet boosted, you should still quarantine for 5 full days followed by strict mask use for an additional 5 full days.    If your COVID test returns negative and you have received your 2-dose initial vaccine as well as a booster, you should continue strict mask use for 10 full days after the exposure.    For all those exposed, best practice includes a test at day 5 after the exposure. This can be a home test or a test through one of the many testing centers throughout our community.    Masking is always advised to limit the spread of COVID. Cdc.gov is an excellent site to obtain the latest up to date recommendations regarding COVID and COVID testing.         After your evaluation today, we ruled out any emergent condition. However, if you develop shortness of breath, chest pain, or ANY OTHER CONCERNS please return to the emergency department for further care.         CDC Testing and Quarantine Guidelines for patients with exposure to a known-positive COVID-19 person:    A close exposure is defined as anyone who has had an exposure (masked or unmasked) to a known COVID -19 positive person within 6 feet of someone for a cumulative total of 15 minutes or more over a 24-hour period.    Vaccinated and/or if you recently had a positive covid test within 90 days do NOT need to quarantine after contact with someone who had COVID-19 unless you develop symptoms.    Fully vaccinated people who have not had a positive test within 90 days, should get tested 3-5 days after their exposure, even if they don't have symptoms and wear a mask indoors in public for 14 days following exposure or until their test result is negative.         Unvaccinated and/or NOT had a positive test within 90 days and meet close  exposure    You are required by CDC guidelines to quarantine for at least 5 days from time of exposure followed by 5 days of strict masking. It is recommended, but not required to test after 5 days, unless you develop symptoms, in which case you should test at that time.    If you get tested after 5 days and your test is positive, your 5 day period of isolation starts the day of the positive test.     If your exposure does not meet the above definition, you can return to your normal daily activities to include social distancing, wearing a mask and frequent handwashing.         Here is a link to guidance from the CDC:    https://www.cdc.gov/media/releases/2021/s1227-isolation-quarantine-guidance.html      [facility] Testing Sites:    https://ldh.[place].gov/page/3934      [facility] website with testing locations and guidance:    [email or web address]

## 2022-06-20 ENCOUNTER — INFUSION (OUTPATIENT)
Dept: INFECTIOUS DISEASES | Facility: HOSPITAL | Age: 87
End: 2022-06-20
Attending: STUDENT IN AN ORGANIZED HEALTH CARE EDUCATION/TRAINING PROGRAM
Payer: MEDICARE

## 2022-06-20 VITALS
DIASTOLIC BLOOD PRESSURE: 75 MMHG | TEMPERATURE: 99 F | OXYGEN SATURATION: 96 % | HEART RATE: 61 BPM | RESPIRATION RATE: 20 BRPM | SYSTOLIC BLOOD PRESSURE: 159 MMHG

## 2022-06-20 DIAGNOSIS — R06.02 SHORTNESS OF BREATH: ICD-10-CM

## 2022-06-20 DIAGNOSIS — U07.1 COVID: Primary | ICD-10-CM

## 2022-06-20 PROCEDURE — 63600175 PHARM REV CODE 636 W HCPCS: Performed by: FAMILY MEDICINE

## 2022-06-20 PROCEDURE — M0222 HC IV INJECTION, BEBTELOVIMAB, INCL POST ADMIN MONIT: HCPCS | Performed by: FAMILY MEDICINE

## 2022-06-20 RX ORDER — BEBTELOVIMAB 87.5 MG/ML
175 INJECTION, SOLUTION INTRAVENOUS
Status: COMPLETED | OUTPATIENT
Start: 2022-06-20 | End: 2022-06-20

## 2022-06-20 RX ORDER — ALBUTEROL SULFATE 90 UG/1
2 AEROSOL, METERED RESPIRATORY (INHALATION)
Status: ACTIVE | OUTPATIENT
Start: 2022-06-20 | End: 2022-06-23

## 2022-06-20 RX ORDER — ONDANSETRON 4 MG/1
4 TABLET, ORALLY DISINTEGRATING ORAL
Status: ACTIVE | OUTPATIENT
Start: 2022-06-20 | End: 2022-06-21

## 2022-06-20 RX ORDER — EPINEPHRINE 0.3 MG/.3ML
0.3 INJECTION SUBCUTANEOUS
Status: ACTIVE | OUTPATIENT
Start: 2022-06-20 | End: 2022-06-23

## 2022-06-20 RX ORDER — DIPHENHYDRAMINE HYDROCHLORIDE 50 MG/ML
25 INJECTION INTRAMUSCULAR; INTRAVENOUS
Status: ACTIVE | OUTPATIENT
Start: 2022-06-20 | End: 2022-06-21

## 2022-06-20 RX ORDER — ACETAMINOPHEN 325 MG/1
650 TABLET ORAL
Status: ACTIVE | OUTPATIENT
Start: 2022-06-20 | End: 2022-06-21

## 2022-06-20 RX ADMIN — BEBTELOVIMAB 175 MG: 87.5 INJECTION, SOLUTION INTRAVENOUS at 01:06

## 2022-06-20 NOTE — PROGRESS NOTES
1405  Pt tolerated medication well.    No reaction noted.  Dc to home in stable condition.  Instructed to return to ER for worsening.

## 2022-06-21 ENCOUNTER — OUTPATIENT CASE MANAGEMENT (OUTPATIENT)
Dept: ADMINISTRATIVE | Facility: OTHER | Age: 87
End: 2022-06-21
Payer: MEDICARE

## 2022-06-21 NOTE — LETTER
June 21, 2022    Roseanne Marroquin  [address] [place] [address]             [facility] [address] [place] [address] Dear Ms Roseanne Marroquin:    Welcome to [facility]s Complex Care Management Program.  It was a pleasure talking with you today.  My name is Antonia Vieira, RN, [facility]. I look forward to working with you as your .  My goal is to help you function at the healthiest and highest level possible.  You can contact me directly at [phone].    As an [facility] patient, some of the services we may be able to provide include:      Development of an individualized care plan with a Registered Nurse    Connection with a    Connection with available resources and services     Coordinate communication among your care team members    Provide coaching and education    Help you understand your doctors treatment plan   Help you obtain information about your insurance coverage.     All services provided by [facility]s Complex Care Managers and other care team members are coordinated with and communicated to your primary care team.    As part of your enrollment, you will be receiving education materials and more information about these services in your [facility] account, by phone or through the mail.  If you do not wish to participate or receive information, please contact our office at [phone].      Sincerely,    Antonia Vieira RN, CCM [facility]   Out-patient RN Complex Care Manager  [phone]

## 2022-06-21 NOTE — PROGRESS NOTES
Outpatient Care Management  Initial Patient Assessment    Patient: Roseanne Marroquin  MRN: [id number]  Date of Service: 06/21/2022  Completed by: Suzy Vieira RN  Referral Date: 06/19/2022  Program: High Risk  Status: Ongoing  Effective Dates: 6/21/2022 - present  Responsible Staff: Suzy Vieira RN        Reason for Visit   Patient presents with    OPCM Chart Review     6/21/2022    Initial Assessment     6/21/2022    Plan Of Care     6/21/2022       Brief Summary:  Roseanne Marroquin was referred by Hospital Provider for [facility] OP Case Management for COVID and COPD. Patient qualifies for program based on  Risk Score of 95.3%  .   Active problem list, medical, surgical and social history reviewed. Active comorbidities include COPD, COVID, DM, HTN, Afib, Diastolic CHF, Sick Sinus Syndrome, Pacemaker.  Areas of need identified by patient include:    Patient is a 89 year old very pleasant, alert female seen in ER 6/19/2022 for COVID, had infusion as OP 6/20/2022 and is in quarantine at home. Patient lives in a one story home  (with ramp to enter) with her grandson and great-grandson. Patient stated they are always working and not home much. Patient's other Grandson, Chong, and Granddaughter-in-law, Manisha, are staying with her during the isolation period. Patient has a sitter, Tika, 5 days per week (8am-2pm) that will return once patient's quarantine is over. Patient is on 2L nc oxygen at home, uses her inhalers and takes medication as prescribed for her COPD, DM, HTN, A fib, CHF. Patient's sitter sets out her medications in the bottles, does light house cleaning, cooks and drives patient to her MD appointments. Patient's family grocery shops, son pays her bills and picks up her medications from the drug store. Patient is ambulatory with rollator and is independent in ADLs.     Discussed COPD flares and discussed with patient the benefits of using pill box organizer instead of taking medications from  the bottles for every dose. Patient does not claim to have any problem currently with HTN, DM CHF, or Falls, CM will mail out education material on those as a precautionary measure. CM will mail education material on COPD, Living with COPD, Living with Heart Failure, Preventing Falls in the Older Adult, BP and Glucose logs, Advanced Directives and a Pill Box.     Complex care plan created with patient/caregiver input.      Assessment Documentation     OPCM Initial Assessment    Involvement of Care  Do I have permission to speak with other family members about your care?: Yes (Comment: Chong Du ([phone]))  Assessment completed by: Family (Comment: grandchildren)  Identified Areas of Need  Advanced Care Planning: Yes  Housing: no  Medication Adherence: No  *Active medication list was reviewed and reconciled with patient and/or caregiver:   Nutrition: no  Lab Adherence: no  Depression: No (Comment: PHQ = 0)  Cognitive/Behavioral Health: no  Communication: no  Health Literacy: Yes  Fall risk?: No  Equipment/Supplies/Services: no          Problem List and History     Problems Addressed This Visit    Atrial Fibrillation: Not identified by patient as current problem  COPD: Identified as current problem  Heart Failure: Not identified by patient as current problem  Hypertension: Not identified by patient as current problem  Diabetes: Not identified by patient as current problem  Chronic Kidney Disease: Not identified by patient as current problem  Hyperlipidemia: Not identified by patient as current problem  Heart Disease: Not identified by patient as current problem         Reviewed medical and social history with patient and/or caregiver. A complex care plan was discussed and completed today, with input from patient and/or caregiver.    Patient Instructions     Instructions were provided via education material and are available for the patient to view on the patient portal, if active.    Follow up in  about 1 week (around 6/28/2022) for OPCM RN follow up to upddate care plan..    Todays OPCM Self-Management Care Plan was developed with the patients/caregivers input and was based on identified barriers from todays assessment.  Goals were written today with the patient/caregiver and the patient has agreed to work towards these goals to improve his/her overall well-being. Patient verbalized understanding of the care plan, goals, and all of today's instructions. Encouraged patient/caregiver to communicate with his/her physician and health care team about health conditions and the treatment plan.  Provided my contact information today and encouraged patient/caregiver to call me with any questions as needed.

## 2022-06-29 ENCOUNTER — OUTPATIENT CASE MANAGEMENT (OUTPATIENT)
Dept: ADMINISTRATIVE | Facility: OTHER | Age: 87
End: 2022-06-29
Payer: MEDICARE

## 2022-06-29 NOTE — PROGRESS NOTES
Outpatient Care Management  Plan of Care Follow Up Visit    Patient: Roseanne Marroquin  MRN: [id number]  Date of Service: 06/29/2022  Completed by: Suzy Vieira RN  Referral Date: 06/19/2022  Program: Case Management (High Risk)    Reason for Visit   Patient presents with    Update Plan Of Care     6/29/2022       Brief Summary: Patient stated she is doing much better, over Covid. Still using her nebulizer treatments daily and only uses 2L nc oxygen when needed. Stated she can go most of the day without oxygen and does not use it at night. Patient denies SOB or pain today. Patient stated her sitter, Tika, started coming back to her home Tuesday, after her quarantine was over (M-F 8am - 2pm). Patient stated she did received mailed packet from , stated she can not read so waiting for her grandson to read education material to her. Stated she did give her sitter the pill box and has started using that and the BP/glucose logs. Patient request compression hose for her legs. Stated she used to have them years ago and feels she needs them again.    will network about how to obtain patient's requested compression hose.      Patient Summary     Involvement of Care:  Do I have permission to speak with other family members about your care?       Patient Reported Labs & Vitals:  1.  Any Patient Reported Labs & Vitals?     2.  Patient Reported Blood Pressure:     3.  Patient Reported Pulse:     4.  Patient Reported Weight (Kg):     5.  Patient Reported Blood Glucose (mg/dl):       Medical and social history was reviewed with patient and/or caregiver.     Clinical Assessment     Reviewed and provided basic information on available community resources for mental health, transportation, wellness resources, and palliative care programs with patient and/or caregiver.     Complex Care Plan     Care plan was discussed and completed today with input from patient and/or caregiver.    Patient Instructions       Follow up in about  9 days (around 7/8/2022) for OPCM RN follow up to upddate care plan..    Todays OPCM Self-Management Care Plan was developed with the patients/caregivers input and was based on identified barriers from todays assessment.  Goals were written today with the patient/caregiver and the patient has agreed to work towards these goals to improve his/her overall well-being. Patient verbalized understanding of the care plan, goals, and all of today's instructions. Encouraged patient/caregiver to communicate with his/her physician and health care team about health conditions and the treatment plan.  Provided my contact information today and encouraged patient/caregiver to call me with any questions as needed.

## 2022-07-11 ENCOUNTER — OUTPATIENT CASE MANAGEMENT (OUTPATIENT)
Dept: ADMINISTRATIVE | Facility: OTHER | Age: 87
End: 2022-07-11
Payer: MEDICARE

## 2022-07-13 NOTE — PROGRESS NOTES
"Outpatient Care Management  Plan of Care Follow Up Visit    Patient: Roseanne Marroquin  MRN: [id number]  Date of Service: 07/11/2022  Completed by: Suzy Vieira RN  Referral Date: 06/19/2022  Program: Case Management (High Risk)    Reason for Visit   Patient presents with    OPCM RN First Follow-Up Attempt     7/11/2022    Update Plan Of Care     7/13/2022       Brief Summary:     CM informed patient that her insurance will not pay for patient's requested compression hose with understanding verbalized. Patient stated she is doing somewhat better since she had Covid. Stated she still gets a slight headache, "it seems when it is cloudy outside" and goes away after a little while. Stated she does not take anything for the headache. Stated she still has a little cough with small amount of phlegm (thick clear) production and reports some SOB with exertion. Wears her home oxygen as needed, "not all the time". Patient stated she is still getting around her home with walker, takes showers, dresses and cooks breakfast herself. Patient stated one of her grandchildren will usually bring her lunch they cook and eats something light in the evening. Patient stated her son has not read the education material to her yet.  Discussed need for patient to keep moving around the home with walker to slowly build up her stamina and to continue to wear her oxygen as needed. Discussed need for patient to have son read education material whenever possible. Stated her son is working on his home and she will try to have him read education material and agrees to follow up phone call from [facility] 8/3/2022.      Patient Summary     Involvement of Care:  Do I have permission to speak with other family members about your care?       Patient Reported Labs & Vitals:  1.  Any Patient Reported Labs & Vitals?     2.  Patient Reported Blood Pressure:     3.  Patient Reported Pulse:     4.  Patient Reported Weight (Kg):     5.  Patient Reported Blood " Glucose (mg/dl):       Medical and social history was reviewed with patient and/or caregiver.     Clinical Assessment     Reviewed and provided basic information on available community resources for mental health, transportation, wellness resources, and palliative care programs with patient and/or caregiver.     Complex Care Plan     Care plan was discussed and completed today with input from patient and/or caregiver.    Patient Instructions     Instructions were provided via mailed education material and are available for the patient to view on the patient portal.      Follow up in about 23 days (around 8/3/2022) [facility]    Todays OPCM Self-Management Care Plan was developed with the patients/caregivers input and was based on identified barriers from todays assessment.  Goals were written today with the patient/caregiver and the patient has agreed to work towards these goals to improve his/her overall well-being. Patient verbalized understanding of the care plan, goals, and all of today's instructions. Encouraged patient/caregiver to communicate with his/her physician and health care team about health conditions and the treatment plan.  Provided my contact information today and encouraged patient/caregiver to call me with any questions as needed.

## 2022-08-04 ENCOUNTER — OUTPATIENT CASE MANAGEMENT (OUTPATIENT)
Dept: ADMINISTRATIVE | Facility: OTHER | Age: 87
End: 2022-08-04
Payer: MEDICARE

## 2022-08-10 NOTE — PROGRESS NOTES
"Outpatient Care Management  Plan of Care Follow Up Visit    Patient: Roseanne Marroquin  MRN: [id number]  Date of Service: 08/04/2022  Completed by: Suzy Vieira RN  Referral Date: 06/19/2022  Program: Case Management (High Risk)    Reason for Visit   Patient presents with    OPCM RN First Follow-Up Attempt     8/4/2022    Update Plan Of Care     8/10/2022       Brief Summary:  Patient stated she has been feeling "pretty good" except she is having right hip pain when she tries to stand, stated she it "feels like her osteoarthritis, took tylenol and it worked pretty good". Patient stated she is not wearing the home oxygen all the time, stated she only uses it if she feels short of breath, no too much lately. Patient stated she is out of her nebulizer medication for her respiratory treatments. Patient stated she is not getting up to do any walking around the house anymore. Patient stated she would like to see if home health can come back to work with her.    Next Steps:  CM to in basket message PCP requesting refill of medication and orders for HH PT/OT.                 Patient agrees to work with PT/OT and to follow up phone call from OPCM 8/25/2022     Follow up in about 3 weeks (around 8/25/2022) [facility].      Patient Summary     Involvement of Care:  Do I have permission to speak with other family members about your care?       Patient Reported Labs & Vitals:  1.  Any Patient Reported Labs & Vitals?     2.  Patient Reported Blood Pressure:     3.  Patient Reported Pulse:     4.  Patient Reported Weight (Kg):     5.  Patient Reported Blood Glucose (mg/dl):       Medical and social history was reviewed with patient and/or caregiver.     Clinical Assessment     Reviewed and provided basic information on available community resources for mental health, transportation, wellness resources, and palliative care programs with patient and/or caregiver.     Complex Care Plan     Care " plan was discussed and completed today with input from patient and/or caregiver.    Patient Instructions     Instructions were provided via the mailed education material and are available for the patient to view on the patient portal.      Todays OPCM Self-Management Care Plan was developed with the patients/caregivers input and was based on identified barriers from todays assessment.  Goals were written today with the patient/caregiver and the patient has agreed to work towards these goals to improve his/her overall well-being. Patient verbalized understanding of the care plan, goals, and all of today's instructions. Encouraged patient/caregiver to communicate with his/her physician and health care team about health conditions and the treatment plan.  Provided my contact information today and encouraged patient/caregiver to call me with any questions as needed.

## 2022-08-26 ENCOUNTER — OUTPATIENT CASE MANAGEMENT (OUTPATIENT)
Dept: ADMINISTRATIVE | Facility: OTHER | Age: 87
End: 2022-08-26
Payer: MEDICARE

## 2022-08-26 NOTE — PROGRESS NOTES
"Outpatient Care Management  Plan of Care Follow Up Visit    Patient: Roseanne Marroquin  MRN: [id number]  Date of Service: 2022  Completed by: Suzy Vieira RN  Referral Date: 2022    Reason for Visit   Patient presents with    Update Plan Of Care     2022       Brief Summary:     Patient reports feeling "pretty good" today and has been steadily breathing better since starting her nebulizer treatments. Patient has not gotten refill on nebulizer medication since message was sent to PCP 8/10/2022. Patient also stated HH PT/OT was not ordered as CM requested from PCP. Patient stated her PCP (Dr Liam Andrea)  recently, noted son has taken over patient's care as her PCP.     CM called PCP office, left message. Return call from Mirna (Nurse in Dr Andrea's office). Stated she spoke recently to care giver and they did not mention anything about needing nebulizer medication refill and nothing about HH PT/OT. Mirna stated she will call patient's grandson to figure out what she needs. Patient updated.                    Next step: Patient agrees to work with PT/OT and to follow up phone call from OPCM 2022.       Patient Summary     Involvement of Care:  Do I have permission to speak with other family members about your care?       Medical and social history was reviewed with patient and/or caregiver.     Clinical Assessment     Reviewed and provided basic information on available community resources for mental health, transportation, wellness resources, and palliative care programs with patient and/or caregiver.     Complex Care Plan     Care plan was discussed and completed today with input from patient and/or caregiver.    Patient Instructions     Instructions were provided via the mailed education material and are available for the patient to view on the patient portal.    Follow up in about 4 weeks (around 2022) for OPCM RN follow up to upddate care plan..    Todays OPCM " Self-Management Care Plan was developed with the patients/caregivers input and was based on identified barriers from todays assessment.  Goals were written today with the patient/caregiver and the patient has agreed to work towards these goals to improve his/her overall well-being. Patient verbalized understanding of the care plan, goals, and all of today's instructions. Encouraged patient/caregiver to communicate with his/her physician and health care team about health conditions and the treatment plan.  Provided my contact information today and encouraged patient/caregiver to call me with any questions as needed.

## 2022-09-20 ENCOUNTER — HOSPITAL ENCOUNTER (OUTPATIENT)
Facility: HOSPITAL | Age: 87
Discharge: HOME | End: 2022-09-22
Attending: SURGERY | Admitting: INTERNAL MEDICINE
Payer: MEDICARE

## 2022-09-20 DIAGNOSIS — I25.10 CARDIOVASCULAR DISEASE: ICD-10-CM

## 2022-09-20 DIAGNOSIS — R94.31 ABNORMAL EKG: ICD-10-CM

## 2022-09-20 DIAGNOSIS — I49.9 CARDIAC RHYTHM DISORDER OR DISTURBANCE OR CHANGE: ICD-10-CM

## 2022-09-20 DIAGNOSIS — R07.9 CHEST PAIN: ICD-10-CM

## 2022-09-20 LAB
ALBUMIN SERPL BCP-MCNC: 3.9 G/DL (ref 3.5–5.2)
ALP SERPL-CCNC: 60 U/L (ref 55–135)
ALT SERPL W/O P-5'-P-CCNC: 11 U/L (ref 10–44)
ANION GAP SERPL CALC-SCNC: 11 MMOL/L (ref 8–16)
AST SERPL-CCNC: 14 U/L (ref 10–40)
BASOPHILS # BLD AUTO: 0.04 K/UL (ref 0–0.2)
BASOPHILS NFR BLD: 0.9 % (ref 0–1.9)
BILIRUB SERPL-MCNC: 1.4 MG/DL (ref 0.1–1)
BNP SERPL-MCNC: 147 PG/ML (ref 0–99)
BUN SERPL-MCNC: 16 MG/DL (ref 10–30)
CALCIUM SERPL-MCNC: 9.5 MG/DL (ref 8.7–10.5)
CHLORIDE SERPL-SCNC: 102 MMOL/L (ref 95–110)
CK SERPL-CCNC: 143 U/L (ref 20–180)
CO2 SERPL-SCNC: 27 MMOL/L (ref 23–29)
CREAT SERPL-MCNC: 1.1 MG/DL (ref 0.5–1.4)
D DIMER PPP IA.FEU-MCNC: 0.91 MG/L FEU
DIFFERENTIAL METHOD: ABNORMAL
EOSINOPHIL # BLD AUTO: 0.5 K/UL (ref 0–0.5)
EOSINOPHIL NFR BLD: 11 % (ref 0–8)
ERYTHROCYTE [DISTWIDTH] IN BLOOD BY AUTOMATED COUNT: 15.1 % (ref 11.5–14.5)
EST. GFR  (NO RACE VARIABLE): 46 ML/MIN/1.73 M^2
GLUCOSE SERPL-MCNC: 95 MG/DL (ref 70–110)
HCT VFR BLD AUTO: 40.5 % (ref 37–48.5)
HGB BLD-MCNC: 13.2 G/DL (ref 12–16)
IMM GRANULOCYTES # BLD AUTO: 0.01 K/UL (ref 0–0.04)
IMM GRANULOCYTES NFR BLD AUTO: 0.2 % (ref 0–0.5)
LYMPHOCYTES # BLD AUTO: 2.2 K/UL (ref 1–4.8)
LYMPHOCYTES NFR BLD: 48.2 % (ref 18–48)
MCH RBC QN AUTO: 28.4 PG (ref 27–31)
MCHC RBC AUTO-ENTMCNC: 32.6 G/DL (ref 32–36)
MCV RBC AUTO: 87 FL (ref 82–98)
MONOCYTES # BLD AUTO: 0.3 K/UL (ref 0.3–1)
MONOCYTES NFR BLD: 7.1 % (ref 4–15)
NEUTROPHILS # BLD AUTO: 1.5 K/UL (ref 1.8–7.7)
NEUTROPHILS NFR BLD: 32.6 % (ref 38–73)
NRBC BLD-RTO: 0 /100 WBC
PLATELET # BLD AUTO: 195 K/UL (ref 150–450)
PMV BLD AUTO: 9.3 FL (ref 9.2–12.9)
POTASSIUM SERPL-SCNC: 3.8 MMOL/L (ref 3.5–5.1)
PROT SERPL-MCNC: 7.5 G/DL (ref 6–8.4)
RBC # BLD AUTO: 4.64 M/UL (ref 4–5.4)
SODIUM SERPL-SCNC: 140 MMOL/L (ref 136–145)
TROPONIN I SERPL DL<=0.01 NG/ML-MCNC: 0.05 NG/ML (ref 0–0.03)
TROPONIN I SERPL DL<=0.01 NG/ML-MCNC: 0.06 NG/ML (ref 0–0.03)
WBC # BLD AUTO: 4.65 K/UL (ref 3.9–12.7)

## 2022-09-20 PROCEDURE — 93010 EKG 12-LEAD: ICD-10-PCS | Mod: ,,, | Performed by: INTERNAL MEDICINE

## 2022-09-20 PROCEDURE — 85379 FIBRIN DEGRADATION QUANT: CPT | Performed by: SURGERY

## 2022-09-20 PROCEDURE — 82550 ASSAY OF CK (CPK): CPT | Performed by: SURGERY

## 2022-09-20 PROCEDURE — 80053 COMPREHEN METABOLIC PANEL: CPT | Performed by: SURGERY

## 2022-09-20 PROCEDURE — 96372 THER/PROPH/DIAG INJ SC/IM: CPT | Performed by: SURGERY

## 2022-09-20 PROCEDURE — 36415 COLL VENOUS BLD VENIPUNCTURE: CPT | Performed by: SURGERY

## 2022-09-20 PROCEDURE — 84484 ASSAY OF TROPONIN QUANT: CPT | Mod: 91 | Performed by: SURGERY

## 2022-09-20 PROCEDURE — 93010 ELECTROCARDIOGRAM REPORT: CPT | Mod: ,,, | Performed by: INTERNAL MEDICINE

## 2022-09-20 PROCEDURE — 83880 ASSAY OF NATRIURETIC PEPTIDE: CPT | Performed by: SURGERY

## 2022-09-20 PROCEDURE — G0378 HOSPITAL OBSERVATION PER HR: HCPCS

## 2022-09-20 PROCEDURE — 83036 HEMOGLOBIN GLYCOSYLATED A1C: CPT | Performed by: SURGERY

## 2022-09-20 PROCEDURE — 99285 EMERGENCY DEPT VISIT HI MDM: CPT | Mod: 25

## 2022-09-20 PROCEDURE — 27000221 HC OXYGEN, UP TO 24 HOURS

## 2022-09-20 PROCEDURE — 25000003 PHARM REV CODE 250: Performed by: SURGERY

## 2022-09-20 PROCEDURE — 85025 COMPLETE CBC W/AUTO DIFF WBC: CPT | Performed by: SURGERY

## 2022-09-20 PROCEDURE — 93005 ELECTROCARDIOGRAM TRACING: CPT

## 2022-09-20 PROCEDURE — 94760 N-INVAS EAR/PLS OXIMETRY 1: CPT

## 2022-09-20 RX ORDER — IBUPROFEN 200 MG
24 TABLET ORAL
Status: DISCONTINUED | OUTPATIENT
Start: 2022-09-20 | End: 2022-09-22 | Stop reason: HOSPADM

## 2022-09-20 RX ORDER — HYDROCODONE BITARTRATE AND ACETAMINOPHEN 5; 325 MG/1; MG/1
1 TABLET ORAL EVERY 4 HOURS PRN
Status: DISCONTINUED | OUTPATIENT
Start: 2022-09-20 | End: 2022-09-22 | Stop reason: HOSPADM

## 2022-09-20 RX ORDER — ASPIRIN 325 MG
325 TABLET ORAL
Status: COMPLETED | OUTPATIENT
Start: 2022-09-20 | End: 2022-09-20

## 2022-09-20 RX ORDER — ATORVASTATIN CALCIUM 20 MG/1
20 TABLET, FILM COATED ORAL NIGHTLY
Status: DISCONTINUED | OUTPATIENT
Start: 2022-09-20 | End: 2022-09-22 | Stop reason: HOSPADM

## 2022-09-20 RX ORDER — SODIUM CHLORIDE 0.9 % (FLUSH) 0.9 %
10 SYRINGE (ML) INJECTION
Status: DISCONTINUED | OUTPATIENT
Start: 2022-09-20 | End: 2022-09-22 | Stop reason: HOSPADM

## 2022-09-20 RX ORDER — ONDANSETRON 2 MG/ML
4 INJECTION INTRAMUSCULAR; INTRAVENOUS EVERY 8 HOURS PRN
Status: DISCONTINUED | OUTPATIENT
Start: 2022-09-20 | End: 2022-09-22 | Stop reason: HOSPADM

## 2022-09-20 RX ORDER — METOPROLOL SUCCINATE 50 MG/1
100 TABLET, EXTENDED RELEASE ORAL DAILY
Status: DISCONTINUED | OUTPATIENT
Start: 2022-09-21 | End: 2022-09-21

## 2022-09-20 RX ORDER — TALC
6 POWDER (GRAM) TOPICAL NIGHTLY PRN
Status: DISCONTINUED | OUTPATIENT
Start: 2022-09-20 | End: 2022-09-22 | Stop reason: HOSPADM

## 2022-09-20 RX ORDER — GLUCAGON 1 MG
1 KIT INJECTION
Status: DISCONTINUED | OUTPATIENT
Start: 2022-09-20 | End: 2022-09-22 | Stop reason: HOSPADM

## 2022-09-20 RX ORDER — INSULIN ASPART 100 [IU]/ML
6 INJECTION, SOLUTION INTRAVENOUS; SUBCUTANEOUS
Status: DISCONTINUED | OUTPATIENT
Start: 2022-09-21 | End: 2022-09-22 | Stop reason: HOSPADM

## 2022-09-20 RX ORDER — FUROSEMIDE 20 MG/1
20 TABLET ORAL DAILY
Status: DISCONTINUED | OUTPATIENT
Start: 2022-09-21 | End: 2022-09-22 | Stop reason: HOSPADM

## 2022-09-20 RX ORDER — ACETAMINOPHEN 325 MG/1
650 TABLET ORAL EVERY 8 HOURS PRN
Status: DISCONTINUED | OUTPATIENT
Start: 2022-09-20 | End: 2022-09-22 | Stop reason: HOSPADM

## 2022-09-20 RX ORDER — INSULIN ASPART 100 [IU]/ML
0-5 INJECTION, SOLUTION INTRAVENOUS; SUBCUTANEOUS
Status: DISCONTINUED | OUTPATIENT
Start: 2022-09-20 | End: 2022-09-22 | Stop reason: HOSPADM

## 2022-09-20 RX ORDER — AMLODIPINE BESYLATE 10 MG/1
10 TABLET ORAL DAILY
Status: DISCONTINUED | OUTPATIENT
Start: 2022-09-21 | End: 2022-09-22 | Stop reason: HOSPADM

## 2022-09-20 RX ORDER — IBUPROFEN 200 MG
16 TABLET ORAL
Status: DISCONTINUED | OUTPATIENT
Start: 2022-09-20 | End: 2022-09-22 | Stop reason: HOSPADM

## 2022-09-20 RX ORDER — NAPROXEN SODIUM 220 MG/1
81 TABLET, FILM COATED ORAL DAILY
Status: DISCONTINUED | OUTPATIENT
Start: 2022-09-21 | End: 2022-09-22 | Stop reason: HOSPADM

## 2022-09-20 RX ORDER — HYDROCHLOROTHIAZIDE 25 MG/1
25 TABLET ORAL DAILY
Status: DISCONTINUED | OUTPATIENT
Start: 2022-09-21 | End: 2022-09-21

## 2022-09-20 RX ADMIN — ASPIRIN 325 MG ORAL TABLET 325 MG: 325 PILL ORAL at 04:09

## 2022-09-20 RX ADMIN — ATORVASTATIN CALCIUM 20 MG: 20 TABLET, FILM COATED ORAL at 10:09

## 2022-09-20 RX ADMIN — APIXABAN 2.5 MG: 2.5 TABLET, FILM COATED ORAL at 10:09

## 2022-09-20 RX ADMIN — INSULIN DETEMIR 20 UNITS: 100 INJECTION, SOLUTION SUBCUTANEOUS at 10:09

## 2022-09-20 NOTE — ED NOTES
Patient is awake, alert and calm, NADN, RR even and unlabored, call be within reach, instructed on how to use call bell. Instructed to call for assistance and/or needs. Patient verbalized understanding. Patient denies needs or concerns at this time.

## 2022-09-20 NOTE — ED TRIAGE NOTES
C/o mid sternal chest pressure/pain since yesterday with c/o nausea. Patient reports her chest feels heavy.  Patient is on oxygen at home, but unable to tell me how many liters. Patient a poor historian.

## 2022-09-20 NOTE — ED PROVIDER NOTES
Encounter Date: 9/20/2022       History     Chief Complaint   Patient presents with    Chest Pain    Nausea     89-year-old female presents with chest pain and nausea today  Patient states she had chest pain at home prior to arrival here  Has no coronary artery disease history with marked debility  Atrial paced rhythm on EKG with no obvious ST changes noted  The patient is also nauseated which is also largely subsided    Review of patient's allergies indicates:  No Known Allergies  Past Medical History:   Diagnosis Date    A-fib     Arthritis     Diabetes mellitus     Digestive disorder     ACID REFLUX    GAVIN (dyspnea on exertion)     Encounter for blood transfusion     70 YEARS AGO PRIOR TO CHANGING TO     Gout     Hypertension     On supplemental oxygen by nasal cannula     Shingles     Vertigo      Past Surgical History:   Procedure Laterality Date    A-V CARDIAC PACEMAKER INSERTION N/A 2/24/2021    Procedure: INSERTION, MEDTRONIC CARDIAC PACEMAKER, DUAL CHAMBER;  Surgeon: Gustavo Roca MD;  Location: [facility] CATH;  Service: Cardiology;  Laterality: N/A;    CHOLECYSTECTOMY      CYSTOSCOPY W/ RETROGRADES Bilateral 7/16/2021    Procedure: CYSTOSCOPY, WITH RETROGRADE PYELOGRAM;  Surgeon: Tommy Brewer Jr., MD;  Location: [address];  Service: Urology;  Laterality: Bilateral;  1hr    HERNIA REPAIR      HYSTERECTOMY       Family History   Problem Relation Age of Onset    Cancer Mother      Social History     Tobacco Use    Smoking status: Former     Years: 20.00     Types: Cigarettes    Smokeless tobacco: Never   Substance Use Topics    Alcohol use: Not Currently     Comment: occ    Drug use: No     Review of Systems   Constitutional: Negative.    HENT: Negative.     Eyes: Negative.    Respiratory: Negative.     Cardiovascular:  Positive for chest pain.   Gastrointestinal:  Positive for nausea.   Genitourinary: Negative.    Musculoskeletal: Negative.    Skin: Negative.    Neurological: Negative.     Psychiatric/Behavioral: Negative.       Physical Exam     Initial Vitals [09/20/22 1636]   BP Pulse Resp Temp SpO2   (!) 172/87 90 20 98.5 °F (36.9 °C) 97 %      MAP       --         Physical Exam    Nursing note and vitals reviewed.  Constitutional: Vital signs are normal. She appears well-developed and well-nourished. She is cooperative.   HENT:   Head: Normocephalic and atraumatic.   Right Ear: External ear normal.   Left Ear: External ear normal.   Nose: Nose normal.   Mouth/Throat: Oropharynx is clear and moist.   Eyes: Conjunctivae, EOM and lids are normal. Pupils are equal, round, and reactive to light.   Neck: Trachea normal and phonation normal. Neck supple. No JVD present.   Normal range of motion.   Full passive range of motion without pain.     Cardiovascular:  Normal rate, regular rhythm, S1 normal, S2 normal, normal heart sounds, intact distal pulses and normal pulses.           Pulmonary/Chest: Effort normal and breath sounds normal.   Abdominal: Abdomen is soft and flat. Bowel sounds are normal.   Musculoskeletal:         General: Normal range of motion.      Cervical back: Full passive range of motion without pain, normal range of motion and neck supple.     Neurological: She is alert and oriented to person, place, and time. She has normal strength.   Skin: Skin is warm, dry and intact. Capillary refill takes less than 2 seconds.       ED Course   Procedures  Labs Reviewed   COMPREHENSIVE METABOLIC PANEL - Abnormal; Notable for the following components:       Result Value    Total Bilirubin 1.4 (*)     eGFR 46 (*)     All other components within normal limits   CBC W/ AUTO DIFFERENTIAL - Abnormal; Notable for the following components:    RDW 15.1 (*)     Gran # (ANC) 1.5 (*)     Gran % 32.6 (*)     Lymph % 48.2 (*)     Eosinophil % 11.0 (*)     All other components within normal limits   TROPONIN I - Abnormal; Notable for the following components:    Troponin I 0.047 (*)     All other components  within normal limits   B-TYPE NATRIURETIC PEPTIDE - Abnormal; Notable for the following components:     (*)     All other components within normal limits   D DIMER, QUANTITATIVE - Abnormal; Notable for the following components:    D-Dimer 0.91 (*)     All other components within normal limits   CK     EKG Readings: (Independently Interpreted)   Initial Reading: No STEMI. Rhythm: Paced Rhythm. Heart Rate: 60. Ectopy: No Ectopy. Conduction: Normal. ST Segments: Normal ST Segments. T Waves: Normal. Axis: Normal.     Imaging Results              X-Ray Chest 1 View (Final result)  Result time 09/20/22 17:23:16      Final result by Fernando Mccloud MD (09/20/22 17:23:16)                   Impression:      No definite acute radiographic abnormality or interval detrimental change as compared to the prior exam on 06/19/2022.  Bilateral chronic interstitial lung changes and right basilar atelectasis versus scarring.  Cardiomegaly.      Electronically signed by: Fernando Mccloud  Date:    09/20/2022  Time:    17:23               Narrative:    EXAMINATION:  XR CHEST 1 VIEW    CLINICAL HISTORY:  CP;    TECHNIQUE:  Single frontal view of the chest was performed.    COMPARISON:  06/19/2022    FINDINGS:  Cardiomediastinal silhouette is enlarged, unchanged.  Left anterior chest wall pacemaker device with 2 transvenous leads extending to the heart.  Thoracic aorta is tortuous with moderate calcific atherosclerosis.  Bilateral chronic interstitial lung changes.  Unchanged chronic right basilar subsegmental atelectasis versus scarring.  No definite new focal consolidation, pleural effusion, or pneumothorax.                                       Medications   aspirin tablet 325 mg (325 mg Oral Given 9/20/22 1644)     Medical Decision Making:   Initial Assessment:   Patient's chest pain and nausea prior to arrival to the ER tonight  Patient with a history of atrial fib with a pacemaker and hyperlipidemia    Differential Diagnosis:    Angina, nausea, enteritis, gastroenteritis, GERD, epigastric pain  STEMI, non STEMI, nausea and otherwise specified    Clinical Tests:   Lab Tests: Ordered and Reviewed  Radiological Study: Ordered and Reviewed  Medical Tests: Ordered and Reviewed    ED management:  Patient was slightly elevated troponin with an otherwise negative workup today  Patient will be admitted for serial cardiac enzymes, aspirin given in the ER  Patient no longer has any pain, asymptomatic prior to observation   Several comorbidities co patient meets observation criteria this evening                        Clinical Impression:   Final diagnoses:  [R07.9] Chest pain        ED Disposition Condition    Observation Stable                Fernando Baker MD  09/20/22 5148

## 2022-09-21 PROBLEM — R79.89 TROPONIN I ABOVE REFERENCE RANGE: Status: ACTIVE | Noted: 2022-09-21

## 2022-09-21 PROBLEM — I71.20 THORACIC AORTIC ANEURYSM WITHOUT RUPTURE: Status: ACTIVE | Noted: 2022-09-21

## 2022-09-21 PROBLEM — R07.9 CHEST PAIN: Status: ACTIVE | Noted: 2022-09-21

## 2022-09-21 LAB
ALBUMIN SERPL BCP-MCNC: 3.5 G/DL (ref 3.5–5.2)
ALP SERPL-CCNC: 54 U/L (ref 55–135)
ALT SERPL W/O P-5'-P-CCNC: 10 U/L (ref 10–44)
ANION GAP SERPL CALC-SCNC: 9 MMOL/L (ref 8–16)
AST SERPL-CCNC: 15 U/L (ref 10–40)
BASOPHILS # BLD AUTO: 0.03 K/UL (ref 0–0.2)
BASOPHILS NFR BLD: 0.8 % (ref 0–1.9)
BILIRUB SERPL-MCNC: 1.4 MG/DL (ref 0.1–1)
BUN SERPL-MCNC: 14 MG/DL (ref 10–30)
CALCIUM SERPL-MCNC: 9.3 MG/DL (ref 8.7–10.5)
CHLORIDE SERPL-SCNC: 105 MMOL/L (ref 95–110)
CO2 SERPL-SCNC: 27 MMOL/L (ref 23–29)
CREAT SERPL-MCNC: 1 MG/DL (ref 0.5–1.4)
DIFFERENTIAL METHOD: ABNORMAL
EOSINOPHIL # BLD AUTO: 0.5 K/UL (ref 0–0.5)
EOSINOPHIL NFR BLD: 13.7 % (ref 0–8)
ERYTHROCYTE [DISTWIDTH] IN BLOOD BY AUTOMATED COUNT: 15.2 % (ref 11.5–14.5)
EST. GFR  (NO RACE VARIABLE): 52 ML/MIN/1.73 M^2
ESTIMATED AVG GLUCOSE: 120 MG/DL (ref 68–131)
GLUCOSE SERPL-MCNC: 88 MG/DL (ref 70–110)
HBA1C MFR BLD: 5.8 % (ref 4–5.6)
HCT VFR BLD AUTO: 37.5 % (ref 37–48.5)
HGB BLD-MCNC: 12.4 G/DL (ref 12–16)
IMM GRANULOCYTES # BLD AUTO: 0.01 K/UL (ref 0–0.04)
IMM GRANULOCYTES NFR BLD AUTO: 0.3 % (ref 0–0.5)
LYMPHOCYTES # BLD AUTO: 1.4 K/UL (ref 1–4.8)
LYMPHOCYTES NFR BLD: 37.9 % (ref 18–48)
MCH RBC QN AUTO: 28.8 PG (ref 27–31)
MCHC RBC AUTO-ENTMCNC: 33.1 G/DL (ref 32–36)
MCV RBC AUTO: 87 FL (ref 82–98)
MONOCYTES # BLD AUTO: 0.3 K/UL (ref 0.3–1)
MONOCYTES NFR BLD: 8.9 % (ref 4–15)
NEUTROPHILS # BLD AUTO: 1.5 K/UL (ref 1.8–7.7)
NEUTROPHILS NFR BLD: 38.4 % (ref 38–73)
NRBC BLD-RTO: 0 /100 WBC
PLATELET # BLD AUTO: 194 K/UL (ref 150–450)
PMV BLD AUTO: 9.8 FL (ref 9.2–12.9)
POCT GLUCOSE: 105 MG/DL (ref 70–110)
POCT GLUCOSE: 73 MG/DL (ref 70–110)
POCT GLUCOSE: 84 MG/DL (ref 70–110)
POCT GLUCOSE: 86 MG/DL (ref 70–110)
POCT GLUCOSE: 95 MG/DL (ref 70–110)
POTASSIUM SERPL-SCNC: 3.7 MMOL/L (ref 3.5–5.1)
PROT SERPL-MCNC: 6.7 G/DL (ref 6–8.4)
RBC # BLD AUTO: 4.3 M/UL (ref 4–5.4)
SODIUM SERPL-SCNC: 141 MMOL/L (ref 136–145)
TROPONIN I SERPL DL<=0.01 NG/ML-MCNC: 0.05 NG/ML (ref 0–0.03)
TROPONIN I SERPL DL<=0.01 NG/ML-MCNC: 0.05 NG/ML (ref 0–0.03)
TROPONIN I SERPL DL<=0.01 NG/ML-MCNC: 0.06 NG/ML (ref 0–0.03)
TROPONIN I SERPL DL<=0.01 NG/ML-MCNC: 0.07 NG/ML (ref 0–0.03)
TSH SERPL DL<=0.005 MIU/L-ACNC: 0.69 UIU/ML (ref 0.4–4)
WBC # BLD AUTO: 3.8 K/UL (ref 3.9–12.7)

## 2022-09-21 PROCEDURE — 80053 COMPREHEN METABOLIC PANEL: CPT | Performed by: SURGERY

## 2022-09-21 PROCEDURE — 99220 PR INITIAL OBSERVATION CARE,LEVL III: ICD-10-PCS | Mod: ,,, | Performed by: FAMILY MEDICINE

## 2022-09-21 PROCEDURE — 99220 PR INITIAL OBSERVATION CARE,LEVL III: CPT | Mod: ,,, | Performed by: FAMILY MEDICINE

## 2022-09-21 PROCEDURE — G0378 HOSPITAL OBSERVATION PER HR: HCPCS

## 2022-09-21 PROCEDURE — 27000221 HC OXYGEN, UP TO 24 HOURS

## 2022-09-21 PROCEDURE — 84443 ASSAY THYROID STIM HORMONE: CPT | Performed by: FAMILY MEDICINE

## 2022-09-21 PROCEDURE — 36415 COLL VENOUS BLD VENIPUNCTURE: CPT | Performed by: SURGERY

## 2022-09-21 PROCEDURE — 25000003 PHARM REV CODE 250: Performed by: SURGERY

## 2022-09-21 PROCEDURE — 85025 COMPLETE CBC W/AUTO DIFF WBC: CPT | Performed by: SURGERY

## 2022-09-21 PROCEDURE — 25000003 PHARM REV CODE 250: Performed by: FAMILY MEDICINE

## 2022-09-21 PROCEDURE — 84484 ASSAY OF TROPONIN QUANT: CPT | Mod: 91 | Performed by: SURGERY

## 2022-09-21 PROCEDURE — 25000003 PHARM REV CODE 250: Performed by: NURSE PRACTITIONER

## 2022-09-21 PROCEDURE — 99900035 HC TECH TIME PER 15 MIN (STAT)

## 2022-09-21 PROCEDURE — 36415 COLL VENOUS BLD VENIPUNCTURE: CPT | Performed by: FAMILY MEDICINE

## 2022-09-21 PROCEDURE — 93005 ELECTROCARDIOGRAM TRACING: CPT

## 2022-09-21 PROCEDURE — 94761 N-INVAS EAR/PLS OXIMETRY MLT: CPT

## 2022-09-21 PROCEDURE — 25500020 PHARM REV CODE 255: Performed by: INTERNAL MEDICINE

## 2022-09-21 RX ORDER — SPIRONOLACTONE 25 MG/1
50 TABLET ORAL DAILY
Status: DISCONTINUED | OUTPATIENT
Start: 2022-09-21 | End: 2022-09-22 | Stop reason: HOSPADM

## 2022-09-21 RX ORDER — METOPROLOL SUCCINATE 50 MG/1
200 TABLET, EXTENDED RELEASE ORAL DAILY
Status: DISCONTINUED | OUTPATIENT
Start: 2022-09-22 | End: 2022-09-22 | Stop reason: HOSPADM

## 2022-09-21 RX ORDER — LISINOPRIL 20 MG/1
40 TABLET ORAL DAILY
Status: DISCONTINUED | OUTPATIENT
Start: 2022-09-21 | End: 2022-09-22 | Stop reason: HOSPADM

## 2022-09-21 RX ORDER — PANTOPRAZOLE SODIUM 40 MG/1
40 TABLET, DELAYED RELEASE ORAL DAILY
Status: DISCONTINUED | OUTPATIENT
Start: 2022-09-21 | End: 2022-09-22 | Stop reason: HOSPADM

## 2022-09-21 RX ADMIN — FUROSEMIDE 20 MG: 20 TABLET ORAL at 08:09

## 2022-09-21 RX ADMIN — APIXABAN 2.5 MG: 2.5 TABLET, FILM COATED ORAL at 08:09

## 2022-09-21 RX ADMIN — ASPIRIN 81 MG: 81 TABLET, CHEWABLE ORAL at 08:09

## 2022-09-21 RX ADMIN — SPIRONOLACTONE 50 MG: 25 TABLET ORAL at 11:09

## 2022-09-21 RX ADMIN — METOPROLOL SUCCINATE 100 MG: 50 TABLET, EXTENDED RELEASE ORAL at 08:09

## 2022-09-21 RX ADMIN — PANTOPRAZOLE SODIUM 40 MG: 40 TABLET, DELAYED RELEASE ORAL at 11:09

## 2022-09-21 RX ADMIN — LISINOPRIL 40 MG: 20 TABLET ORAL at 11:09

## 2022-09-21 RX ADMIN — HYDROCHLOROTHIAZIDE 25 MG: 25 TABLET ORAL at 08:09

## 2022-09-21 RX ADMIN — IOHEXOL 75 ML: 350 INJECTION, SOLUTION INTRAVENOUS at 11:09

## 2022-09-21 RX ADMIN — APIXABAN 2.5 MG: 2.5 TABLET, FILM COATED ORAL at 09:09

## 2022-09-21 RX ADMIN — ATORVASTATIN CALCIUM 20 MG: 20 TABLET, FILM COATED ORAL at 09:09

## 2022-09-21 RX ADMIN — AMLODIPINE BESYLATE 10 MG: 10 TABLET ORAL at 08:09

## 2022-09-21 NOTE — NURSING
Patient transported to 3rd floor room 312. NADN. Care assumed after report received from CLAUDIA Rodriguez. Patient is AAOx4. No complaints of chest pain or any other discomforts. Patient oriented to room, bed in low position, side rails up x2, call bell in reach. Will continue to monitor.

## 2022-09-21 NOTE — ASSESSMENT & PLAN NOTE
4.5 cm in greatest AP diameter per CTA 9/2021   Lab Results   Component Value Date    DDIMER 0.91 (H) 09/20/2022     Repeat CTA today per cardiology recommendations

## 2022-09-21 NOTE — CONSULTS
[place] - [facility] (3rd Fl)  Cardiology  Consult Note    Patient Name: Roseanne Marroquin  MRN: [id number]  Admission Date: 9/20/2022  Hospital Length of Stay: 0 days  Code Status: Full Code   Attending Provider: Gerson Delcid MD   Consulting Provider: Franchesca Cristina NP  Primary Care Physician: Liam Andrea MD  Principal Problem:<principal problem not specified>    Patient information was obtained from patient, past medical records, and ER records.     Consults  Subjective:     Chief Complaint:  CP, N/V     HPI: 89 yo AAF hx chronic AF on  Eliquis, CMO, HTN presents to ER with c/o nausea, vomiting, CP. Symptoms have largely resolved in ER.  Troponin is found to be mildly eleated at 0.06.  EKG shows V-paced rhythm.     Past Medical History:   Diagnosis Date    A-fib     Arthritis     Diabetes mellitus     Digestive disorder     ACID REFLUX    GAVIN (dyspnea on exertion)     Encounter for blood transfusion     70 YEARS AGO PRIOR TO CHANGING TO     Gout     Hypertension     On supplemental oxygen by nasal cannula     Shingles     Vertigo        Past Surgical History:   Procedure Laterality Date    A-V CARDIAC PACEMAKER INSERTION N/A 2/24/2021    Procedure: INSERTION, MEDTRONIC CARDIAC PACEMAKER, DUAL CHAMBER;  Surgeon: Gustavo Roca MD;  Location: [facility] CATH;  Service: Cardiology;  Laterality: N/A;    CHOLECYSTECTOMY      CYSTOSCOPY W/ RETROGRADES Bilateral 7/16/2021    Procedure: CYSTOSCOPY, WITH RETROGRADE PYELOGRAM;  Surgeon: Tommy Brewer Jr., MD;  Location: [facility] OR [address] [place] [address];  Service: Urology;  Laterality: Bilateral;  1hr    HERNIA REPAIR      HYSTERECTOMY         Review of patient's allergies indicates:  No Known Allergies    No current facility-administered medications on file prior to encounter.     Current Outpatient Medications on File Prior to Encounter   Medication Sig    amLODIPine (NORVASC) 10 MG tablet Take 10 mg by mouth once daily.    aspirin 81 MG Chew Take 81 mg by mouth once daily.     doxazosin (CARDURA) 1 MG tablet Take 0.5 tablets (0.5 mg total) by mouth every evening.    ELIQUIS 2.5 mg Tab Take 2.5 mg by mouth 2 (two) times daily.    furosemide (LASIX) 20 MG tablet Take 20 mg by mouth once daily.    hydroCHLOROthiazide (HYDRODIURIL) 25 MG tablet Take 25 mg by mouth once daily.    latanoprost 0.005 % ophthalmic solution Place 1 drop into both eyes nightly.    metFORMIN (GLUCOPHAGE) 500 MG tablet Take 1 tablet (500 mg total) by mouth 2 (two) times daily with meals.    metoprolol succinate (TOPROL-XL) 50 MG 24 hr tablet Take 100 mg by mouth once daily.     omeprazole (PRILOSEC) 20 MG capsule Take 20 mg by mouth once daily.    RESTASIS 0.05 % ophthalmic emulsion     valsartan (DIOVAN) 160 MG tablet Take 160 mg by mouth every evening.     albuterol (PROVENTIL/VENTOLIN HFA) 90 mcg/actuation inhaler Inhale 1-2 puffs into the lungs every 6 (six) hours as needed for Wheezing. Rescue    atorvastatin (LIPITOR) 20 MG tablet Take 1 tablet (20 mg total) by mouth every evening. (Patient not taking: No sig reported)    blood sugar diagnostic Strp To check BG 4 times daily, to use with insurance preferred meter    blood-glucose meter kit To check BG 2 times daily, to use with insurance preferred meter    lancets Misc To check BG 2 times daily, to use with insurance preferred meter     Family History       Problem Relation (Age of Onset)    Cancer Mother          Tobacco Use    Smoking status: Former     Years: 20.00     Types: Cigarettes    Smokeless tobacco: Never   Substance and Sexual Activity    Alcohol use: Not Currently     Comment: occ    Drug use: No    Sexual activity: Never     Review of Systems   Constitutional: Negative.   HENT: Negative.     Cardiovascular:  Positive for chest pain.   Respiratory:  Negative for shortness of breath.    Endocrine: Negative.    Skin: Negative.    Musculoskeletal: Negative.    Gastrointestinal:  Positive for nausea and vomiting.   Genitourinary: Negative.   "  Objective:     Vital Signs (Most Recent):  Temp: 98.1 °F (36.7 °C) (09/21/22 0830)  Pulse: 63 (09/21/22 0830)  Resp: 20 (09/21/22 0830)  BP: (!) 166/76 (09/21/22 0830)  SpO2: (!) 94 % (09/21/22 0830)   Vital Signs (24h Range):  Temp:  [97.3 °F (36.3 °C)-98.5 °F (36.9 °C)] 98.1 °F (36.7 °C)  Pulse:  [59-92] 63  Resp:  [16-20] 20  SpO2:  [93 %-100 %] 94 %  BP: (124-174)/(71-99) 166/76     Weight: 69.2 kg (152 lb 8.9 oz)  Body mass index is 26.19 kg/m².    SpO2: (!) 94 %  O2 Device (Oxygen Therapy): nasal cannula    No intake or output data in the 24 hours ending 09/21/22 0847    Lines/Drains/Airways       Peripheral Intravenous Line  Duration                  Peripheral IV - Single Lumen 09/20/22 1815 20 G Posterior;Right Forearm <1 day                    Physical Exam    Significant Labs: BMP:   Recent Labs   Lab 09/20/22 1647 09/21/22  0417   GLU 95 88    141   K 3.8 3.7    105   CO2 27 27   BUN 16 14   CREATININE 1.1 1.0   CALCIUM 9.5 9.3   , CMP   Recent Labs   Lab 09/20/22 1647 09/21/22  0417    141   K 3.8 3.7    105   CO2 27 27   GLU 95 88   BUN 16 14   CREATININE 1.1 1.0   CALCIUM 9.5 9.3   PROT 7.5 6.7   ALBUMIN 3.9 3.5   BILITOT 1.4* 1.4*   ALKPHOS 60 54*   AST 14 15   ALT 11 10   ANIONGAP 11 9   , CBC   Recent Labs   Lab 09/20/22 1647 09/21/22  0417   WBC 4.65 3.80*   HGB 13.2 12.4   HCT 40.5 37.5    194   , and Troponin   Recent Labs   Lab 09/20/22 1647 09/20/22  2216 09/21/22  0417   TROPONINI 0.047* 0.061* 0.070*       Significant Imaging: Echocardiogram: Transthoracic echo (TTE) complete (Cupid Only): No results found for this or any previous visit.  Assessment and Plan:     Chest pain/more epigastric with "gas" N/V suggestive of GERD  Elevated tropinin--minimal  HTN--suboptimal with a h/o thoracic aortic aneurysm 4.5 9/21  PPM/chronic AF on Eliquis  Echo 3/22- EF 35%  MPI 5/22 small apical scar with no ischemia  Home O2/ high DDimer with no PE on CT " 2016    Plan:  Chest CT to evaluate TAA  low suspicion for ACS  Cont home ASA/Eliquis/tejcvaceng63/lasix.  Toprol XL 200mg poqday, resume spironolactone 50mg po qday, DC HCTZ  Lisinopril OR Olmesartan 40 (Valsartan 320 in hospital)  Add Protonix 40 mg qd    There are no hospital problems to display for this patient.      VTE Risk Mitigation (From admission, onward)           Ordered     apixaban tablet 2.5 mg  2 times daily         09/20/22 2111     IP VTE HIGH RISK PATIENT  Once         09/20/22 2111     Place sequential compression device  Until discontinued         09/20/22 2111                    Thank you for your consult. I will follow-up with patient. Please contact us if you have any additional questions.    Franchesca Cristina NP scribe for TANIA Nowak MD  Cardiology   [place] - [facility] (3rd Fl)  I attest that I have personally seen and examined this patient. I have reviewed and discussed the management in detail as outlined above.

## 2022-09-21 NOTE — H&P
"[facility] ([address] [place] [address] Medicine  History & Physical    Patient Name: Roseanne Marroquin  MRN: [id number]  Patient Class: OP- Observation  Admission Date: 9/20/2022  Attending Physician: Joe Jiang MD  Primary Care Provider: Liam Andrea MD         Patient information was obtained from patient and ER records.     Subjective:     Principal Problem:Chest pain    Chief Complaint:   Chief Complaint   Patient presents with    Chest Pain    Nausea        HPI:   Roseanne Marroquin is a 89 y.o. female, with known AV block/ Bradycardia s/p CARDIAC PACEMAKER, DUAL CHAMBER 2/24/21, PAF (paroxysmal atrial fibrillation), HTN, Dyslipidemia,  Bilateral carotid artery stenosis, aneurysmal dilatation of the thoracic aorta, Type II DM, presented to ED yesterday  with chest pain and nausea.   Patient states she had chest pain at home prior to arrival here.   She describes pain as "feels like indigestion" , like " I cant digest my food" " reflux" , symptoms resolve with belching. States " I make myself vomit sometimes"   Has no coronary artery disease history with marked debility  Atrial paced rhythm on EKG with no obvious ST changes noted  The patient is also nauseated which is also largely subsided  TNI 0.061>0.070   Recent Labs   Lab 09/21/22  0417   TROPONINI 0.070*     D- dimer 0.91 ; cardiology recommending CTA       Past Medical History:   Diagnosis Date    A-fib     Arthritis     Diabetes mellitus     Digestive disorder     ACID REFLUX    GAVIN (dyspnea on exertion)     Encounter for blood transfusion     70 YEARS AGO PRIOR TO CHANGING TO     Gout     Hypertension     On supplemental oxygen by nasal cannula     Shingles     Vertigo        Past Surgical History:   Procedure Laterality Date    A-V CARDIAC PACEMAKER INSERTION N/A 2/24/2021    Procedure: INSERTION, MEDTRONIC CARDIAC PACEMAKER, DUAL CHAMBER;  Surgeon: Gustavo Roca MD;  Location: [facility] CATH;  Service: Cardiology;  Laterality: N/A; "    CHOLECYSTECTOMY      CYSTOSCOPY W/ RETROGRADES Bilateral 7/16/2021    Procedure: CYSTOSCOPY, WITH RETROGRADE PYELOGRAM;  Surgeon: Tommy Brewer Jr., MD;  Location: [facility] OR [address] [place] [address];  Service: Urology;  Laterality: Bilateral;  1hr    HERNIA REPAIR      HYSTERECTOMY         Review of patient's allergies indicates:  No Known Allergies    No current facility-administered medications on file prior to encounter.     Current Outpatient Medications on File Prior to Encounter   Medication Sig    amLODIPine (NORVASC) 10 MG tablet Take 10 mg by mouth once daily.    aspirin 81 MG Chew Take 81 mg by mouth once daily.    doxazosin (CARDURA) 1 MG tablet Take 0.5 tablets (0.5 mg total) by mouth every evening.    ELIQUIS 2.5 mg Tab Take 2.5 mg by mouth 2 (two) times daily.    furosemide (LASIX) 20 MG tablet Take 20 mg by mouth once daily.    hydroCHLOROthiazide (HYDRODIURIL) 25 MG tablet Take 25 mg by mouth once daily.    latanoprost 0.005 % ophthalmic solution Place 1 drop into both eyes nightly.    metFORMIN (GLUCOPHAGE) 500 MG tablet Take 1 tablet (500 mg total) by mouth 2 (two) times daily with meals.    metoprolol succinate (TOPROL-XL) 50 MG 24 hr tablet Take 100 mg by mouth once daily.     omeprazole (PRILOSEC) 20 MG capsule Take 20 mg by mouth once daily.    RESTASIS 0.05 % ophthalmic emulsion     valsartan (DIOVAN) 160 MG tablet Take 160 mg by mouth every evening.     albuterol (PROVENTIL/VENTOLIN HFA) 90 mcg/actuation inhaler Inhale 1-2 puffs into the lungs every 6 (six) hours as needed for Wheezing. Rescue    atorvastatin (LIPITOR) 20 MG tablet Take 1 tablet (20 mg total) by mouth every evening. (Patient not taking: No sig reported)    blood sugar diagnostic Strp To check BG 4 times daily, to use with insurance preferred meter    blood-glucose meter kit To check BG 2 times daily, to use with insurance preferred meter    lancets Misc To check BG 2 times daily, to use with insurance preferred meter  "    Family History       Problem Relation (Age of Onset)    Cancer Mother          Tobacco Use    Smoking status: Former     Years: 20.00     Types: Cigarettes    Smokeless tobacco: Never   Substance and Sexual Activity    Alcohol use: Not Currently     Comment: occ    Drug use: No    Sexual activity: Never     Review of Systems   Constitutional:  Negative for chills and fever.   Respiratory:  Positive for shortness of breath.    Cardiovascular:  Positive for chest pain and leg swelling. Negative for palpitations.   Gastrointestinal:  Positive for nausea. Negative for abdominal pain, blood in stool and constipation.        "C/ reflux,    Genitourinary:  Negative for difficulty urinating.   Psychiatric/Behavioral:  Negative for dysphoric mood, sleep disturbance and suicidal ideas. The patient is not nervous/anxious.    Objective:     Vital Signs (Most Recent):  Temp: 98.1 °F (36.7 °C) (09/21/22 0830)  Pulse: 63 (09/21/22 0830)  Resp: 20 (09/21/22 0830)  BP: (!) 166/76 (09/21/22 0830)  SpO2: (!) 94 % (09/21/22 0830)   Vital Signs (24h Range):  Temp:  [97.3 °F (36.3 °C)-98.5 °F (36.9 °C)] 98.1 °F (36.7 °C)  Pulse:  [59-92] 63  Resp:  [16-20] 20  SpO2:  [93 %-100 %] 94 %  BP: (124-174)/(71-99) 166/76     Weight: 69.2 kg (152 lb 8.9 oz)  Body mass index is 26.19 kg/m².    Physical Exam  Constitutional:       Appearance: She is well-developed.   HENT:      Head: Normocephalic and atraumatic.      Right Ear: External ear normal.      Left Ear: External ear normal.      Nose: Nose normal.   Eyes:      Extraocular Movements: EOM normal.      Pupils: Pupils are equal, round, and reactive to light.   Neck:      Thyroid: No thyromegaly.      Vascular: No JVD.      Trachea: No tracheal deviation.   Cardiovascular:      Rate and Rhythm: Normal rate.      Heart sounds: Normal heart sounds. No murmur heard.  Pulmonary:      Effort: Pulmonary effort is normal. No respiratory distress.      Breath sounds: Normal breath sounds. " No wheezing or rales.   Chest:      Chest wall: No tenderness.   Abdominal:      General: Bowel sounds are normal. There is no distension.      Palpations: Abdomen is soft. There is no mass.      Tenderness: There is no abdominal tenderness. There is no guarding or rebound.   Musculoskeletal:         General: No tenderness. Normal range of motion.      Cervical back: Normal range of motion and neck supple.      Right lower leg: Edema present.      Left lower leg: Edema present.      Comments: Trace edema      Lymphadenopathy:      Cervical: No cervical adenopathy.   Skin:     General: Skin is warm and dry.      Coloration: Skin is not pale.      Findings: No erythema or rash.   Neurological:      Mental Status: She is alert and oriented to person, place, and time.      Cranial Nerves: No cranial nerve deficit.      Motor: No abnormal muscle tone.      Coordination: Coordination normal.      Deep Tendon Reflexes: Reflexes are normal and symmetric. Reflexes normal.   Psychiatric:         Behavior: Behavior normal.         Thought Content: Thought content normal.         Judgment: Judgment normal.         CRANIAL NERVES     CN III, IV, VI   Pupils are equal, round, and reactive to light.  Extraocular motions are normal.      Significant Labs: All pertinent labs within the past 24 hours have been reviewed.  A1C:   Recent Labs   Lab 09/20/22  2216   HGBA1C 5.8*     ABGs: No results for input(s): PH, PCO2, HCO3, POCSATURATED, BE, TOTALHB, COHB, METHB, O2HB, POCFIO2, PO2 in the last 48 hours.  Bilirubin:   Recent Labs   Lab 09/20/22 1647 09/21/22 0417   BILITOT 1.4* 1.4*     Blood Culture: No results for input(s): LABBLOO in the last 48 hours.  CBC:   Recent Labs   Lab 09/20/22 1647 09/21/22 0417   WBC 4.65 3.80*   HGB 13.2 12.4   HCT 40.5 37.5    194     CMP:   Recent Labs   Lab 09/20/22 1647 09/21/22 0417    141   K 3.8 3.7    105   CO2 27 27   GLU 95 88   BUN 16 14   CREATININE 1.1 1.0   CALCIUM  9.5 9.3   PROT 7.5 6.7   ALBUMIN 3.9 3.5   BILITOT 1.4* 1.4*   ALKPHOS 60 54*   AST 14 15   ALT 11 10   ANIONGAP 11 9     Cardiac Markers:   Recent Labs   Lab 09/20/22  1647   *     Lactic Acid: No results for input(s): LACTATE in the last 48 hours.  Lipid Panel: No results for input(s): CHOL, HDL, LDLCALC, TRIG, CHOLHDL in the last 48 hours.  Magnesium: No results for input(s): MG in the last 48 hours.  POCT Glucose:   Recent Labs   Lab 09/21/22  0720 09/21/22  0817   POCTGLUCOSE 73 105     Troponin:   Recent Labs   Lab 09/20/22  1647 09/20/22  2216 09/21/22  0417   TROPONINI 0.047* 0.061* 0.070*     TSH: No results for input(s): TSH in the last 4320 hours.  Urine Culture: No results for input(s): LABURIN in the last 48 hours.  Urine Studies: No results for input(s): COLORU, APPEARANCEUA, PHUR, SPECGRAV, PROTEINUA, GLUCUA, KETONESU, BILIRUBINUA, OCCULTUA, NITRITE, UROBILINOGEN, LEUKOCYTESUR, RBCUA, WBCUA, BACTERIA, SQUAMEPITHEL, HYALINECASTS in the last 48 hours.    Invalid input(s): WRIGHTSUR    Significant Imaging: I have reviewed and interpreted all pertinent imaging results/findings within the past 24 hours.    Assessment/Plan:     * Chest pain  DDX- epigastric  Cardiology consulted  Continue statin, asa, eliquis low dose   Add PPI       Thoracic aortic aneurysm without rupture  4.5 cm in greatest AP diameter per CTA 9/2021   Lab Results   Component Value Date    DDIMER 0.91 (H) 09/20/2022     Repeat CTA today per cardiology recommendations    Troponin I above reference range  Trending down  Cardiology consulted       Chronic diastolic heart failure    No acute symptoms      Pacemaker  Paced   Hx PAF on eliquis 2.5mg bid       Mixed hyperlipidemia  Atorvastatin 20mg q PM       Essential hypertension  Home Rx ; amlodipine 10mg daily  cardsura 1mg 0.5md q PM  HCTZ 25mg daily, valsartan 160mg daily   toprol XL 100mg po daily     Type 2 diabetes with nephropathy  Patient's FSGs are controlled on  current medication regimen.  Last A1c reviewed-   Lab Results   Component Value Date    HGBA1C 5.8 (H) 09/20/2022     Most recent fingerstick glucose reviewed-   Recent Labs   Lab 09/21/22 0720 09/21/22  0817   POCTGLUCOSE 73 105     Current correctional scale  Low  Maintain anti-hyperglycemic dose as follows-   Antihyperglycemics (From admission, onward)    Start     Stop Route Frequency Ordered    09/21/22 0715  insulin aspart U-100 pen 6 Units         -- SubQ 3 times daily with meals 09/20/22 2111 09/20/22 2115  insulin detemir U-100 pen 20 Units         -- SubQ Nightly 09/20/22 2111 09/20/22 2111  insulin aspart U-100 pen 0-5 Units         -- SubQ Before meals & nightly PRN 09/20/22 2111        Hold Oral hypoglycemics while patient is in the hospital.      VTE Risk Mitigation (From admission, onward)         Ordered     apixaban tablet 2.5 mg  2 times daily         09/20/22 2111     IP VTE HIGH RISK PATIENT  Once         09/20/22 2111     Place sequential compression device  Until discontinued         09/20/22 2111                   Joe Jiang MD  Department of Hospital Medicine   [place] [facility] (3rd Fl)

## 2022-09-21 NOTE — HPI
"Roseanne Marroquin is a 89 y.o. female, with known AV block/ Bradycardia s/p CARDIAC PACEMAKER, DUAL CHAMBER 2/24/21, PAF (paroxysmal atrial fibrillation), HTN, Dyslipidemia,  Bilateral carotid artery stenosis, aneurysmal dilatation of the thoracic aorta, Type II DM, presented to ED yesterday  with chest pain and nausea.   Patient states she had chest pain at home prior to arrival here.   She describes pain as "feels like indigestion" , like " I cant digest my food" " reflux" , symptoms resolve with belching. States " I make myself vomit sometimes"   Has no coronary artery disease history with marked debility  Atrial paced rhythm on EKG with no obvious ST changes noted  The patient is also nauseated which is also largely subsided  TNI 0.061>0.070   Recent Labs   Lab 09/21/22  0417   TROPONINI 0.070*     D- dimer 0.91 ; cardiology recommending CTA   "

## 2022-09-21 NOTE — SUBJECTIVE & OBJECTIVE
Past Medical History:   Diagnosis Date    A-fib     Arthritis     Diabetes mellitus     Digestive disorder     ACID REFLUX    GAVIN (dyspnea on exertion)     Encounter for blood transfusion     70 YEARS AGO PRIOR TO CHANGING TO     Gout     Hypertension     On supplemental oxygen by nasal cannula     Shingles     Vertigo        Past Surgical History:   Procedure Laterality Date    A-V CARDIAC PACEMAKER INSERTION N/A 2/24/2021    Procedure: INSERTION, MEDTRONIC CARDIAC PACEMAKER, DUAL CHAMBER;  Surgeon: Gustavo Roca MD;  Location: [facility] CATH;  Service: Cardiology;  Laterality: N/A;    CHOLECYSTECTOMY      CYSTOSCOPY W/ RETROGRADES Bilateral 7/16/2021    Procedure: CYSTOSCOPY, WITH RETROGRADE PYELOGRAM;  Surgeon: Tommy Brewer Jr., MD;  Location: [facility] OR [address] [place] [address];  Service: Urology;  Laterality: Bilateral;  1hr    HERNIA REPAIR      HYSTERECTOMY         Review of patient's allergies indicates:  No Known Allergies    No current facility-administered medications on file prior to encounter.     Current Outpatient Medications on File Prior to Encounter   Medication Sig    amLODIPine (NORVASC) 10 MG tablet Take 10 mg by mouth once daily.    aspirin 81 MG Chew Take 81 mg by mouth once daily.    doxazosin (CARDURA) 1 MG tablet Take 0.5 tablets (0.5 mg total) by mouth every evening.    ELIQUIS 2.5 mg Tab Take 2.5 mg by mouth 2 (two) times daily.    furosemide (LASIX) 20 MG tablet Take 20 mg by mouth once daily.    hydroCHLOROthiazide (HYDRODIURIL) 25 MG tablet Take 25 mg by mouth once daily.    latanoprost 0.005 % ophthalmic solution Place 1 drop into both eyes nightly.    metFORMIN (GLUCOPHAGE) 500 MG tablet Take 1 tablet (500 mg total) by mouth 2 (two) times daily with meals.    metoprolol succinate (TOPROL-XL) 50 MG 24 hr tablet Take 100 mg by mouth once daily.     omeprazole (PRILOSEC) 20 MG capsule Take 20 mg by mouth once daily.    RESTASIS 0.05 % ophthalmic emulsion     valsartan (DIOVAN) 160 MG tablet Take 160 mg  "by mouth every evening.     albuterol (PROVENTIL/VENTOLIN HFA) 90 mcg/actuation inhaler Inhale 1-2 puffs into the lungs every 6 (six) hours as needed for Wheezing. Rescue    atorvastatin (LIPITOR) 20 MG tablet Take 1 tablet (20 mg total) by mouth every evening. (Patient not taking: No sig reported)    blood sugar diagnostic Strp To check BG 4 times daily, to use with insurance preferred meter    blood-glucose meter kit To check BG 2 times daily, to use with insurance preferred meter    lancets Misc To check BG 2 times daily, to use with insurance preferred meter     Family History       Problem Relation (Age of Onset)    Cancer Mother          Tobacco Use    Smoking status: Former     Years: 20.00     Types: Cigarettes    Smokeless tobacco: Never   Substance and Sexual Activity    Alcohol use: Not Currently     Comment: occ    Drug use: No    Sexual activity: Never     Review of Systems   Constitutional:  Negative for chills and fever.   Respiratory:  Positive for shortness of breath.    Cardiovascular:  Positive for chest pain and leg swelling. Negative for palpitations.   Gastrointestinal:  Positive for nausea. Negative for abdominal pain, blood in stool and constipation.        "C/ reflux,    Genitourinary:  Negative for difficulty urinating.   Psychiatric/Behavioral:  Negative for dysphoric mood, sleep disturbance and suicidal ideas. The patient is not nervous/anxious.    Objective:     Vital Signs (Most Recent):  Temp: 98.1 °F (36.7 °C) (09/21/22 0830)  Pulse: 63 (09/21/22 0830)  Resp: 20 (09/21/22 0830)  BP: (!) 166/76 (09/21/22 0830)  SpO2: (!) 94 % (09/21/22 0830)   Vital Signs (24h Range):  Temp:  [97.3 °F (36.3 °C)-98.5 °F (36.9 °C)] 98.1 °F (36.7 °C)  Pulse:  [59-92] 63  Resp:  [16-20] 20  SpO2:  [93 %-100 %] 94 %  BP: (124-174)/(71-99) 166/76     Weight: 69.2 kg (152 lb 8.9 oz)  Body mass index is 26.19 kg/m².    Physical Exam  Constitutional:       Appearance: She is well-developed.   HENT:      Head: " Normocephalic and atraumatic.      Right Ear: External ear normal.      Left Ear: External ear normal.      Nose: Nose normal.   Eyes:      Extraocular Movements: EOM normal.      Pupils: Pupils are equal, round, and reactive to light.   Neck:      Thyroid: No thyromegaly.      Vascular: No JVD.      Trachea: No tracheal deviation.   Cardiovascular:      Rate and Rhythm: Normal rate.      Heart sounds: Normal heart sounds. No murmur heard.  Pulmonary:      Effort: Pulmonary effort is normal. No respiratory distress.      Breath sounds: Normal breath sounds. No wheezing or rales.   Chest:      Chest wall: No tenderness.   Abdominal:      General: Bowel sounds are normal. There is no distension.      Palpations: Abdomen is soft. There is no mass.      Tenderness: There is no abdominal tenderness. There is no guarding or rebound.   Musculoskeletal:         General: No tenderness. Normal range of motion.      Cervical back: Normal range of motion and neck supple.      Right lower leg: Edema present.      Left lower leg: Edema present.      Comments: Trace edema      Lymphadenopathy:      Cervical: No cervical adenopathy.   Skin:     General: Skin is warm and dry.      Coloration: Skin is not pale.      Findings: No erythema or rash.   Neurological:      Mental Status: She is alert and oriented to person, place, and time.      Cranial Nerves: No cranial nerve deficit.      Motor: No abnormal muscle tone.      Coordination: Coordination normal.      Deep Tendon Reflexes: Reflexes are normal and symmetric. Reflexes normal.   Psychiatric:         Behavior: Behavior normal.         Thought Content: Thought content normal.         Judgment: Judgment normal.         CRANIAL NERVES     CN III, IV, VI   Pupils are equal, round, and reactive to light.  Extraocular motions are normal.      Significant Labs: All pertinent labs within the past 24 hours have been reviewed.  A1C:   Recent Labs   Lab 09/20/22  2216   HGBA1C 5.8*      ABGs: No results for input(s): PH, PCO2, HCO3, POCSATURATED, BE, TOTALHB, COHB, METHB, O2HB, POCFIO2, PO2 in the last 48 hours.  Bilirubin:   Recent Labs   Lab 09/20/22 1647 09/21/22 0417   BILITOT 1.4* 1.4*     Blood Culture: No results for input(s): LABBLOO in the last 48 hours.  CBC:   Recent Labs   Lab 09/20/22 1647 09/21/22 0417   WBC 4.65 3.80*   HGB 13.2 12.4   HCT 40.5 37.5    194     CMP:   Recent Labs   Lab 09/20/22 1647 09/21/22 0417    141   K 3.8 3.7    105   CO2 27 27   GLU 95 88   BUN 16 14   CREATININE 1.1 1.0   CALCIUM 9.5 9.3   PROT 7.5 6.7   ALBUMIN 3.9 3.5   BILITOT 1.4* 1.4*   ALKPHOS 60 54*   AST 14 15   ALT 11 10   ANIONGAP 11 9     Cardiac Markers:   Recent Labs   Lab 09/20/22 1647   *     Lactic Acid: No results for input(s): LACTATE in the last 48 hours.  Lipid Panel: No results for input(s): CHOL, HDL, LDLCALC, TRIG, CHOLHDL in the last 48 hours.  Magnesium: No results for input(s): MG in the last 48 hours.  POCT Glucose:   Recent Labs   Lab 09/21/22  0720 09/21/22  0817   POCTGLUCOSE 73 105     Troponin:   Recent Labs   Lab 09/20/22 1647 09/20/22  2216 09/21/22  0417   TROPONINI 0.047* 0.061* 0.070*     TSH: No results for input(s): TSH in the last 4320 hours.  Urine Culture: No results for input(s): LABURIN in the last 48 hours.  Urine Studies: No results for input(s): COLORU, APPEARANCEUA, PHUR, SPECGRAV, PROTEINUA, GLUCUA, KETONESU, BILIRUBINUA, OCCULTUA, NITRITE, UROBILINOGEN, LEUKOCYTESUR, RBCUA, WBCUA, BACTERIA, SQUAMEPITHEL, HYALINECASTS in the last 48 hours.    Invalid input(s): WRIGHTSUR    Significant Imaging: I have reviewed and interpreted all pertinent imaging results/findings within the past 24 hours.

## 2022-09-21 NOTE — ASSESSMENT & PLAN NOTE
Home Rx ; amlodipine 10mg daily  cardsura 1mg 0.5md q PM  HCTZ 25mg daily, valsartan 160mg daily   toprol XL 100mg po daily

## 2022-09-21 NOTE — PLAN OF CARE
[place] - Med Surg (3rd Fl)  Initial Discharge Assessment       Primary Care Provider: Liam Andrea MD    Admission Diagnosis: Abnormal EKG [R94.31]  Chest pain [R07.9]    Admission Date: 9/20/2022  Expected Discharge Date:     Discharge Barriers Identified: None    Payor: MEDICARE / Plan: MEDICARE PART A & B / Product Type: Government /     Extended Emergency Contact Information  Primary Emergency Contact: Stevo Marroquin   [place]  Mobile Phone: [phone]  Relation: Son  Secondary Emergency Contact: Manisha Du  Mobile Phone: [phone]  Relation: Grandchild    Discharge Plan A: Home  Discharge Plan B: Home, Home with family      [facility] [address]  [address]  [facility] [address]  Phone: [phone] Fax: [phone]    [id number] - [place] - [address]  [address]  [facility] [address]  Phone: [phone] Fax: [phone]      Initial Assessment (most recent)       Adult Discharge Assessment - 09/21/22 1059          Discharge Assessment    Assessment Type Discharge Planning Assessment     Confirmed/corrected address, phone number and insurance Yes     Confirmed Demographics Correct on Facesheet     Source of Information patient     Communicated RAMIREZ with patient/caregiver Yes   MD communicated    Reason For Admission Chest Pain     Lives With alone     Facility Arrived From: Home     Do you expect to return to your current living situation? Yes     Do you have help at home or someone to help you manage your care at home? Yes     Who are your caregiver(s) and their phone number(s)? Stevo Marroquin (Son) [phone]     Prior to hospitilization cognitive status: Alert/Oriented     Current cognitive status: Alert/Oriented     Walking or Climbing Stairs Difficulty ambulation difficulty, requires equipment     Dressing/Bathing Difficulty bathing difficulty, assistance 1 person      Equipment Currently Used at Home rollator;oxygen     Readmission within 30 days? No     Patient currently being followed by outpatient case management? No     Do you currently have service(s) that help you manage your care at home? Yes     Name and Contact number of agency See notes     Is the pt/caregiver preference to resume services with current agency Yes     Do you take prescription medications? Yes     Do you have prescription coverage? Yes     Coverage Medicaid of [place]     Do you have any problems affording any of your prescribed medications? No     How do you get to doctors appointments? family or friend will provide     Are you on dialysis? No     Do you take coumadin? No     Discharge Plan A Home     Discharge Plan B Home;Home with family     DME Needed Upon Discharge  none     Discharge Barriers Identified None                        Discharge assessment completed briefly as patient was being taken to [facility] at time of assessment attempt. Patient reports having an aide come to her home 5 days a week to assist with ADLs. This is assumed to be a Medicaid Waiver Program and not Home Health. She states that this is with [facility]. No other post-acute care needs identified at this time. SW to remain available.

## 2022-09-21 NOTE — PLAN OF CARE
09/21/22 1158   BURTON Message   Medicare Outpatient and Observation Notification regarding financial responsibility Given to patient/caregiver;Explained to patient/caregiver;Signed/date by patient/caregiver   Date BURTON was signed 09/21/22   Time BURTON was signed 1030     Ms Marroquin is here with R/O MI. She will discharge home with no post acute care needs.CM discussed signs and symptoms requiring a return to ED.

## 2022-09-21 NOTE — ASSESSMENT & PLAN NOTE
Patient's FSGs are controlled on current medication regimen.  Last A1c reviewed-   Lab Results   Component Value Date    HGBA1C 5.8 (H) 09/20/2022     Most recent fingerstick glucose reviewed-   Recent Labs   Lab 09/21/22 0720 09/21/22  0817   POCTGLUCOSE 73 105     Current correctional scale  Low  Maintain anti-hyperglycemic dose as follows-   Antihyperglycemics (From admission, onward)    Start     Stop Route Frequency Ordered    09/21/22 0715  insulin aspart U-100 pen 6 Units         -- SubQ 3 times daily with meals 09/20/22 2111 09/20/22 2115  insulin detemir U-100 pen 20 Units         -- SubQ Nightly 09/20/22 2111 09/20/22 2111  insulin aspart U-100 pen 0-5 Units         -- SubQ Before meals & nightly PRN 09/20/22 2111        Hold Oral hypoglycemics while patient is in the hospital.

## 2022-09-21 NOTE — ED NOTES
Assumed care from CLAUDIA Diaz. Patient  in ED stretcher - awake, alert, oriented.  Airway, breathing, circulation intact. No acute distress noted. PIV site intact - 20g RFA. Plan of care discussed with patient family - pt awaiting inpatient room assignment.  Patient denies any needs at this time. Cardiac monitoring, continuous pulse ox and blood pressure monitoring in place. No needs at this time, encouraged to call for needs.

## 2022-09-22 VITALS
SYSTOLIC BLOOD PRESSURE: 134 MMHG | HEART RATE: 82 BPM | WEIGHT: 152.56 LBS | RESPIRATION RATE: 18 BRPM | BODY MASS INDEX: 26.05 KG/M2 | OXYGEN SATURATION: 94 % | HEIGHT: 64 IN | TEMPERATURE: 97 F | DIASTOLIC BLOOD PRESSURE: 71 MMHG

## 2022-09-22 PROBLEM — E07.9 THYROID MASS: Status: ACTIVE | Noted: 2022-09-22

## 2022-09-22 LAB
POCT GLUCOSE: 102 MG/DL (ref 70–110)
POCT GLUCOSE: 86 MG/DL (ref 70–110)
TROPONIN I SERPL DL<=0.01 NG/ML-MCNC: 0.05 NG/ML (ref 0–0.03)

## 2022-09-22 PROCEDURE — G0378 HOSPITAL OBSERVATION PER HR: HCPCS

## 2022-09-22 PROCEDURE — 25000003 PHARM REV CODE 250: Performed by: FAMILY MEDICINE

## 2022-09-22 PROCEDURE — 36415 COLL VENOUS BLD VENIPUNCTURE: CPT | Performed by: SURGERY

## 2022-09-22 PROCEDURE — 25000003 PHARM REV CODE 250: Performed by: NURSE PRACTITIONER

## 2022-09-22 PROCEDURE — G0008 ADMIN INFLUENZA VIRUS VAC: HCPCS | Performed by: INTERNAL MEDICINE

## 2022-09-22 PROCEDURE — 99217 PR OBSERVATION CARE DISCHARGE: ICD-10-PCS | Mod: ,,, | Performed by: FAMILY MEDICINE

## 2022-09-22 PROCEDURE — 94760 N-INVAS EAR/PLS OXIMETRY 1: CPT

## 2022-09-22 PROCEDURE — 90694 VACC AIIV4 NO PRSRV 0.5ML IM: CPT | Performed by: INTERNAL MEDICINE

## 2022-09-22 PROCEDURE — 84484 ASSAY OF TROPONIN QUANT: CPT | Performed by: SURGERY

## 2022-09-22 PROCEDURE — 25000003 PHARM REV CODE 250: Performed by: SURGERY

## 2022-09-22 PROCEDURE — 90471 IMMUNIZATION ADMIN: CPT | Performed by: INTERNAL MEDICINE

## 2022-09-22 PROCEDURE — 99217 PR OBSERVATION CARE DISCHARGE: CPT | Mod: ,,, | Performed by: FAMILY MEDICINE

## 2022-09-22 PROCEDURE — 63600175 PHARM REV CODE 636 W HCPCS: Performed by: INTERNAL MEDICINE

## 2022-09-22 RX ORDER — PANTOPRAZOLE SODIUM 40 MG/1
40 TABLET, DELAYED RELEASE ORAL DAILY
Qty: 30 TABLET | Refills: 11 | Status: SHIPPED | OUTPATIENT
Start: 2022-09-22 | End: 2023-09-22

## 2022-09-22 RX ORDER — METOPROLOL SUCCINATE 200 MG/1
200 TABLET, EXTENDED RELEASE ORAL DAILY
Qty: 30 TABLET | Refills: 11 | Status: SHIPPED | OUTPATIENT
Start: 2022-09-23 | End: 2023-09-23

## 2022-09-22 RX ADMIN — INFLUENZA A VIRUS A/VICTORIA/2570/2019 IVR-215 (H1N1) ANTIGEN (FORMALDEHYDE INACTIVATED), INFLUENZA A VIRUS A/DARWIN/6/2021 IVR-227 (H3N2) ANTIGEN (FORMALDEHYDE INACTIVATED), INFLUENZA B VIRUS B/AUSTRIA/1359417/2021 BVR-26 ANTIGEN (FORMALDEHYDE INACTIVATED), INFLUENZA B VIRUS B/PHUKET/3073/2013 BVR-1B ANTIGEN (FORMALDEHYDE INACTIVATED) 0.5 ML: 15; 15; 15; 15 INJECTION, SUSPENSION INTRAMUSCULAR at 11:09

## 2022-09-22 RX ADMIN — LISINOPRIL 40 MG: 20 TABLET ORAL at 08:09

## 2022-09-22 RX ADMIN — APIXABAN 2.5 MG: 2.5 TABLET, FILM COATED ORAL at 08:09

## 2022-09-22 RX ADMIN — ASPIRIN 81 MG: 81 TABLET, CHEWABLE ORAL at 08:09

## 2022-09-22 RX ADMIN — SPIRONOLACTONE 50 MG: 25 TABLET ORAL at 08:09

## 2022-09-22 RX ADMIN — AMLODIPINE BESYLATE 10 MG: 10 TABLET ORAL at 08:09

## 2022-09-22 RX ADMIN — METOPROLOL SUCCINATE 200 MG: 50 TABLET, EXTENDED RELEASE ORAL at 08:09

## 2022-09-22 RX ADMIN — FUROSEMIDE 20 MG: 20 TABLET ORAL at 08:09

## 2022-09-22 RX ADMIN — PANTOPRAZOLE SODIUM 40 MG: 40 TABLET, DELAYED RELEASE ORAL at 08:09

## 2022-09-22 NOTE — ASSESSMENT & PLAN NOTE
Patient's FSGs are controlled on current medication regimen.  Last A1c reviewed-   Lab Results   Component Value Date    HGBA1C 5.8 (H) 09/20/2022     Most recent fingerstick glucose reviewed-   Recent Labs   Lab 09/21/22  1542 09/21/22 2006 09/22/22  0711 09/22/22  1130   POCTGLUCOSE 95 84 86 102     Current correctional scale  Low  Maintain anti-hyperglycemic dose as follows-   Antihyperglycemics (From admission, onward)    Start     Stop Route Frequency Ordered    09/21/22 0715  insulin aspart U-100 pen 6 Units         -- SubQ 3 times daily with meals 09/20/22 2111 09/20/22 2115  insulin detemir U-100 pen 20 Units         -- SubQ Nightly 09/20/22 2111 09/20/22 2111  insulin aspart U-100 pen 0-5 Units         -- SubQ Before meals & nightly PRN 09/20/22 2111        Hold Oral hypoglycemics while patient is in the hospital.

## 2022-09-22 NOTE — ASSESSMENT & PLAN NOTE
Patient's FSGs are controlled on current medication regimen.  Last A1c reviewed-   Lab Results   Component Value Date    HGBA1C 5.8 (H) 09/20/2022     Most recent fingerstick glucose reviewed-   Recent Labs   Lab 09/21/22  1119 09/21/22  1542 09/21/22 2006 09/22/22  0711   POCTGLUCOSE 86 95 84 86     Current correctional scale  Low  Maintain anti-hyperglycemic dose as follows-   Antihyperglycemics (From admission, onward)    Start     Stop Route Frequency Ordered    09/21/22 0715  insulin aspart U-100 pen 6 Units         -- SubQ 3 times daily with meals 09/20/22 2111 09/20/22 2115  insulin detemir U-100 pen 20 Units         -- SubQ Nightly 09/20/22 2111 09/20/22 2111  insulin aspart U-100 pen 0-5 Units         -- SubQ Before meals & nightly PRN 09/20/22 2111        Hold Oral hypoglycemics while patient is in the hospital.

## 2022-09-22 NOTE — ASSESSMENT & PLAN NOTE
Home Rx ; amlodipine 10mg daily  cardsura 1mg 0.5md q PM> held while here   HCTZ 25mg daily, valsartan 160mg daily > held while here  toprol XL 100mg po daily > increased to 200   F/u Dr Nowak

## 2022-09-22 NOTE — ASSESSMENT & PLAN NOTE
Likely causing her food to get stuck and worsening reflux like symptoms. Will f/u outpt ENT  Lab Results   Component Value Date    TSH 0.688 09/21/2022

## 2022-09-22 NOTE — DISCHARGE SUMMARY
"[facility] ([facility])  [facility] Medicine  Discharge Summary  Patient was seen by me and I agree with note by NP below.  I spoke to patient and grandson at bedside reviewing that it was important to f/u with ENT regarding thyroid mass.    Patient Name: Roseanne Marroquin  MRN: [id number]  Patient Class: OP- Observation  Admission Date: 9/20/2022  Hospital Length of Stay: 0 days  Discharge Date and Time:  09/22/2022 11:52 AM  Attending Physician: Gerson Delcid MD   Discharging Provider: Velma Casillas NP  Primary Care Provider: Liam Andrea MD      HPI:   Roseanne Marroquin is a 89 y.o. female, with known AV block/ Bradycardia s/p CARDIAC PACEMAKER, DUAL CHAMBER 2/24/21, PAF (paroxysmal atrial fibrillation), HTN, Dyslipidemia,  Bilateral carotid artery stenosis, aneurysmal dilatation of the thoracic aorta, Type II DM, presented to ED yesterday  with chest pain and nausea.   Patient states she had chest pain at home prior to arrival here.   She describes pain as "feels like indigestion" , like " I cant digest my food" " reflux" , symptoms resolve with belching. States " I make myself vomit sometimes"   Has no coronary artery disease history with marked debility  Atrial paced rhythm on EKG with no obvious ST changes noted  The patient is also nauseated which is also largely subsided  TNI 0.061>0.070   Recent Labs   Lab 09/21/22  0417   TROPONINI 0.070*     D- dimer 0.91 ; cardiology recommending CTA       * No surgery found *      Hospital Course:   Pt was admitted with chest pain. CTA shows ascending aorta is dilated measuring to 5.6 cm in AP dimension. Pt known to Dr Nowak with hx of aneurysm. She c/o belching and heart burn.  He has cleared her for d/c. Adding PPI. Also to nite is large thryroid mass. She does report that she feels like food gets stuck. She reports that when she can belch she feels better.        Goals of Care Treatment Preferences:  Code Status: Full Code      Consults:   Consults (From " admission, onward)          Status Ordering Provider     Inpatient consult to [facility]iology[facility]  Once        Provider:  Krishna Nowak MD    Acknowledged ALANNA WALL.            * Chest pain  DDX- epigastric  Cardiology consulted  Continue statin, asa, eliquis low dose   Add PPI       Thyroid mass  Likely causing her food to get stuck. Will f/u outpt ENT      Thoracic aortic aneurysm without rupture  4.5 cm in greatest AP diameter per CTA 9/2021   Lab Results   Component Value Date    DDIMER 0.91 (H) 09/20/2022     Repeat CTA today per cardiology recommendations    Troponin I above reference range  Trending down  Cardiology consulted       Chronic diastolic heart failure    No acute symptoms      Pacemaker  Paced   Hx PAF on eliquis 2.5mg bid       Mixed hyperlipidemia  Atorvastatin 20mg q PM       Gastroesophageal reflux disease without esophagitis  protonix added       Essential hypertension  Home Rx ; amlodipine 10mg daily  cardsura 1mg 0.5md q PM  HCTZ 25mg daily, valsartan 160mg daily   toprol XL 100mg po daily     Type 2 diabetes with nephropathy  Patient's FSGs are controlled on current medication regimen.  Last A1c reviewed-   Lab Results   Component Value Date    HGBA1C 5.8 (H) 09/20/2022     Most recent fingerstick glucose reviewed-   Recent Labs   Lab 09/21/22  1119 09/21/22  1542 09/21/22  2006 09/22/22  0711   POCTGLUCOSE 86 95 84 86     Current correctional scale  Low  Maintain anti-hyperglycemic dose as follows-   Antihyperglycemics (From admission, onward)      Start     Stop Route Frequency Ordered    09/21/22 0715  insulin aspart U-100 pen 6 Units         -- SubQ 3 times daily with meals 09/20/22 2111 09/20/22 2115  insulin detemir U-100 pen 20 Units         -- SubQ Nightly 09/20/22 2111 09/20/22 2111  insulin aspart U-100 pen 0-5 Units         -- SubQ Before meals & nightly PRN 09/20/22 2111          Hold Oral hypoglycemics while patient is in the hospital.      Final Active  Diagnoses:    Diagnosis Date Noted POA    PRINCIPAL PROBLEM:  Chest pain [R07.9] 09/21/2022 Yes    Thyroid mass [E07.9] 09/22/2022 Yes    Troponin I above reference range [R77.8] 09/21/2022 Yes    Thoracic aortic aneurysm without rupture [I71.2] 09/21/2022 Yes    Chronic diastolic heart failure [I50.32] 02/25/2021 Yes    Pacemaker [Z95.0]  Yes    Mixed hyperlipidemia [E78.2] 12/02/2019 Yes    Gastroesophageal reflux disease without esophagitis [K21.9] 07/09/2018 Yes    Essential hypertension [I10] 06/17/2018 Yes    Type 2 diabetes with nephropathy [E11.21] 06/17/2018 Yes      Problems Resolved During this Admission:       Discharged Condition: stable    Disposition: Home or Self Care    Follow Up:   Follow-up Information       Jose Murray MD Follow up.    Specialty: Otolaryngology  Contact information:  [address]  [address]  [place] [address] [place] [address] [place] [id number] [phone]               Krishna Nowak MD Follow up in 1 week(s).    Specialty: Cardiology  Contact information:  [address] [place] [address]  [phone]                           Patient Instructions:   No discharge procedures on file.    Significant Diagnostic Studies:     All pertinent labs within the past 24 hours have been reviewed.  A1C:   Recent Labs   Lab 09/20/22  2216   HGBA1C 5.8*         Bilirubin:   Recent Labs   Lab 09/20/22 1647 09/21/22  0417   BILITOT 1.4* 1.4*         CBC:   Recent Labs   Lab 09/20/22 1647 09/21/22  0417   WBC 4.65 3.80*   HGB 13.2 12.4   HCT 40.5 37.5    194       CMP:   Recent Labs   Lab 09/20/22 1647 09/21/22  0417    141   K 3.8 3.7    105   CO2 27 27   GLU 95 88   BUN 16 14   CREATININE 1.1 1.0   CALCIUM 9.5 9.3   PROT 7.5 6.7   ALBUMIN 3.9 3.5   BILITOT 1.4* 1.4*   ALKPHOS 60 54*   AST 14 15   ALT 11 10   ANIONGAP 11 9       Cardiac Markers:   Recent Labs   Lab 09/20/22  1647   *         POCT Glucose:   Recent Labs   Lab 09/21/22 2006 09/22/22  0711 09/22/22  113    POCTGLUCOSE 84 86 102       Troponin:   Recent Labs   Lab 09/21/22  1603 09/21/22  2151 09/22/22  0405   TROPONINI 0.058* 0.046* 0.053*       TSH:   Recent Labs   Lab 09/21/22  1603   TSH 0.688       Lab Results   Component Value Date    DDIMER 0.91 (H) 09/20/2022         Significant Imaging:     CTA No evidence for pulmonary embolus.     Aneurysmal dilatation of the ascending aorta.  Calcified coronary artery disease.     Mild centrilobular emphysematous disease of bibasilar subsegmental atelectasis.  No consolidation, pleural effusions or pulmonary nodules.     Soft tissue mass posterior to the trachea extending into the superior mediastinum better characterized on prior CT scan of the chest.  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     Hypodensity in the left hepatic lobe, having increased in size as compared to previous CT scan of 2021.  Further evaluation with a dedicated CT or MRI of the abdomen, liver mass protocol, recommended for further characterization on a nonemergent basis.     Bilateral renal cysts.  Questionable hyperdense cyst versus solid lesion at the lateral aspect of the left kidney.  Consider further evaluation with nonemergent renal ultrasound.     Chronic compression fracture of T12.     Cardiomegaly.    CXR No definite acute radiographic abnormality or interval detrimental change as compared to the prior exam on 06/19/2022.  Bilateral chronic interstitial lung changes and right basilar atelectasis versus scarring.  Cardiomegaly.    EKG 9/20 Ventricular-paced rhythm  Abnormal ECG  When compared with ECG of 20-SEP-2022 16:40,  Vent. rate has decreased BY 7 BPM    9/21 EKG Ventricular-paced rhythm  Abnormal ECG  When compared with ECG of 20-SEP-2022 20:41,  No significant change was found       Pending Diagnostic Studies:       None           Medications:  Reconciled Home Medications:      Medication List        START taking these medications      atorvastatin 20 MG  tablet  Commonly known as: LIPITOR  Take 1 tablet (20 mg total) by mouth every evening.     pantoprazole 40 MG tablet  Commonly known as: PROTONIX  Take 1 tablet (40 mg total) by mouth once daily.            CHANGE how you take these medications      metoprolol succinate 200 MG 24 hr tablet  Commonly known as: TOPROL-XL  Take 1 tablet (200 mg total) by mouth once daily.  Start taking on: September 23, 2022  What changed:   medication strength  how much to take            CONTINUE taking these medications      albuterol 90 mcg/actuation inhaler  Commonly known as: PROVENTIL/VENTOLIN HFA  Inhale 1-2 puffs into the lungs every 6 (six) hours as needed for Wheezing. Rescue     amLODIPine 10 MG tablet  Commonly known as: NORVASC  Take 10 mg by mouth once daily.     aspirin 81 MG Chew  Take 81 mg by mouth once daily.     blood sugar diagnostic Strp  To check BG 4 times daily, to use with insurance preferred meter     blood-glucose meter kit  To check BG 2 times daily, to use with insurance preferred meter     ELIQUIS 2.5 mg Tab  Generic drug: apixaban  Take 2.5 mg by mouth 2 (two) times daily.     furosemide 20 MG tablet  Commonly known as: LASIX  Take 20 mg by mouth once daily.     lancets Misc  To check BG 2 times daily, to use with insurance preferred meter     latanoprost 0.005 % ophthalmic solution  Place 1 drop into both eyes nightly.     RESTASIS 0.05 % ophthalmic emulsion  Generic drug: cycloSPORINE            STOP taking these medications      doxazosin 1 MG tablet  Commonly known as: CARDURA     hydroCHLOROthiazide 25 MG tablet  Commonly known as: HYDRODIURIL     metFORMIN 500 MG tablet  Commonly known as: GLUCOPHAGE     omeprazole 20 MG capsule  Commonly known as: PRILOSEC     valsartan 160 MG tablet  Commonly known as: DIOVAN              Indwelling Lines/Drains at time of discharge:   Lines/Drains/Airways       None                   Time spent on the discharge of patient: 20 minutes         Velma Casillas  NP  Department of Hospital Medicine  [facility] (3rd Fl)

## 2022-09-22 NOTE — NURSING
Reviewed criteria to receive flu vaccine. Patient meets criteria to receive. VIS statement given. Patient request flu vaccine prior to discharge.

## 2022-09-22 NOTE — ASSESSMENT & PLAN NOTE
4.5 cm in greatest AP diameter per CTA 9/2021   Lab Results   Component Value Date    DDIMER 0.91 (H) 09/20/2022     Repeat CTA today per cardiology recommendations  Stable

## 2022-09-22 NOTE — SUBJECTIVE & OBJECTIVE
"    Review of Systems   Constitutional:  Negative for chills and fever.   HENT:  Positive for trouble swallowing.    Respiratory:  Positive for shortness of breath.    Cardiovascular:  Positive for chest pain and leg swelling. Negative for palpitations.   Gastrointestinal:  Positive for nausea. Negative for abdominal pain, blood in stool and constipation.        "C/ reflux,    Genitourinary:  Negative for difficulty urinating.   Psychiatric/Behavioral:  Negative for dysphoric mood, sleep disturbance and suicidal ideas. The patient is not nervous/anxious.    Objective:     Vital Signs (Most Recent):  Temp: 96.5 °F (35.8 °C) (09/22/22 1100)  Pulse: 61 (09/22/22 1100)  Resp: 18 (09/22/22 1100)  BP: 134/71 (09/22/22 1100)  SpO2: (!) 94 % (09/22/22 1100)   Vital Signs (24h Range):  Temp:  [96.5 °F (35.8 °C)-98 °F (36.7 °C)] 96.5 °F (35.8 °C)  Pulse:  [59-74] 61  Resp:  [16-20] 18  SpO2:  [94 %-99 %] 94 %  BP: (119-142)/(61-75) 134/71     Weight: 69.2 kg (152 lb 8.9 oz)  Body mass index is 26.19 kg/m².    Physical Exam  Constitutional:       Appearance: She is well-developed.   HENT:      Head: Normocephalic and atraumatic.      Right Ear: External ear normal.      Left Ear: External ear normal.      Nose: Nose normal.   Eyes:      Extraocular Movements: EOM normal.      Pupils: Pupils are equal, round, and reactive to light.   Neck:      Thyroid: No thyromegaly.      Vascular: No JVD.      Trachea: No tracheal deviation.      Comments: +thyroid  Cardiovascular:      Rate and Rhythm: Normal rate.      Heart sounds: Normal heart sounds. No murmur heard.  Pulmonary:      Effort: Pulmonary effort is normal. No respiratory distress.      Breath sounds: Normal breath sounds. No wheezing or rales.   Chest:      Chest wall: No tenderness.   Abdominal:      General: Bowel sounds are normal. There is no distension.      Palpations: Abdomen is soft. There is no mass.      Tenderness: There is no abdominal tenderness. There is no " guarding or rebound.   Musculoskeletal:         General: No tenderness. Normal range of motion.      Cervical back: Normal range of motion and neck supple.      Right lower leg: No edema.      Left lower leg: No edema.      Comments: Trace edema      Lymphadenopathy:      Cervical: No cervical adenopathy.   Skin:     General: Skin is warm and dry.      Coloration: Skin is not pale.      Findings: No erythema or rash.   Neurological:      Mental Status: She is alert and oriented to person, place, and time.      Cranial Nerves: No cranial nerve deficit.      Motor: No abnormal muscle tone.      Coordination: Coordination normal.      Deep Tendon Reflexes: Reflexes are normal and symmetric. Reflexes normal.   Psychiatric:         Behavior: Behavior normal.         Thought Content: Thought content normal.         Judgment: Judgment normal.         CRANIAL NERVES     CN III, IV, VI   Pupils are equal, round, and reactive to light.  Extraocular motions are normal.      Significant Labs: All pertinent labs within the past 24 hours have been reviewed.  A1C:   Recent Labs   Lab 09/20/22  2216   HGBA1C 5.8*         Bilirubin:   Recent Labs   Lab 09/20/22 1647 09/21/22  0417   BILITOT 1.4* 1.4*         CBC:   Recent Labs   Lab 09/20/22 1647 09/21/22  0417   WBC 4.65 3.80*   HGB 13.2 12.4   HCT 40.5 37.5    194       CMP:   Recent Labs   Lab 09/20/22 1647 09/21/22  0417    141   K 3.8 3.7    105   CO2 27 27   GLU 95 88   BUN 16 14   CREATININE 1.1 1.0   CALCIUM 9.5 9.3   PROT 7.5 6.7   ALBUMIN 3.9 3.5   BILITOT 1.4* 1.4*   ALKPHOS 60 54*   AST 14 15   ALT 11 10   ANIONGAP 11 9       Cardiac Markers:   Recent Labs   Lab 09/20/22  1647   *         POCT Glucose:   Recent Labs   Lab 09/21/22  2006 09/22/22  0711 09/22/22  1130   POCTGLUCOSE 84 86 102       Troponin:   Recent Labs   Lab 09/21/22  1603 09/21/22  2151 09/22/22  0405   TROPONINI 0.058* 0.046* 0.053*       TSH:   Recent Labs   Lab  09/21/22  1603   TSH 0.688       Lab Results   Component Value Date    DDIMER 0.91 (H) 09/20/2022         Significant Imaging:     CTA No evidence for pulmonary embolus.     Aneurysmal dilatation of the ascending aorta.  Calcified coronary artery disease.     Mild centrilobular emphysematous disease of bibasilar subsegmental atelectasis.  No consolidation, pleural effusions or pulmonary nodules.     Soft tissue mass posterior to the trachea extending into the superior mediastinum better characterized on prior CT scan of the chest.  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     Hypodensity in the left hepatic lobe, having increased in size as compared to previous CT scan of 2021.  Further evaluation with a dedicated CT or MRI of the abdomen, liver mass protocol, recommended for further characterization on a nonemergent basis.     Bilateral renal cysts.  Questionable hyperdense cyst versus solid lesion at the lateral aspect of the left kidney.  Consider further evaluation with nonemergent renal ultrasound.     Chronic compression fracture of T12.     Cardiomegaly.    CXR No definite acute radiographic abnormality or interval detrimental change as compared to the prior exam on 06/19/2022.  Bilateral chronic interstitial lung changes and right basilar atelectasis versus scarring.  Cardiomegaly.    EKG 9/20 Ventricular-paced rhythm  Abnormal ECG  When compared with ECG of 20-SEP-2022 16:40,  Vent. rate has decreased BY 7 BPM    9/21 EKG Ventricular-paced rhythm  Abnormal ECG  When compared with ECG of 20-SEP-2022 20:41,  No significant change was found

## 2022-09-22 NOTE — NURSING
Patient 97% on 2 L per NC. Oxygen taken off to check RA sat. Will continue to monitor. Patient instructed to call for SOB and/or difficulty breathing.

## 2022-09-22 NOTE — NURSING
Notified MD of blood sugar 84. Clarified determir order. MD ordered to hold insulin at this time.

## 2022-09-22 NOTE — SUBJECTIVE & OBJECTIVE
Past Medical History:   Diagnosis Date    A-fib     Arthritis     Diabetes mellitus     Digestive disorder     ACID REFLUX    GAVIN (dyspnea on exertion)     Encounter for blood transfusion     70 YEARS AGO PRIOR TO CHANGING TO     Gout     Hypertension     On supplemental oxygen by nasal cannula     Shingles     Vertigo        Past Surgical History:   Procedure Laterality Date    A-V CARDIAC PACEMAKER INSERTION N/A 2/24/2021    Procedure: INSERTION, MEDTR[employer]IC CARDIAC PACEMAKER, DUAL CHAMBER;  Surgeon: Gustavo Roca MD;  Location: [facility] CATH;  Service: Cardiology;  Laterality: N/A;    CHOLECYSTECTOMY      CYSTOSCOPY W/ RETROGRADES Bilateral 7/16/2021    Procedure: CYSTOSCOPY, WITH RETROGRADE PYELOGRAM;  Surgeon: Tommy Brewer Jr., MD;  Location: [facility] OR [address] [place] [address];  Service: Urology;  Laterality: Bilateral;  1hr    HERNIA REPAIR      HYSTERECTOMY         Review of patient's allergies indicates:  No Known Allergies    No current facility-administered medications on file prior to encounter.     Current Outpatient Medications on File Prior to Encounter   Medication Sig    amLODIPine (NORVASC) 10 MG tablet Take 10 mg by mouth once daily.    aspirin 81 MG Chew Take 81 mg by mouth once daily.    doxazosin (CARDURA) 1 MG tablet Take 0.5 tablets (0.5 mg total) by mouth every evening.    ELIQUIS 2.5 mg Tab Take 2.5 mg by mouth 2 (two) times daily.    furosemide (LASIX) 20 MG tablet Take 20 mg by mouth once daily.    hydroCHLOROthiazide (HYDRODIURIL) 25 MG tablet Take 25 mg by mouth once daily.    latanoprost 0.005 % ophthalmic solution Place 1 drop into both eyes nightly.    metFORMIN (GLUCOPHAGE) 500 MG tablet Take 1 tablet (500 mg total) by mouth 2 (two) times daily with meals.    metoprolol succinate (TOPROL-XL) 50 MG 24 hr tablet Take 100 mg by mouth once daily.     omeprazole (PRILOSEC) 20 MG capsule Take 20 mg by mouth once daily.    RESTASIS 0.05 % ophthalmic emulsion     valsartan (DIOVAN) 160 MG tablet Take 160 mg  "by mouth every evening.     albuterol (PROVENTIL/VENTOLIN HFA) 90 mcg/actuation inhaler Inhale 1-2 puffs into the lungs every 6 (six) hours as needed for Wheezing. Rescue    atorvastatin (LIPITOR) 20 MG tablet Take 1 tablet (20 mg total) by mouth every evening. (Patient not taking: No sig reported)    blood sugar diagnostic Strp To check BG 4 times daily, to use with insurance preferred meter    blood-glucose meter kit To check BG 2 times daily, to use with insurance preferred meter    lancets Misc To check BG 2 times daily, to use with insurance preferred meter     Family History       Problem Relation (Age of Onset)    Cancer Mother          Tobacco Use    Smoking status: Former     Years: 20.00     Types: Cigarettes    Smokeless tobacco: Never   Substance and Sexual Activity    Alcohol use: Not Currently     Comment: occ    Drug use: No    Sexual activity: Never     Review of Systems   Constitutional:  Negative for chills and fever.   Respiratory:  Positive for shortness of breath.    Cardiovascular:  Positive for chest pain and leg swelling. Negative for palpitations.   Gastrointestinal:  Positive for nausea. Negative for abdominal pain, blood in stool and constipation.        "C/ reflux,    Genitourinary:  Negative for difficulty urinating.   Psychiatric/Behavioral:  Negative for dysphoric mood, sleep disturbance and suicidal ideas. The patient is not nervous/anxious.    Objective:     Vital Signs (Most Recent):  Temp: 97.4 °F (36.3 °C) (09/22/22 0716)  Pulse: (!) 59 (09/22/22 0956)  Resp: 16 (09/22/22 0716)  BP: (!) 142/71 (09/22/22 0716)  SpO2: (!) 94 % (09/22/22 0737)   Vital Signs (24h Range):  Temp:  [96.6 °F (35.9 °C)-98 °F (36.7 °C)] 97.4 °F (36.3 °C)  Pulse:  [59-80] 59  Resp:  [16-20] 16  SpO2:  [94 %-99 %] 94 %  BP: (119-147)/(61-82) 142/71     Weight: 69.2 kg (152 lb 8.9 oz)  Body mass index is 26.19 kg/m².    Physical Exam  Constitutional:       Appearance: She is well-developed.   HENT:      Head: " Normocephalic and atraumatic.      Right Ear: External ear normal.      Left Ear: External ear normal.      Nose: Nose normal.   Eyes:      Extraocular Movements: EOM normal.      Pupils: Pupils are equal, round, and reactive to light.   Neck:      Thyroid: No thyromegaly.      Vascular: No JVD.      Trachea: No tracheal deviation.   Cardiovascular:      Rate and Rhythm: Normal rate.      Heart sounds: Normal heart sounds. No murmur heard.  Pulmonary:      Effort: Pulmonary effort is normal. No respiratory distress.      Breath sounds: Normal breath sounds. No wheezing or rales.   Chest:      Chest wall: No tenderness.   Abdominal:      General: Bowel sounds are normal. There is no distension.      Palpations: Abdomen is soft. There is no mass.      Tenderness: There is no abdominal tenderness. There is no guarding or rebound.   Musculoskeletal:         General: No tenderness. Normal range of motion.      Cervical back: Normal range of motion and neck supple.      Right lower leg: Edema present.      Left lower leg: Edema present.      Comments: Trace edema      Lymphadenopathy:      Cervical: No cervical adenopathy.   Skin:     General: Skin is warm and dry.      Coloration: Skin is not pale.      Findings: No erythema or rash.   Neurological:      Mental Status: She is alert and oriented to person, place, and time.      Cranial Nerves: No cranial nerve deficit.      Motor: No abnormal muscle tone.      Coordination: Coordination normal.      Deep Tendon Reflexes: Reflexes are normal and symmetric. Reflexes normal.   Psychiatric:         Behavior: Behavior normal.         Thought Content: Thought content normal.         Judgment: Judgment normal.         CRANIAL NERVES     CN III, IV, VI   Pupils are equal, round, and reactive to light.  Extraocular motions are normal.      Significant Labs: All pertinent labs within the past 24 hours have been reviewed.  A1C:   Recent Labs   Lab 09/20/22  2216   HGBA1C 5.8*          Bilirubin:   Recent Labs   Lab 09/20/22  1647 09/21/22  0417   BILITOT 1.4* 1.4*         CBC:   Recent Labs   Lab 09/20/22  1647 09/21/22  0417   WBC 4.65 3.80*   HGB 13.2 12.4   HCT 40.5 37.5    194       CMP:   Recent Labs   Lab 09/20/22  1647 09/21/22  0417    141   K 3.8 3.7    105   CO2 27 27   GLU 95 88   BUN 16 14   CREATININE 1.1 1.0   CALCIUM 9.5 9.3   PROT 7.5 6.7   ALBUMIN 3.9 3.5   BILITOT 1.4* 1.4*   ALKPHOS 60 54*   AST 14 15   ALT 11 10   ANIONGAP 11 9       Cardiac Markers:   Recent Labs   Lab 09/20/22  1647   *         POCT Glucose:   Recent Labs   Lab 09/21/22  1542 09/21/22  2006 09/22/22  0711   POCTGLUCOSE 95 84 86       Troponin:   Recent Labs   Lab 09/21/22  1603 09/21/22  2151 09/22/22  0405   TROPONINI 0.058* 0.046* 0.053*       TSH:   Recent Labs   Lab 09/21/22  1603   TSH 0.688     Lab Results   Component Value Date    DDIMER 0.91 (H) 09/20/2022         Significant Imaging:     CTA No evidence for pulmonary embolus.     Aneurysmal dilatation of the ascending aorta.  Calcified coronary artery disease.     Mild centrilobular emphysematous disease of bibasilar subsegmental atelectasis.  No consolidation, pleural effusions or pulmonary nodules.     Soft tissue mass posterior to the trachea extending into the superior mediastinum better characterized on prior CT scan of the chest.  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     Hypodensity in the left hepatic lobe, having increased in size as compared to previous CT scan of 2021.  Further evaluation with a dedicated CT or MRI of the abdomen, liver mass protocol, recommended for further characterization on a nonemergent basis.     Bilateral renal cysts.  Questionable hyperdense cyst versus solid lesion at the lateral aspect of the left kidney.  Consider further evaluation with nonemergent renal ultrasound.     Chronic compression fracture of T12.     Cardiomegaly.    CXR No definite  acute radiographic abnormality or interval detrimental change as compared to the prior exam on 06/19/2022.  Bilateral chronic interstitial lung changes and right basilar atelectasis versus scarring.  Cardiomegaly.    EKG 9/20 Ventricular-paced rhythm  Abnormal ECG  When compared with ECG of 20-SEP-2022 16:40,  Vent. rate has decreased BY 7 BPM    9/21 EKG Ventricular-paced rhythm  Abnormal ECG  When compared with ECG of 20-SEP-2022 20:41,  No significant change was found

## 2022-09-22 NOTE — PROGRESS NOTES
[place] [facility] ([facility])  Cardiology  Progress Note    Patient Name: Roseanne Marroquin  MRN: [id number]  Admission Date: 9/20/2022  Hospital Length of Stay: 0 days  Code Status: Full Code   Attending Physician: Gerson Delcid MD   Primary Care Physician: Liam Andrea MD  Expected Discharge Date:   Principal Problem:Chest pain    Subjective:     Hospital Course: Roseanne Marroquin is a 89 y.o. female presenting to the ED with c/o chest pain.  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    Atrial paced rhythm on EKG with no obvious ST changes noted.  Troponin mildly elevated.    Interval History:  89 yo AAF hx chronic AF on  Eliquis, CMO, HTN, DM 2, PPM, thoracic aneurysm presents to ER with c/o nausea/vomiting and CP. Symptoms have for the most part resolved. Troponin is found to be mildly elevated with no significant rise since admission.  EKG remains with V-paced rhythm. Blood pressure appears stable.  Supplemental oxygen has been weaned to room air with adequate O2 saturations.      ROS  Constitutional: Negative.   HENT: Negative.     Cardiovascular:  Positive for chest pain.   Respiratory:  Negative for shortness of breath.    Endocrine: Negative.    Skin: Negative.    Musculoskeletal: Negative.    Gastrointestinal:  Positive for nausea and vomiting.   Genitourinary: Negative.      Objective:     Vital Signs (Most Recent):  Temp: 97.4 °F (36.3 °C) (09/22/22 0716)  Pulse: 61 (09/22/22 0716)  Resp: 16 (09/22/22 0716)  BP: (!) 142/71 (09/22/22 0716)  SpO2: (!) 94 % (09/22/22 0737)   Vital Signs (24h Range):  Temp:  [96.6 °F (35.9 °C)-98.1 °F (36.7 °C)] 97.4 °F (36.3 °C)  Pulse:  [59-80] 61  Resp:  [16-20] 16  SpO2:  [94 %-100 %] 94 %  BP: (119-166)/(61-82) 142/71     Weight: 69.2 kg (152 lb 8.9 oz)  Body mass index is 26.19 kg/m².    SpO2: (!) 94 %  O2 Device (Oxygen Therapy): room  air      Intake/Output Summary (Last 24 hours) at 9/22/2022 0807  Last data filed at 9/21/2022 1700  Gross per 24 hour   Intake 444 ml   Output 1 ml   Net 443 ml       Lines/Drains/Airways       Peripheral Intravenous Line  Duration                  Peripheral IV - Single Lumen 09/20/22 1815 20 G Posterior;Right Forearm 1 day                    Physical Exam  General appearance: alert, appears stated age and cooperative  Head: Normocephalic, without obvious abnormality, atraumatic  Eyes: conjunctivae/corneas clear. PERRL  Neck: no carotid bruit, no JVD and supple, symmetrical, trachea midline  Cardiovascular: Positive for chest pain  Lungs: clear to auscultation bilaterally, normal respiratory effort  Chest Wall: no tenderness  Heart: regular rate and rhythm, S1, S2 normal, no murmur, click, rub or gallop  Abdomen: soft, non-tender; bowel sounds normal; no masses,  Reports nausea/vomiting  Extremities: Extremities normal, atraumatic, no cyanosis, clubbing, or edema  Pulses: Dorsalis Pedis R: 2+ (normal)/L: 2+ (normal)  Skin: Skin color, texture, turgor normal. No rashes or lesions  Neurologic: Normal mood and affect  Alert and oriented X 3     Significant Labs:   Recent Labs   Lab 09/20/22  1647 09/21/22  0417   WBC 4.65 3.80*   HGB 13.2 12.4   HCT 40.5 37.5    194   , and Troponin         Recent Labs   Lab 09/20/22  1647 09/20/22  2216 09/21/22  0417   TROPONINI 0.047* 0.061* 0.070*       Significant Imaging:  CTA 09/20/22 with no evidence of pulmonary embolism  Assessment and Plan:     Brief HPI: Chest pain/Epigastric pain suggestive of GERD.  Minimal tropnin elevation.  CTA with no evidence of pulmonary edema.  Appropriate medical therapy initiated.      Active Diagnoses:    Diagnosis Date Noted POA    PRINCIPAL PROBLEM:  Chest pain [R07.9] 09/21/2022 Yes    Troponin I above reference range [R77.8] 09/21/2022 Yes    Thoracic aortic aneurysm without rupture [I71.2] 09/21/2022 Yes    Chronic diastolic heart  "failure [I50.32] 02/25/2021 Yes    Pacemaker [Z95.0]  Yes    Mixed hyperlipidemia [E78.2] 12/02/2019 Yes    Gastroesophageal reflux disease without esophagitis [K21.9] 07/09/2018 Yes    Essential hypertension [I10] 06/17/2018 Yes    Type 2 diabetes with nephropathy [E11.21] 06/17/2018 Yes      Problems Resolved During this Admission:       VTE Risk Mitigation (From admission, onward)           Ordered     apixaban tablet 2.5 mg  2 times daily         09/20/22 2111     IP VTE HIGH RISK PATIENT  Once         09/20/22 2111     Place sequential compression device  Until discontinued         09/20/22 2111                  No further Chest pain/more epigastric with "gas" N/V suggestive of GERD  Elevated tropinin--minimal  HTN now controlled--suboptimal with a h/o thoracic aortic aneurysm 4.5 9/21 seems larger 4.1 x 5.9 now 9/22 with no evidence of dissection  PPM/chronic AF on Eliquis  Echo 3/22- EF 35%  MPI 5/22 small apical scar with no ischemia  Home O2/ high DDimer with no PE on CT 2016     Plan:Discussed TAA with patient who does not seem to quite understand / will have to see in clinic with family to discuss further  Cont home ASA/Eliquis/pesksetubc24/lasix.  Toprol XL 200mg poqday, resumed spironolactone 50mg po qday, DC HCTZ  Lisinopril   Added Protonix 40 mg qd  Az Claros RN transcribed for Srinivas Nowak MD  Cardiology  [place] - [facility] S[facility] (3rd Fl)   I have personally interviewed and examined this patient face-to-face, and as the physician. Documented the above plan and rendered all medical decision making for this encounter. I have read and agree with the above documentation unless otherwise noted.    "

## 2022-09-22 NOTE — HOSPITAL COURSE
Pt was admitted with chest pain. CTA shows ascending aorta is dilated measuring to 5.6 cm in AP dimension. Pt known to Dr Nowak with hx of aneurysm. She c/o belching and heart burn.  He has cleared her for d/c. Adding PPI. Also to note is large thryroid/mediastinal mass. She does report that she feels like food gets stuck. She reports that when she can belch she feels better.

## 2022-09-23 NOTE — PROGRESS NOTES
"[place] - [facility] ([facility])  [facility] Medicine  Progress Note    Patient Name: Roseanne Marroquin  MRN: [id number]  Patient Class: OP- Observation   Admission Date: 9/20/2022  Length of Stay: 0 days  Attending Physician: No att. providers found  Primary Care Provider: Liam Andrea MD        Subjective:     Principal Problem:Chest pain        HPI:  Roseanne Marroquin is a 89 y.o. female, with known AV block/ Bradycardia s/p CARDIAC PACEMAKER, DUAL CHAMBER 2/24/21, PAF (paroxysmal atrial fibrillation), HTN, Dyslipidemia,  Bilateral carotid artery stenosis, aneurysmal dilatation of the thoracic aorta, Type II DM, presented to ED yesterday  with chest pain and nausea.   Patient states she had chest pain at home prior to arrival here.   She describes pain as "feels like indigestion" , like " I cant digest my food" " reflux" , symptoms resolve with belching. States " I make myself vomit sometimes"   Has no coronary artery disease history with marked debility  Atrial paced rhythm on EKG with no obvious ST changes noted  The patient is also nauseated which is also largely subsided  TNI 0.061>0.070   Recent Labs   Lab 09/21/22  0417   TROPONINI 0.070*     D- dimer 0.91 ; cardiology recommending CTA       Overview/Hospital Course:  Pt was admitted with chest pain. CTA shows ascending aorta is dilated measuring to 5.6 cm in AP dimension. Pt known to Dr Nowak with hx of aneurysm. She c/o belching and heart burn.  He has cleared her for d/c. Adding PPI. Also to note is large thryroid/mediastinal mass. She does report that she feels like food gets stuck. She reports that when she can belch she feels better.           Review of Systems   Constitutional:  Negative for chills and fever.   HENT:  Positive for trouble swallowing.    Respiratory:  Positive for shortness of breath.    Cardiovascular:  Positive for chest pain and leg swelling. Negative for palpitations.   Gastrointestinal:  Positive for nausea. Negative for abdominal pain, " "blood in stool and constipation.        "C/ reflux,    Genitourinary:  Negative for difficulty urinating.   Psychiatric/Behavioral:  Negative for dysphoric mood, sleep disturbance and suicidal ideas. The patient is not nervous/anxious.    Objective:     Vital Signs (Most Recent):  Temp: 96.5 °F (35.8 °C) (09/22/22 1100)  Pulse: 61 (09/22/22 1100)  Resp: 18 (09/22/22 1100)  BP: 134/71 (09/22/22 1100)  SpO2: (!) 94 % (09/22/22 1100)   Vital Signs (24h Range):  Temp:  [96.5 °F (35.8 °C)-98 °F (36.7 °C)] 96.5 °F (35.8 °C)  Pulse:  [59-74] 61  Resp:  [16-20] 18  SpO2:  [94 %-99 %] 94 %  BP: (119-142)/(61-75) 134/71     Weight: 69.2 kg (152 lb 8.9 oz)  Body mass index is 26.19 kg/m².    Physical Exam  Constitutional:       Appearance: She is well-developed.   HENT:      Head: Normocephalic and atraumatic.      Right Ear: External ear normal.      Left Ear: External ear normal.      Nose: Nose normal.   Eyes:      Extraocular Movements: EOM normal.      Pupils: Pupils are equal, round, and reactive to light.   Neck:      Thyroid: No thyromegaly.      Vascular: No JVD.      Trachea: No tracheal deviation.      Comments: +thyroid  Cardiovascular:      Rate and Rhythm: Normal rate.      Heart sounds: Normal heart sounds. No murmur heard.  Pulmonary:      Effort: Pulmonary effort is normal. No respiratory distress.      Breath sounds: Normal breath sounds. No wheezing or rales.   Chest:      Chest wall: No tenderness.   Abdominal:      General: Bowel sounds are normal. There is no distension.      Palpations: Abdomen is soft. There is no mass.      Tenderness: There is no abdominal tenderness. There is no guarding or rebound.   Musculoskeletal:         General: No tenderness. Normal range of motion.      Cervical back: Normal range of motion and neck supple.      Right lower leg: No edema.      Left lower leg: No edema.      Comments: Trace edema      Lymphadenopathy:      Cervical: No cervical adenopathy.   Skin:     General: " Skin is warm and dry.      Coloration: Skin is not pale.      Findings: No erythema or rash.   Neurological:      Mental Status: She is alert and oriented to person, place, and time.      Cranial Nerves: No cranial nerve deficit.      Motor: No abnormal muscle tone.      Coordination: Coordination normal.      Deep Tendon Reflexes: Reflexes are normal and symmetric. Reflexes normal.   Psychiatric:         Behavior: Behavior normal.         Thought Content: Thought content normal.         Judgment: Judgment normal.         CRANIAL NERVES     CN III, IV, VI   Pupils are equal, round, and reactive to light.  Extraocular motions are normal.      Significant Labs: All pertinent labs within the past 24 hours have been reviewed.  A1C:   Recent Labs   Lab 09/20/22  2216   HGBA1C 5.8*         Bilirubin:   Recent Labs   Lab 09/20/22 1647 09/21/22  0417   BILITOT 1.4* 1.4*         CBC:   Recent Labs   Lab 09/20/22 1647 09/21/22  0417   WBC 4.65 3.80*   HGB 13.2 12.4   HCT 40.5 37.5    194       CMP:   Recent Labs   Lab 09/20/22 1647 09/21/22  0417    141   K 3.8 3.7    105   CO2 27 27   GLU 95 88   BUN 16 14   CREATININE 1.1 1.0   CALCIUM 9.5 9.3   PROT 7.5 6.7   ALBUMIN 3.9 3.5   BILITOT 1.4* 1.4*   ALKPHOS 60 54*   AST 14 15   ALT 11 10   ANIONGAP 11 9       Cardiac Markers:   Recent Labs   Lab 09/20/22  1647   *         POCT Glucose:   Recent Labs   Lab 09/21/22  2006 09/22/22  0711 09/22/22  1130   POCTGLUCOSE 84 86 102       Troponin:   Recent Labs   Lab 09/21/22  1603 09/21/22  2151 09/22/22  0405   TROPONINI 0.058* 0.046* 0.053*       TSH:   Recent Labs   Lab 09/21/22  1603   TSH 0.688       Lab Results   Component Value Date    DDIMER 0.91 (H) 09/20/2022         Significant Imaging:     CTA No evidence for pulmonary embolus.     Aneurysmal dilatation of the ascending aorta.  Calcified coronary artery disease.     Mild centrilobular emphysematous disease of bibasilar subsegmental  atelectasis.  No consolidation, pleural effusions or pulmonary nodules.     Soft tissue mass posterior to the trachea extending into the superior mediastinum better characterized on prior CT scan of the chest.  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     Hypodensity in the left hepatic lobe, having increased in size as compared to previous CT scan of 2021.  Further evaluation with a dedicated CT or MRI of the abdomen, liver mass protocol, recommended for further characterization on a nonemergent basis.     Bilateral renal cysts.  Questionable hyperdense cyst versus solid lesion at the lateral aspect of the left kidney.  Consider further evaluation with nonemergent renal ultrasound.     Chronic compression fracture of T12.     Cardiomegaly.    CXR No definite acute radiographic abnormality or interval detrimental change as compared to the prior exam on 06/19/2022.  Bilateral chronic interstitial lung changes and right basilar atelectasis versus scarring.  Cardiomegaly.    EKG 9/20 Ventricular-paced rhythm  Abnormal ECG  When compared with ECG of 20-SEP-2022 16:40,  Vent. rate has decreased BY 7 BPM    9/21 EKG Ventricular-paced rhythm  Abnormal ECG  When compared with ECG of 20-SEP-2022 20:41,  No significant change was found         Assessment/Plan:      * Chest pain  DDX- epigastric  Cardiology consulted  Continue statin, asa, eliquis low dose   Add PPI       Thyroid mass  Likely causing her food to get stuck and worsening reflux like symptoms. Will f/u outpt ENT  Lab Results   Component Value Date    TSH 0.688 09/21/2022         Thoracic aortic aneurysm without rupture  4.5 cm in greatest AP diameter per CTA 9/2021   Lab Results   Component Value Date    DDIMER 0.91 (H) 09/20/2022     Repeat CTA today per cardiology recommendations  Stable     Troponin I above reference range  Trending down  Cardiology consulted       Chronic diastolic heart failure    No acute  symptoms      Pacemaker  Paced   Hx PAF on eliquis 2.5mg bid       Mixed hyperlipidemia  Atorvastatin 20mg q PM       Gastroesophageal reflux disease without esophagitis  protonix added       Essential hypertension  Home Rx ; amlodipine 10mg daily  cardsura 1mg 0.5md q PM> held while here   HCTZ 25mg daily, valsartan 160mg daily > held while here  toprol XL 100mg po daily > increased to 200   F/u Dr Nowak    Type 2 diabetes with nephropathy  Patient's FSGs are controlled on current medication regimen.  Last A1c reviewed-   Lab Results   Component Value Date    HGBA1C 5.8 (H) 09/20/2022     Most recent fingerstick glucose reviewed-   Recent Labs   Lab 09/21/22  1542 09/21/22  2006 09/22/22  0711 09/22/22  1130   POCTGLUCOSE 95 84 86 102     Current correctional scale  Low  Maintain anti-hyperglycemic dose as follows-   Antihyperglycemics (From admission, onward)    Start     Stop Route Frequency Ordered    09/21/22 0715  insulin aspart U-100 pen 6 Units         -- SubQ 3 times daily with meals 09/20/22 2111 09/20/22 2115  insulin detemir U-100 pen 20 Units         -- SubQ Nightly 09/20/22 2111 09/20/22 2111  insulin aspart U-100 pen 0-5 Units         -- SubQ Before meals & nightly PRN 09/20/22 2111        Hold Oral hypoglycemics while patient is in the hospital.      VTE Risk Mitigation (From admission, onward)         Ordered     IP VTE HIGH RISK PATIENT  Once         09/20/22 2111                Discharge Planning   RAMIREZ: 9/22/2022     Code Status: Prior   Is the patient medically ready for discharge?:     Reason for patient still in hospital (select all that apply): Pending disposition  Discharge Plan A: Home                  Uma Terry MD  Department of Hospital Medicine   [place] - [facility] Surg (3rd Fl)

## 2022-09-30 ENCOUNTER — OUTPATIENT CASE MANAGEMENT (OUTPATIENT)
Dept: ADMINISTRATIVE | Facility: OTHER | Age: 87
End: 2022-09-30
Payer: MEDICARE

## 2022-09-30 NOTE — PROGRESS NOTES
Outpatient Care Management  Plan of Care Follow Up Visit    Patient: Roseanne Marroquin  MRN: [id number]  Date of Service: 09/30/2022  Completed by: Suzy Vieira RN  Referral Date: 06/19/2022    Reason for Visit   Patient presents with    Case Closure     9/30/2022       Brief Summary:   Patient stated she went to the hospital with chest pains 9/20/2022, stated they kept me overnight and discharged me home the next day. Stated she thinks she had trapped gas because she felt so much better. Stated they took her off of home oxygen and she is breathing well, using nebulizer treatments as needed. Patient declines any further needs for this CM and agrees to discharge from OPCM program. Noted patient has company in the background, stated there is always someone there to visit, sounded in very good spirits.       Patient Summary     Involvement of Care:  Do I have permission to speak with other family members about your care?   yes    Patient Reported Labs & Vitals:  1.  Any Patient Reported Labs & Vitals?   no  2.  Patient Reported Blood Pressure:   no  3.  Patient Reported Pulse:   no  4.  Patient Reported Weight (Kg):   no  5.  Patient Reported Blood Glucose (mg/dl):   no    Medical and social history was reviewed with patient and/or caregiver.     Clinical Assessment     Reviewed and provided basic information on available community resources for mental health, transportation, wellness resources, and palliative care programs with patient and/or caregiver.     Complex Care Plan     Care plan was discussed and completed today with input from patient and/or caregiver.    Patient Instructions     Instructions were provided via mailed education material resources and are available for the patient to view on the patient portal.

## 2023-03-24 ENCOUNTER — HOSPITAL ENCOUNTER (OUTPATIENT)
Dept: RADIOLOGY | Facility: HOSPITAL | Age: 88
Discharge: HOME | End: 2023-03-24
Attending: SURGERY
Payer: MEDICARE

## 2023-03-24 DIAGNOSIS — R16.0 LIVER MASS: ICD-10-CM

## 2023-03-24 PROCEDURE — 76705 US ABDOMEN LIMITED_LIVER: ICD-10-PCS | Mod: 26,,, | Performed by: RADIOLOGY

## 2023-03-24 PROCEDURE — 76705 ECHO EXAM OF ABDOMEN: CPT | Mod: TC

## 2023-03-24 PROCEDURE — 76705 ECHO EXAM OF ABDOMEN: CPT | Mod: 26,,, | Performed by: RADIOLOGY

## 2023-06-22 NOTE — PLAN OF CARE
06/18/18 1228   Discharge Assessment   Assessment Type Discharge Planning Assessment   Confirmed/corrected address and phone number on facesheet? Yes   Assessment information obtained from? Patient;Medical Record;Caregiver   Expected Length of Stay (days) 3   Communicated expected length of stay with patient/caregiver yes   Prior to hospitilization cognitive status: Alert/Oriented   Prior to hospitalization functional status: Independent   Current cognitive status: Alert/Oriented   Current Functional Status: Independent   Lives With grandchild(negar)   Able to Return to Prior Arrangements yes   Is patient able to care for self after discharge? Yes   Patient's perception of discharge disposition home or selfcare   Readmission Within The Last 30 Days no previous admission in last 30 days   Patient currently being followed by outpatient case management? No   Patient currently receives any other outside agency services? No   Equipment Currently Used at Home walker, rolling   Do you have any problems affording any of your prescribed medications? No   Is the patient taking medications as prescribed? yes   Does the patient have transportation home? Yes   Transportation Available family or friend will provide   Does the patient receive services at the [facility]? No   Discharge Plan A Home with family   Patient/Family In Agreement With Plan yes   Admitted with symptomatic bradycardia. Lives with grandson and his wife. Independent in her ADLs. Plan is to DC home. No DC needs identified.   ---

## 2024-04-08 ENCOUNTER — LAB VISIT (OUTPATIENT)
Dept: LAB | Facility: HOSPITAL | Age: 89
End: 2024-04-08
Attending: ANESTHESIOLOGY
Payer: MEDICARE

## 2024-04-08 DIAGNOSIS — E11.9 DIABETES MELLITUS WITHOUT COMPLICATION: Primary | ICD-10-CM

## 2024-04-08 LAB
ALBUMIN SERPL BCP-MCNC: 3.5 G/DL (ref 3.5–5.2)
ALP SERPL-CCNC: 74 U/L (ref 55–135)
ALT SERPL W/O P-5'-P-CCNC: 11 U/L (ref 10–44)
ANION GAP SERPL CALC-SCNC: 8 MMOL/L (ref 8–16)
AST SERPL-CCNC: 17 U/L (ref 10–40)
BILIRUB SERPL-MCNC: 1.2 MG/DL (ref 0.1–1)
BUN SERPL-MCNC: 25 MG/DL (ref 10–30)
CALCIUM SERPL-MCNC: 9.2 MG/DL (ref 8.7–10.5)
CHLORIDE SERPL-SCNC: 106 MMOL/L (ref 95–110)
CO2 SERPL-SCNC: 27 MMOL/L (ref 23–29)
CREAT SERPL-MCNC: 1.3 MG/DL (ref 0.5–1.4)
EST. GFR  (NO RACE VARIABLE): 37 ML/MIN/1.73 M^2
GLUCOSE SERPL-MCNC: 81 MG/DL (ref 70–110)
POTASSIUM SERPL-SCNC: 4.5 MMOL/L (ref 3.5–5.1)
PROT SERPL-MCNC: 7.1 G/DL (ref 6–8.4)
SODIUM SERPL-SCNC: 141 MMOL/L (ref 136–145)

## 2024-04-08 PROCEDURE — 80053 COMPREHEN METABOLIC PANEL: CPT | Performed by: ANESTHESIOLOGY

## 2024-04-08 PROCEDURE — 36415 COLL VENOUS BLD VENIPUNCTURE: CPT | Performed by: ANESTHESIOLOGY

## 2024-04-17 ENCOUNTER — LAB VISIT (OUTPATIENT)
Dept: LAB | Facility: HOSPITAL | Age: 89
End: 2024-04-17
Attending: SURGERY
Payer: MEDICARE

## 2024-04-17 DIAGNOSIS — R94.01 NONSPECIFIC ABNORMAL ELECTROENCEPHALOGRAM (EEG): ICD-10-CM

## 2024-04-17 DIAGNOSIS — R94.01 NONSPECIFIC ABNORMAL ELECTROENCEPHALOGRAM (EEG): Primary | ICD-10-CM

## 2024-04-17 LAB
ALBUMIN SERPL BCP-MCNC: 3.4 G/DL (ref 3.5–5.2)
ALP SERPL-CCNC: 80 U/L (ref 55–135)
ALT SERPL W/O P-5'-P-CCNC: 10 U/L (ref 10–44)
ANION GAP SERPL CALC-SCNC: 6 MMOL/L (ref 8–16)
AST SERPL-CCNC: 21 U/L (ref 10–40)
BILIRUB SERPL-MCNC: 0.8 MG/DL (ref 0.1–1)
BUN SERPL-MCNC: 22 MG/DL (ref 10–30)
CALCIUM SERPL-MCNC: 8.7 MG/DL (ref 8.7–10.5)
CHLORIDE SERPL-SCNC: 107 MMOL/L (ref 95–110)
CO2 SERPL-SCNC: 29 MMOL/L (ref 23–29)
CREAT SERPL-MCNC: 1 MG/DL (ref 0.5–1.4)
EST. GFR  (NO RACE VARIABLE): 51 ML/MIN/1.73 M^2
GLUCOSE SERPL-MCNC: 89 MG/DL (ref 70–110)
POTASSIUM SERPL-SCNC: 4 MMOL/L (ref 3.5–5.1)
PROT SERPL-MCNC: 6.8 G/DL (ref 6–8.4)
SODIUM SERPL-SCNC: 142 MMOL/L (ref 136–145)

## 2024-04-17 PROCEDURE — 36415 COLL VENOUS BLD VENIPUNCTURE: CPT | Performed by: SURGERY

## 2024-04-17 PROCEDURE — 80053 COMPREHEN METABOLIC PANEL: CPT | Performed by: SURGERY

## 2024-04-25 ENCOUNTER — LAB VISIT (OUTPATIENT)
Dept: LAB | Facility: HOSPITAL | Age: 89
End: 2024-04-25
Attending: ANESTHESIOLOGY
Payer: MEDICARE

## 2024-04-25 DIAGNOSIS — R94.4 NONSPECIFIC ABNORMAL RESULTS OF KIDNEY FUNCTION STUDY: Primary | ICD-10-CM

## 2024-04-25 LAB
ALBUMIN SERPL BCP-MCNC: 3.8 G/DL (ref 3.5–5.2)
ALP SERPL-CCNC: 80 U/L (ref 55–135)
ALT SERPL W/O P-5'-P-CCNC: 8 U/L (ref 10–44)
ANION GAP SERPL CALC-SCNC: 9 MMOL/L (ref 8–16)
AST SERPL-CCNC: 17 U/L (ref 10–40)
BILIRUB SERPL-MCNC: 1.6 MG/DL (ref 0.1–1)
BUN SERPL-MCNC: 22 MG/DL (ref 10–30)
CALCIUM SERPL-MCNC: 8.7 MG/DL (ref 8.7–10.5)
CHLORIDE SERPL-SCNC: 103 MMOL/L (ref 95–110)
CO2 SERPL-SCNC: 29 MMOL/L (ref 23–29)
CREAT SERPL-MCNC: 1.3 MG/DL (ref 0.5–1.4)
EST. GFR  (NO RACE VARIABLE): 37 ML/MIN/1.73 M^2
GLUCOSE SERPL-MCNC: 87 MG/DL (ref 70–110)
POTASSIUM SERPL-SCNC: 4.1 MMOL/L (ref 3.5–5.1)
PROT SERPL-MCNC: 7.2 G/DL (ref 6–8.4)
SODIUM SERPL-SCNC: 141 MMOL/L (ref 136–145)

## 2024-04-25 PROCEDURE — 36415 COLL VENOUS BLD VENIPUNCTURE: CPT | Performed by: ANESTHESIOLOGY

## 2024-04-25 PROCEDURE — 80053 COMPREHEN METABOLIC PANEL: CPT | Performed by: ANESTHESIOLOGY

## 2024-05-08 ENCOUNTER — LAB VISIT (OUTPATIENT)
Dept: LAB | Facility: HOSPITAL | Age: 89
End: 2024-05-08
Attending: ANESTHESIOLOGY
Payer: MEDICARE

## 2024-05-08 DIAGNOSIS — R93.89 ABNORMAL RADIOLOGICAL FINDINGS IN SKIN AND SUBCUTANEOUS TISSUE: Primary | ICD-10-CM

## 2024-05-08 LAB
ALBUMIN SERPL BCP-MCNC: 3.7 G/DL (ref 3.5–5.2)
ALP SERPL-CCNC: 74 U/L (ref 55–135)
ALT SERPL W/O P-5'-P-CCNC: 9 U/L (ref 10–44)
ANION GAP SERPL CALC-SCNC: 8 MMOL/L (ref 8–16)
AST SERPL-CCNC: 18 U/L (ref 10–40)
BILIRUB SERPL-MCNC: 1.2 MG/DL (ref 0.1–1)
BUN SERPL-MCNC: 20 MG/DL (ref 10–30)
CALCIUM SERPL-MCNC: 9.1 MG/DL (ref 8.7–10.5)
CHLORIDE SERPL-SCNC: 104 MMOL/L (ref 95–110)
CO2 SERPL-SCNC: 28 MMOL/L (ref 23–29)
CREAT SERPL-MCNC: 1.2 MG/DL (ref 0.5–1.4)
EST. GFR  (NO RACE VARIABLE): 41 ML/MIN/1.73 M^2
GLUCOSE SERPL-MCNC: 81 MG/DL (ref 70–110)
POTASSIUM SERPL-SCNC: 5 MMOL/L (ref 3.5–5.1)
PROT SERPL-MCNC: 6.9 G/DL (ref 6–8.4)
SODIUM SERPL-SCNC: 140 MMOL/L (ref 136–145)

## 2024-05-08 PROCEDURE — 80053 COMPREHEN METABOLIC PANEL: CPT | Performed by: ANESTHESIOLOGY

## 2024-05-08 PROCEDURE — 36415 COLL VENOUS BLD VENIPUNCTURE: CPT | Performed by: ANESTHESIOLOGY

## 2024-06-10 ENCOUNTER — LAB VISIT (OUTPATIENT)
Dept: LAB | Facility: HOSPITAL | Age: 89
End: 2024-06-10
Attending: SURGERY
Payer: MEDICARE

## 2024-06-10 DIAGNOSIS — N28.9 ABNORMAL KIDNEY FUNCTION: Primary | ICD-10-CM

## 2024-06-10 LAB
ALBUMIN SERPL BCP-MCNC: 3.5 G/DL (ref 3.5–5.2)
ALP SERPL-CCNC: 69 U/L (ref 55–135)
ALT SERPL W/O P-5'-P-CCNC: 12 U/L (ref 10–44)
ANION GAP SERPL CALC-SCNC: 8 MMOL/L (ref 8–16)
AST SERPL-CCNC: 15 U/L (ref 10–40)
BILIRUB SERPL-MCNC: 1.1 MG/DL (ref 0.1–1)
BUN SERPL-MCNC: 21 MG/DL (ref 10–30)
CALCIUM SERPL-MCNC: 8.8 MG/DL (ref 8.7–10.5)
CHLORIDE SERPL-SCNC: 109 MMOL/L (ref 95–110)
CO2 SERPL-SCNC: 25 MMOL/L (ref 23–29)
CREAT SERPL-MCNC: 1.4 MG/DL (ref 0.5–1.4)
EST. GFR  (NO RACE VARIABLE): 34 ML/MIN/1.73 M^2
GLUCOSE SERPL-MCNC: 90 MG/DL (ref 70–110)
POTASSIUM SERPL-SCNC: 4.4 MMOL/L (ref 3.5–5.1)
PROT SERPL-MCNC: 7 G/DL (ref 6–8.4)
SODIUM SERPL-SCNC: 142 MMOL/L (ref 136–145)

## 2024-06-10 PROCEDURE — 36415 COLL VENOUS BLD VENIPUNCTURE: CPT | Performed by: SURGERY

## 2024-06-10 PROCEDURE — 80053 COMPREHEN METABOLIC PANEL: CPT | Performed by: SURGERY

## 2024-07-24 ENCOUNTER — HOSPITAL ENCOUNTER (OUTPATIENT)
Dept: RADIOLOGY | Facility: HOSPITAL | Age: 89
Discharge: HOME | End: 2024-07-24
Attending: SURGERY
Payer: MEDICARE

## 2024-07-24 DIAGNOSIS — R63.4 ABNORMAL WEIGHT LOSS: ICD-10-CM

## 2024-07-24 DIAGNOSIS — D73.4 SPLENIC CYST: ICD-10-CM

## 2024-07-24 DIAGNOSIS — R16.0 LIVER MASS: ICD-10-CM

## 2024-07-24 DIAGNOSIS — R07.9 CHEST PAIN: ICD-10-CM

## 2024-07-24 DIAGNOSIS — R16.0 LIVER MASS: Primary | ICD-10-CM

## 2024-07-24 PROCEDURE — 74177 CT ABD & PELVIS W/CONTRAST: CPT | Mod: TC

## 2024-07-24 PROCEDURE — 74177 CT ABD & PELVIS W/CONTRAST: CPT | Mod: 26,,, | Performed by: RADIOLOGY

## 2024-07-24 PROCEDURE — 25500020 PHARM REV CODE 255: Performed by: SURGERY

## 2024-07-24 PROCEDURE — 71260 CT THORAX DX C+: CPT | Mod: TC

## 2024-07-24 PROCEDURE — 71260 CT THORAX DX C+: CPT | Mod: 26,,, | Performed by: RADIOLOGY

## 2024-07-24 RX ADMIN — IOHEXOL 30 ML: 350 INJECTION, SOLUTION INTRAVENOUS at 02:07

## 2024-07-24 RX ADMIN — IOHEXOL 75 ML: 350 INJECTION, SOLUTION INTRAVENOUS at 02:07

## 2024-09-09 NOTE — ED PROVIDER NOTES
Dictated.    Successful replacement of dual chamber pacemaker ([employer]). DDDR 60/130.    Minimal blood loss. No apparent complication.      Plan:  -home later this afternoon, if stable  -keep incision dry for three days  -follow-up in device clinic next week     Encounter Date: 4/21/2020       History     Chief Complaint   Patient presents with    Fall     left side head discomfort     Patient is 89-year-old black female who reports falling in her yd about 1 hr prior to arrival.  No reported LOC.  She does complain of headache and some burning across the left parietal scalp.  In the triage area a contusion was noted in this area.  She is ambulatory without difficulty.        Review of patient's allergies indicates:  No Known Allergies  Past Medical History:   Diagnosis Date    Diabetes mellitus     Hypertension     Vertigo      Past Surgical History:   Procedure Laterality Date    CHOLECYSTECTOMY      HERNIA REPAIR       No family history on file.  Social History     Tobacco Use    Smoking status: Never Smoker    Smokeless tobacco: Never Used   Substance Use Topics    Alcohol use: No    Drug use: No     Review of Systems   Constitutional: Negative for fever.   HENT: Negative for congestion, ear pain, rhinorrhea, sore throat and trouble swallowing.    Eyes: Negative for pain.   Respiratory: Negative for cough, shortness of breath and wheezing.    Cardiovascular: Negative for chest pain, palpitations and leg swelling.   Gastrointestinal: Negative for abdominal pain, constipation, diarrhea and nausea.   Genitourinary: Negative for difficulty urinating, dysuria, flank pain, frequency, hematuria and urgency.   Musculoskeletal: Negative for arthralgias, back pain, myalgias and neck pain.   Skin: Positive for wound. Negative for rash.        Left Parietal CONTUSION   Neurological: Negative for speech difficulty, weakness and headaches.   Hematological: Does not bruise/bleed easily.       Physical Exam     Initial Vitals [04/21/20 1054]   BP Pulse Resp Temp SpO2   (!) 186/87 66 18 99 °F (37.2 °C) 97 %      MAP       --         Physical Exam    Constitutional: She appears well-developed and well-nourished. No distress.   HENT:   Head: Normocephalic and atraumatic.   Nose:  Nose normal.   Mouth/Throat: Oropharynx is clear and moist.   Eyes: Conjunctivae and EOM are normal. Pupils are equal, round, and reactive to light.   Neck: Normal range of motion. Neck supple.   Cardiovascular: Normal rate, regular rhythm, normal heart sounds and intact distal pulses.   Pulmonary/Chest: Breath sounds normal. No respiratory distress.   Abdominal: Soft. Bowel sounds are normal. She exhibits no distension. There is no tenderness.   Musculoskeletal: Normal range of motion.   Neurological: She is alert and oriented to person, place, and time. She has normal strength.   Skin: Skin is warm and dry.   2 CM X 3 CM HEMATOMA LEFT PARIETAL SCALP   Psychiatric: She has a normal mood and affect. Thought content normal.         ED Course   Procedures  Labs Reviewed - No data to display       Imaging Results    None                                          Clinical Impression:       ICD-10-CM ICD-9-CM   1. Contusion of scalp, initial encounter S00.03XA 920         Disposition:   Disposition: Discharged  Condition: Stable                        Panda Berumen Jr., MD  04/21/20 0975

## 2024-09-17 ENCOUNTER — HOSPITAL ENCOUNTER (EMERGENCY)
Facility: HOSPITAL | Age: 89
Discharge: HOME | End: 2024-09-17
Attending: STUDENT IN AN ORGANIZED HEALTH CARE EDUCATION/TRAINING PROGRAM
Payer: MEDICARE

## 2024-09-17 VITALS
BODY MASS INDEX: 24.77 KG/M2 | RESPIRATION RATE: 20 BRPM | DIASTOLIC BLOOD PRESSURE: 68 MMHG | HEART RATE: 74 BPM | SYSTOLIC BLOOD PRESSURE: 139 MMHG | WEIGHT: 145.06 LBS | OXYGEN SATURATION: 97 % | HEIGHT: 64 IN | TEMPERATURE: 98 F
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
## 2024-09-17 DIAGNOSIS — R93.89 ABNORMAL FINDING ON CT SCAN: Primary | ICD-10-CM

## 2024-09-17 PROCEDURE — 99285 EMERGENCY DEPT VISIT HI MDM: CPT | Mod: 25

## 2024-09-17 PROCEDURE — 25000003 PHARM REV CODE 250: Performed by: STUDENT IN AN ORGANIZED HEALTH CARE EDUCATION/TRAINING PROGRAM

## 2024-09-17 RX ORDER — ACETAMINOPHEN 325 MG/1
650 TABLET ORAL
Status: COMPLETED | OUTPATIENT
Start: 2024-09-17 | End: 2024-09-17

## 2024-09-17 RX ADMIN — ACETAMINOPHEN 650 MG: 325 TABLET ORAL at 12:09
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
## 2024-10-07 PROBLEM — D63.1 ANEMIA DUE TO CHRONIC KIDNEY DISEASE: Status: ACTIVE | Noted: 2024-10-07

## 2024-10-07 PROBLEM — N18.9 ANEMIA DUE TO CHRONIC KIDNEY DISEASE: Status: ACTIVE | Noted: 2024-10-07

## 2025-03-24 ENCOUNTER — LAB VISIT (OUTPATIENT)
Dept: LAB | Facility: HOSPITAL | Age: OVER 89
End: 2025-03-24
Attending: INTERNAL MEDICINE
Payer: MEDICARE

## 2025-03-24 DIAGNOSIS — I65.23 BILATERAL CAROTID ARTERY OCCLUSION: ICD-10-CM

## 2025-03-24 DIAGNOSIS — I65.29 CAROTID ARTERY STENOSIS: ICD-10-CM

## 2025-03-24 DIAGNOSIS — I71.21 ASCENDING AORTIC ANEURYSM: Primary | ICD-10-CM

## 2025-03-24 LAB
ANION GAP (OHS): 9 MMOL/L (ref 8–16)
BUN SERPL-MCNC: 23 MG/DL (ref 10–30)
CALCIUM SERPL-MCNC: 8.5 MG/DL (ref 8.7–10.5)
CHLORIDE SERPL-SCNC: 106 MMOL/L (ref 95–110)
CO2 SERPL-SCNC: 27 MMOL/L (ref 23–29)
CREAT SERPL-MCNC: 1.3 MG/DL (ref 0.5–1.4)
GFR SERPLBLD CREATININE-BSD FMLA CKD-EPI: 37 ML/MIN/1.73/M2
GLUCOSE SERPL-MCNC: 97 MG/DL (ref 70–110)
POTASSIUM SERPL-SCNC: 4.2 MMOL/L (ref 3.5–5.1)
SODIUM SERPL-SCNC: 142 MMOL/L (ref 136–145)

## 2025-03-24 PROCEDURE — 82374 ASSAY BLOOD CARBON DIOXIDE: CPT

## 2025-03-24 PROCEDURE — 36415 COLL VENOUS BLD VENIPUNCTURE: CPT

## (undated) DEVICE — TRAY CYSTO BASIN

## (undated) DEVICE — UNDERGLOVES BIOGEL PI SZ 7 LF

## (undated) DEVICE — SYR 10CC LUER LOCK

## (undated) DEVICE — CATH 5FR OPEN END URETERAL

## (undated) DEVICE — GOWN SMARTGOWN LVL4 X-LONG XL

## (undated) DEVICE — ADAPTER HOSE 10FT 8MM

## (undated) DEVICE — UNDERGLOVE BIOGEL PI SZ 6.5 LF

## (undated) DEVICE — PACK CYSTO

## (undated) DEVICE — SOL IRR NACL .9% 3000ML